# Patient Record
Sex: MALE | Race: WHITE | Employment: OTHER | URBAN - NONMETROPOLITAN AREA
[De-identification: names, ages, dates, MRNs, and addresses within clinical notes are randomized per-mention and may not be internally consistent; named-entity substitution may affect disease eponyms.]

---

## 2017-03-03 ENCOUNTER — TELEPHONE (OUTPATIENT)
Dept: FAMILY MEDICINE CLINIC | Age: 62
End: 2017-03-03

## 2017-03-03 DIAGNOSIS — Z00.00 ROUTINE GENERAL MEDICAL EXAMINATION AT A HEALTH CARE FACILITY: Primary | ICD-10-CM

## 2017-04-04 ENCOUNTER — OFFICE VISIT (OUTPATIENT)
Dept: FAMILY MEDICINE CLINIC | Age: 62
End: 2017-04-04

## 2017-04-04 VITALS
RESPIRATION RATE: 14 BRPM | DIASTOLIC BLOOD PRESSURE: 70 MMHG | BODY MASS INDEX: 36.67 KG/M2 | WEIGHT: 228.2 LBS | HEIGHT: 66 IN | HEART RATE: 72 BPM | SYSTOLIC BLOOD PRESSURE: 128 MMHG

## 2017-04-04 DIAGNOSIS — M15.9 PRIMARY OSTEOARTHRITIS INVOLVING MULTIPLE JOINTS: ICD-10-CM

## 2017-04-04 DIAGNOSIS — G44.019 EPISODIC CLUSTER HEADACHE, NOT INTRACTABLE: ICD-10-CM

## 2017-04-04 DIAGNOSIS — K21.9 GASTROESOPHAGEAL REFLUX DISEASE WITHOUT ESOPHAGITIS: ICD-10-CM

## 2017-04-04 DIAGNOSIS — E78.00 PURE HYPERCHOLESTEROLEMIA: ICD-10-CM

## 2017-04-04 DIAGNOSIS — I10 ESSENTIAL HYPERTENSION: Primary | ICD-10-CM

## 2017-04-04 PROCEDURE — 99396 PREV VISIT EST AGE 40-64: CPT | Performed by: FAMILY MEDICINE

## 2017-04-04 RX ORDER — FEXOFENADINE HCL 180 MG/1
180 TABLET ORAL DAILY
Qty: 90 TABLET | Refills: 3 | Status: SHIPPED | OUTPATIENT
Start: 2017-04-04 | End: 2018-04-06 | Stop reason: SDUPTHER

## 2017-04-04 RX ORDER — LANSOPRAZOLE 30 MG/1
30 CAPSULE, DELAYED RELEASE ORAL DAILY
Qty: 90 CAPSULE | Refills: 3 | Status: SHIPPED | OUTPATIENT
Start: 2017-04-04 | End: 2018-03-16 | Stop reason: SDUPTHER

## 2017-04-04 RX ORDER — FLUTICASONE PROPIONATE 50 MCG
1 SPRAY, SUSPENSION (ML) NASAL DAILY
Qty: 3 BOTTLE | Refills: 3 | Status: SHIPPED | OUTPATIENT
Start: 2017-04-04 | End: 2018-04-06 | Stop reason: SDUPTHER

## 2017-04-04 RX ORDER — ATORVASTATIN CALCIUM 40 MG/1
40 TABLET, FILM COATED ORAL DAILY
Qty: 90 TABLET | Refills: 3 | Status: SHIPPED | OUTPATIENT
Start: 2017-04-04 | End: 2018-04-06 | Stop reason: SDUPTHER

## 2017-08-09 DIAGNOSIS — I10 ESSENTIAL HYPERTENSION: ICD-10-CM

## 2017-08-09 RX ORDER — LOSARTAN POTASSIUM 50 MG/1
TABLET ORAL
Qty: 90 TABLET | Refills: 3 | Status: SHIPPED | OUTPATIENT
Start: 2017-08-09 | End: 2018-08-07 | Stop reason: SDUPTHER

## 2017-10-06 ENCOUNTER — NURSE ONLY (OUTPATIENT)
Dept: FAMILY MEDICINE CLINIC | Age: 62
End: 2017-10-06
Payer: COMMERCIAL

## 2017-10-06 VITALS — DIASTOLIC BLOOD PRESSURE: 86 MMHG | SYSTOLIC BLOOD PRESSURE: 120 MMHG | HEART RATE: 68 BPM | RESPIRATION RATE: 16 BRPM

## 2017-10-06 DIAGNOSIS — Z23 NEED FOR PNEUMOCOCCAL VACCINATION: Primary | ICD-10-CM

## 2017-10-06 PROCEDURE — 90471 IMMUNIZATION ADMIN: CPT | Performed by: FAMILY MEDICINE

## 2017-10-06 PROCEDURE — 90670 PCV13 VACCINE IM: CPT | Performed by: FAMILY MEDICINE

## 2017-10-06 NOTE — PROGRESS NOTES
Chief Complaint   Patient presents with    Blood Pressure Check    Injections       Vitals:    10/06/17 0849   BP: 120/86   Pulse: 68   Resp:        No changes to med  Call patient

## 2017-10-06 NOTE — PROGRESS NOTES
After obtaining consent, and per orders of Dr. Mark Marmolejo, injection of [id number] 0.5mL IM given in Left deltoid by Tania Farr. Patient instructed to report any adverse reaction to me immediately. VIS given. Consent signed. ABN signed. Patient here for nurse visit BP check;   BP: 136/90 P: 72  After 5 minutes of sitting;  BP: 120/86   P: 68  Patient denies any signs or symptoms of high blood pressure. Patient is asymptomatic.

## 2017-10-20 ENCOUNTER — NURSE ONLY (OUTPATIENT)
Dept: FAMILY MEDICINE CLINIC | Age: 62
End: 2017-10-20
Payer: COMMERCIAL

## 2017-10-20 DIAGNOSIS — Z23 NEED FOR INFLUENZA VACCINATION: Primary | ICD-10-CM

## 2017-10-20 PROCEDURE — 90471 IMMUNIZATION ADMIN: CPT | Performed by: FAMILY MEDICINE

## 2017-10-20 PROCEDURE — 90686 IIV4 VACC NO PRSV 0.5 ML IM: CPT | Performed by: FAMILY MEDICINE

## 2017-10-20 NOTE — PROGRESS NOTES
After obtaining consent, and per orders of Dr. Tomás Sutton, injection of Fluzone 0.5mL IM given in Left deltoid by Joanna Harris. Patient instructed to report any adverse reaction to me immediately. VIS given. Consent signed. Patient tolerated well.

## 2017-12-18 DIAGNOSIS — I10 ESSENTIAL HYPERTENSION: ICD-10-CM

## 2017-12-19 RX ORDER — HYDROCHLOROTHIAZIDE 25 MG/1
25 TABLET ORAL DAILY
Qty: 90 TABLET | Refills: 3 | Status: SHIPPED | OUTPATIENT
Start: 2017-12-19 | End: 2019-01-04 | Stop reason: SDUPTHER

## 2018-03-16 DIAGNOSIS — K21.9 GASTROESOPHAGEAL REFLUX DISEASE WITHOUT ESOPHAGITIS: ICD-10-CM

## 2018-03-16 RX ORDER — LANSOPRAZOLE 30 MG/1
30 CAPSULE, DELAYED RELEASE ORAL DAILY
Qty: 90 CAPSULE | Refills: 3 | Status: SHIPPED | OUTPATIENT
Start: 2018-03-16 | End: 2019-11-04

## 2018-03-16 NOTE — TELEPHONE ENCOUNTER
Lisa Chung called requesting a refill on the following medications:  Requested Prescriptions     Pending Prescriptions Disp Refills    lansoprazole (PREVACID) 30 MG delayed release capsule 90 capsule 3     Sig: Take 1 capsule by mouth daily   pt is running out early due to Dr Nelia Huerta told him to take 1 tab bid instead of Qd when has a flare up of GERD    Pharmacy verified:  [email or web address]        Date of last visit: 04-07-17   Date of next visit (if applicable): 1/3/7836

## 2018-03-23 ENCOUNTER — TELEPHONE (OUTPATIENT)
Dept: FAMILY MEDICINE CLINIC | Age: 63
End: 2018-03-23

## 2018-03-23 DIAGNOSIS — Z00.00 ROUTINE GENERAL MEDICAL EXAMINATION AT A HEALTH CARE FACILITY: Primary | ICD-10-CM

## 2018-03-29 ENCOUNTER — HOSPITAL ENCOUNTER (OUTPATIENT)
Age: 63
Discharge: HOME | End: 2018-03-29
Payer: COMMERCIAL

## 2018-03-29 DIAGNOSIS — Z00.00 ROUTINE GENERAL MEDICAL EXAMINATION AT A HEALTH CARE FACILITY: ICD-10-CM

## 2018-03-29 LAB
ALBUMIN SERPL-MCNC: 4.2 G/DL (ref 3.5–5.1)
ALP BLD-CCNC: 68 U/L (ref 38–126)
ALT SERPL-CCNC: 25 U/L (ref 11–66)
ANION GAP SERPL CALCULATED.3IONS-SCNC: 13 MEQ/L (ref 8–16)
AST SERPL-CCNC: 22 U/L (ref 5–40)
BILIRUB SERPL-MCNC: 0.6 MG/DL (ref 0.3–1.2)
BUN BLDV-MCNC: 16 MG/DL (ref 7–22)
CALCIUM SERPL-MCNC: 9.4 MG/DL (ref 8.5–10.5)
CHLORIDE BLD-SCNC: 103 MEQ/L (ref 98–111)
CHOLESTEROL, TOTAL: 143 MG/DL (ref 100–199)
CO2: 28 MEQ/L (ref 23–33)
CREAT SERPL-MCNC: 0.8 MG/DL (ref 0.4–1.2)
GFR SERPL CREATININE-BSD FRML MDRD: > 90 ML/MIN/1.73M2
GLUCOSE BLD-MCNC: 100 MG/DL (ref 70–108)
HCT VFR BLD CALC: 40.4 % (ref 42–52)
HDLC SERPL-MCNC: 47 MG/DL
HEMOGLOBIN: 14.1 GM/DL (ref 14–18)
LDL CHOLESTEROL CALCULATED: 84 MG/DL
MCH RBC QN AUTO: 31.5 PG (ref 27–31)
MCHC RBC AUTO-ENTMCNC: 34.9 GM/DL (ref 33–37)
MCV RBC AUTO: 90.5 FL (ref 80–94)
PDW BLD-RTO: 12.3 % (ref 11.5–14.5)
PLATELET # BLD: 252 THOU/MM3 (ref 130–400)
PMV BLD AUTO: 7.3 FL (ref 7.4–10.4)
POTASSIUM SERPL-SCNC: 4.3 MEQ/L (ref 3.5–5.2)
PROSTATE SPECIFIC ANTIGEN: 0.48 NG/ML (ref 0–1)
RBC # BLD: 4.46 MILL/MM3 (ref 4.7–6.1)
SODIUM BLD-SCNC: 144 MEQ/L (ref 135–145)
TOTAL PROTEIN: 6.7 G/DL (ref 6.1–8)
TRIGL SERPL-MCNC: 62 MG/DL (ref 0–199)
TSH SERPL DL<=0.05 MIU/L-ACNC: 1.93 UIU/ML (ref 0.4–4.2)
WBC # BLD: 4.9 THOU/MM3 (ref 4.8–10.8)

## 2018-03-29 PROCEDURE — 85027 COMPLETE CBC AUTOMATED: CPT

## 2018-03-29 PROCEDURE — 80053 COMPREHEN METABOLIC PANEL: CPT

## 2018-03-29 PROCEDURE — 84153 ASSAY OF PSA TOTAL: CPT

## 2018-03-29 PROCEDURE — 36415 COLL VENOUS BLD VENIPUNCTURE: CPT

## 2018-03-29 PROCEDURE — 80061 LIPID PANEL: CPT

## 2018-03-29 PROCEDURE — 84443 ASSAY THYROID STIM HORMONE: CPT

## 2018-04-06 ENCOUNTER — OFFICE VISIT (OUTPATIENT)
Dept: FAMILY MEDICINE CLINIC | Age: 63
End: 2018-04-06
Payer: COMMERCIAL

## 2018-04-06 VITALS
OXYGEN SATURATION: 97 % | HEIGHT: 66 IN | TEMPERATURE: 98.4 F | SYSTOLIC BLOOD PRESSURE: 122 MMHG | RESPIRATION RATE: 12 BRPM | HEART RATE: 84 BPM | DIASTOLIC BLOOD PRESSURE: 84 MMHG | WEIGHT: 224.2 LBS | BODY MASS INDEX: 36.03 KG/M2

## 2018-04-06 DIAGNOSIS — E78.00 PURE HYPERCHOLESTEROLEMIA: ICD-10-CM

## 2018-04-06 DIAGNOSIS — G44.019 EPISODIC CLUSTER HEADACHE, NOT INTRACTABLE: ICD-10-CM

## 2018-04-06 DIAGNOSIS — M15.9 PRIMARY OSTEOARTHRITIS INVOLVING MULTIPLE JOINTS: ICD-10-CM

## 2018-04-06 DIAGNOSIS — I10 ESSENTIAL HYPERTENSION: ICD-10-CM

## 2018-04-06 DIAGNOSIS — K21.9 GASTROESOPHAGEAL REFLUX DISEASE WITHOUT ESOPHAGITIS: ICD-10-CM

## 2018-04-06 DIAGNOSIS — Z00.00 ROUTINE GENERAL MEDICAL EXAMINATION AT A HEALTH CARE FACILITY: Primary | ICD-10-CM

## 2018-04-06 DIAGNOSIS — J30.1 CHRONIC SEASONAL ALLERGIC RHINITIS DUE TO POLLEN: ICD-10-CM

## 2018-04-06 PROCEDURE — 99396 PREV VISIT EST AGE 40-64: CPT | Performed by: FAMILY MEDICINE

## 2018-04-06 RX ORDER — FLUTICASONE PROPIONATE 50 MCG
1 SPRAY, SUSPENSION (ML) NASAL DAILY
Qty: 3 BOTTLE | Refills: 3 | Status: SHIPPED | OUTPATIENT
Start: 2018-04-06 | End: 2019-04-12 | Stop reason: SDUPTHER

## 2018-04-06 RX ORDER — CELECOXIB 200 MG/1
200 CAPSULE ORAL DAILY
Qty: 90 CAPSULE | Refills: 3 | Status: SHIPPED | OUTPATIENT
Start: 2018-04-06 | End: 2020-05-26 | Stop reason: ALTCHOICE

## 2018-04-06 RX ORDER — FEXOFENADINE HCL 180 MG/1
180 TABLET ORAL DAILY
Qty: 90 TABLET | Refills: 3 | Status: SHIPPED | OUTPATIENT
Start: 2018-04-06 | End: 2019-04-12 | Stop reason: SDUPTHER

## 2018-04-06 RX ORDER — ATORVASTATIN CALCIUM 40 MG/1
40 TABLET, FILM COATED ORAL DAILY
Qty: 90 TABLET | Refills: 3 | Status: SHIPPED | OUTPATIENT
Start: 2018-04-06 | End: 2019-04-12 | Stop reason: SDUPTHER

## 2018-04-06 ASSESSMENT — PATIENT HEALTH QUESTIONNAIRE - PHQ9
1. LITTLE INTEREST OR PLEASURE IN DOING THINGS: 0
SUM OF ALL RESPONSES TO PHQ QUESTIONS 1-9: 0
SUM OF ALL RESPONSES TO PHQ9 QUESTIONS 1 & 2: 0
2. FEELING DOWN, DEPRESSED OR HOPELESS: 0

## 2018-08-07 DIAGNOSIS — I10 ESSENTIAL HYPERTENSION: ICD-10-CM

## 2018-08-08 RX ORDER — LOSARTAN POTASSIUM 50 MG/1
TABLET ORAL
Qty: 90 TABLET | Refills: 3 | Status: SHIPPED | OUTPATIENT
Start: 2018-08-08 | End: 2019-04-12 | Stop reason: SDUPTHER

## 2018-10-05 ENCOUNTER — NURSE ONLY (OUTPATIENT)
Dept: FAMILY MEDICINE CLINIC | Age: 63
End: 2018-10-05
Payer: COMMERCIAL

## 2018-10-05 VITALS — SYSTOLIC BLOOD PRESSURE: 118 MMHG | HEART RATE: 80 BPM | DIASTOLIC BLOOD PRESSURE: 84 MMHG

## 2018-10-05 DIAGNOSIS — Z23 NEED FOR INFLUENZA VACCINATION: Primary | ICD-10-CM

## 2018-10-05 PROCEDURE — 90471 IMMUNIZATION ADMIN: CPT | Performed by: FAMILY MEDICINE

## 2018-10-05 PROCEDURE — 90686 IIV4 VACC NO PRSV 0.5 ML IM: CPT | Performed by: FAMILY MEDICINE

## 2018-10-05 NOTE — PROGRESS NOTES
Chief Complaint   Patient presents with    Blood Pressure Check    Injections       Vitals:    10/05/18 0831   BP: 118/84   Pulse: 80     No changes to med  Call patient

## 2019-01-04 DIAGNOSIS — I10 ESSENTIAL HYPERTENSION: ICD-10-CM

## 2019-01-04 RX ORDER — HYDROCHLOROTHIAZIDE 25 MG/1
25 TABLET ORAL DAILY
Qty: 90 TABLET | Refills: 3 | Status: SHIPPED | OUTPATIENT
Start: 2019-01-04 | End: 2019-11-04 | Stop reason: SDUPTHER

## 2019-02-12 ENCOUNTER — OFFICE VISIT (OUTPATIENT)
Dept: FAMILY MEDICINE CLINIC | Age: 64
End: 2019-02-12
Payer: COMMERCIAL

## 2019-02-12 VITALS
BODY MASS INDEX: 36.48 KG/M2 | TEMPERATURE: 98.3 F | RESPIRATION RATE: 16 BRPM | HEART RATE: 77 BPM | SYSTOLIC BLOOD PRESSURE: 124 MMHG | DIASTOLIC BLOOD PRESSURE: 80 MMHG | WEIGHT: 227 LBS

## 2019-02-12 DIAGNOSIS — E78.00 PURE HYPERCHOLESTEROLEMIA: ICD-10-CM

## 2019-02-12 DIAGNOSIS — Z12.5 SCREENING FOR PROSTATE CANCER: ICD-10-CM

## 2019-02-12 DIAGNOSIS — J20.9 ACUTE BRONCHITIS, UNSPECIFIED ORGANISM: Primary | ICD-10-CM

## 2019-02-12 DIAGNOSIS — I10 ESSENTIAL HYPERTENSION: ICD-10-CM

## 2019-02-12 PROCEDURE — 99213 OFFICE O/P EST LOW 20 MIN: CPT | Performed by: NURSE PRACTITIONER

## 2019-02-12 RX ORDER — DEXTROMETHORPHAN HYDROBROMIDE AND PROMETHAZINE HYDROCHLORIDE 15; 6.25 MG/5ML; MG/5ML
5 SYRUP ORAL 4 TIMES DAILY PRN
Qty: 240 ML | Refills: 0 | Status: SHIPPED | OUTPATIENT
Start: 2019-02-12 | End: 2019-02-19

## 2019-02-12 RX ORDER — RANITIDINE 300 MG/1
150 CAPSULE ORAL 2 TIMES DAILY
COMMUNITY
End: 2020-05-26 | Stop reason: ALTCHOICE

## 2019-02-12 RX ORDER — AZITHROMYCIN 250 MG/1
TABLET, FILM COATED ORAL
Qty: 1 PACKET | Refills: 0 | Status: SHIPPED | OUTPATIENT
Start: 2019-02-12 | End: 2019-04-12 | Stop reason: ALTCHOICE

## 2019-02-12 ASSESSMENT — ENCOUNTER SYMPTOMS
GASTROINTESTINAL NEGATIVE: 1
EYES NEGATIVE: 1
COUGH: 1

## 2019-02-16 ENCOUNTER — HOSPITAL ENCOUNTER (OUTPATIENT)
Age: 64
Discharge: HOME | End: 2019-02-16
Payer: COMMERCIAL

## 2019-02-16 DIAGNOSIS — Z12.5 SCREENING FOR PROSTATE CANCER: ICD-10-CM

## 2019-02-16 DIAGNOSIS — E78.00 PURE HYPERCHOLESTEROLEMIA: ICD-10-CM

## 2019-02-16 DIAGNOSIS — I10 ESSENTIAL HYPERTENSION: ICD-10-CM

## 2019-02-16 LAB
ALBUMIN SERPL-MCNC: 4.7 G/DL (ref 3.5–5.1)
ALP BLD-CCNC: 72 U/L (ref 38–126)
ALT SERPL-CCNC: 28 U/L (ref 11–66)
ANION GAP SERPL CALCULATED.3IONS-SCNC: 12 MEQ/L (ref 8–16)
AST SERPL-CCNC: 23 U/L (ref 5–40)
BASOPHILS # BLD: 0.8 %
BASOPHILS ABSOLUTE: 0 THOU/MM3 (ref 0–0.1)
BILIRUB SERPL-MCNC: 0.5 MG/DL (ref 0.3–1.2)
BUN BLDV-MCNC: 18 MG/DL (ref 7–22)
CALCIUM SERPL-MCNC: 9.7 MG/DL (ref 8.5–10.5)
CHLORIDE BLD-SCNC: 102 MEQ/L (ref 98–111)
CHOLESTEROL, TOTAL: 159 MG/DL (ref 100–199)
CO2: 29 MEQ/L (ref 23–33)
CREAT SERPL-MCNC: 0.9 MG/DL (ref 0.4–1.2)
EOSINOPHIL # BLD: 6.7 %
EOSINOPHILS ABSOLUTE: 0.3 THOU/MM3 (ref 0–0.4)
ERYTHROCYTE [DISTWIDTH] IN BLOOD BY AUTOMATED COUNT: 11.8 % (ref 11.5–14.5)
ERYTHROCYTE [DISTWIDTH] IN BLOOD BY AUTOMATED COUNT: 39.9 FL (ref 35–45)
GFR SERPL CREATININE-BSD FRML MDRD: 85 ML/MIN/1.73M2
GLUCOSE BLD-MCNC: 104 MG/DL (ref 70–108)
HCT VFR BLD CALC: 43.9 % (ref 42–52)
HDLC SERPL-MCNC: 46 MG/DL
HEMOGLOBIN: 14.7 GM/DL (ref 14–18)
IMMATURE GRANS (ABS): 0.01 THOU/MM3 (ref 0–0.07)
IMMATURE GRANULOCYTES: 0.2 %
LDL CHOLESTEROL CALCULATED: 100 MG/DL
LYMPHOCYTES # BLD: 27.2 %
LYMPHOCYTES ABSOLUTE: 1.3 THOU/MM3 (ref 1–4.8)
MCH RBC QN AUTO: 31.3 PG (ref 26–33)
MCHC RBC AUTO-ENTMCNC: 33.5 GM/DL (ref 32.2–35.5)
MCV RBC AUTO: 93.6 FL (ref 80–94)
MONOCYTES # BLD: 11.2 %
MONOCYTES ABSOLUTE: 0.5 THOU/MM3 (ref 0.4–1.3)
NUCLEATED RED BLOOD CELLS: 0 /100 WBC
PLATELET # BLD: 266 THOU/MM3 (ref 130–400)
PMV BLD AUTO: 8.8 FL (ref 9.4–12.4)
POTASSIUM SERPL-SCNC: 4.4 MEQ/L (ref 3.5–5.2)
PROSTATE SPECIFIC ANTIGEN: 0.48 NG/ML (ref 0–1)
RBC # BLD: 4.69 MILL/MM3 (ref 4.7–6.1)
SEG NEUTROPHILS: 53.9 %
SEGMENTED NEUTROPHILS ABSOLUTE COUNT: 2.6 THOU/MM3 (ref 1.8–7.7)
SODIUM BLD-SCNC: 143 MEQ/L (ref 135–145)
TOTAL PROTEIN: 7.3 G/DL (ref 6.1–8)
TRIGL SERPL-MCNC: 65 MG/DL (ref 0–199)
WBC # BLD: 4.8 THOU/MM3 (ref 4.8–10.8)

## 2019-02-16 PROCEDURE — 80061 LIPID PANEL: CPT

## 2019-02-16 PROCEDURE — G0103 PSA SCREENING: HCPCS

## 2019-02-16 PROCEDURE — 84238 ASSAY NONENDOCRINE RECEPTOR: CPT

## 2019-02-16 PROCEDURE — 80053 COMPREHEN METABOLIC PANEL: CPT

## 2019-02-16 PROCEDURE — 36415 COLL VENOUS BLD VENIPUNCTURE: CPT

## 2019-02-16 PROCEDURE — 85025 COMPLETE CBC W/AUTO DIFF WBC: CPT

## 2019-02-19 LAB — SOLUBLE TRANSFERRIN RECEPT: 2.6 MG/L (ref 2.2–5)

## 2019-04-12 ENCOUNTER — OFFICE VISIT (OUTPATIENT)
Dept: FAMILY MEDICINE CLINIC | Age: 64
End: 2019-04-12
Payer: COMMERCIAL

## 2019-04-12 VITALS
WEIGHT: 223 LBS | RESPIRATION RATE: 10 BRPM | HEIGHT: 66 IN | BODY MASS INDEX: 35.84 KG/M2 | SYSTOLIC BLOOD PRESSURE: 128 MMHG | TEMPERATURE: 98.4 F | DIASTOLIC BLOOD PRESSURE: 84 MMHG | HEART RATE: 88 BPM

## 2019-04-12 DIAGNOSIS — G44.019 EPISODIC CLUSTER HEADACHE, NOT INTRACTABLE: ICD-10-CM

## 2019-04-12 DIAGNOSIS — Z00.00 ROUTINE GENERAL MEDICAL EXAMINATION AT A HEALTH CARE FACILITY: Primary | ICD-10-CM

## 2019-04-12 DIAGNOSIS — J30.1 CHRONIC SEASONAL ALLERGIC RHINITIS DUE TO POLLEN: ICD-10-CM

## 2019-04-12 DIAGNOSIS — R07.89 CHEST WALL PAIN: ICD-10-CM

## 2019-04-12 DIAGNOSIS — E78.00 PURE HYPERCHOLESTEROLEMIA: ICD-10-CM

## 2019-04-12 DIAGNOSIS — I10 ESSENTIAL HYPERTENSION: ICD-10-CM

## 2019-04-12 PROCEDURE — 99396 PREV VISIT EST AGE 40-64: CPT | Performed by: FAMILY MEDICINE

## 2019-04-12 RX ORDER — ATORVASTATIN CALCIUM 40 MG/1
40 TABLET, FILM COATED ORAL DAILY
Qty: 90 TABLET | Refills: 3 | Status: SHIPPED | OUTPATIENT
Start: 2019-04-12 | End: 2019-11-04 | Stop reason: SDUPTHER

## 2019-04-12 RX ORDER — FLUTICASONE PROPIONATE 50 MCG
1 SPRAY, SUSPENSION (ML) NASAL DAILY
Qty: 3 BOTTLE | Refills: 3 | Status: SHIPPED | OUTPATIENT
Start: 2019-04-12 | End: 2019-11-04 | Stop reason: SDUPTHER

## 2019-04-12 RX ORDER — FEXOFENADINE HCL 180 MG/1
180 TABLET ORAL DAILY
Qty: 90 TABLET | Refills: 3 | Status: SHIPPED | OUTPATIENT
Start: 2019-04-12 | End: 2019-11-04 | Stop reason: SDUPTHER

## 2019-04-12 RX ORDER — LOSARTAN POTASSIUM 50 MG/1
TABLET ORAL
Qty: 90 TABLET | Refills: 3 | Status: SHIPPED | OUTPATIENT
Start: 2019-04-12 | End: 2019-11-04 | Stop reason: SDUPTHER

## 2019-04-12 ASSESSMENT — PATIENT HEALTH QUESTIONNAIRE - PHQ9
SUM OF ALL RESPONSES TO PHQ QUESTIONS 1-9: 0
2. FEELING DOWN, DEPRESSED OR HOPELESS: 0
1. LITTLE INTEREST OR PLEASURE IN DOING THINGS: 0
SUM OF ALL RESPONSES TO PHQ9 QUESTIONS 1 & 2: 0
SUM OF ALL RESPONSES TO PHQ QUESTIONS 1-9: 0

## 2019-04-12 NOTE — PROGRESS NOTES
[address] [facility]  [address]  Dept: [phone]  Dept Fax: [phone]  Loc: [phone]    Tessy Nicole is a 59 y.o. male who presents today for:  Chief Complaint   Patient presents with    Annual Exam           HPI:     HPI    Well Adult Physical: Patient here for a comprehensive physical exam.The patient reports all chronic issues are well controlled on current medications. Do you take any herbs or supplements that were not prescribed by a doctor? no Are you taking calcium supplements? no Are you taking aspirin daily? no   History:  Any STD's in the past? none    Chest wall pain increased the past few weeks. He is climbing more at work the past few weeks. Tylenol helps 2 tabs 1-2 times per day. Left sinus pressure is controlled with allegra and flonase daily. Reviewed chart forpast medical history , surgical history , allergies, social history , family history and medications. Health Maintenance   Topic Date Due    Shingles Vaccine (1 of 2) 09/19/2006    Potassium monitoring  02/16/2020    Creatinine monitoring  02/16/2020    Diabetes screen  03/26/2020    Lipid screen  02/16/2024    DTaP/Tdap/Td vaccine (2 - Td) 11/22/2026    Colon cancer screen colonoscopy  10/30/2028    Flu vaccine  Completed    Pneumococcal 0-64 years Vaccine  Aged Out    Hepatitis C screen  Discontinued    HIV screen  Discontinued       Subjective:      Constitutional:Negative for fever, chills, diaphoresis, activity change, appetite change and fatigue. HENT: Negative for hearing loss, ear pain, congestion, sore throat, rhinorrhea, postnasal drip and ear discharge. Eyes: Negative for photophobia and visual disturbance. Respiratory: Negative for cough, chest tightness, shortness of breath and wheezing. Cardiovascular: Negative for chest pain and leg swelling.    Gastrointestinal: Negative for nausea, vomiting, abdominal pain, diarrhea and constipation. Genitourinary: Negative for dysuria, urgency and frequency. Neurological: Negative for weakness, light-headedness and headaches. Psychiatric/Behavioral: Negative for sleep disturbance. Objective:     Vitals:    04/12/19 0804   BP: 128/84   Site: Left Upper Arm   Position: Sitting   Cuff Size: Large Adult   Pulse: 88   Resp: 10   Temp: 98.4 °F (36.9 °C)   TempSrc: Temporal   Weight: 223 lb (101.2 kg)   Height: 5' 6\" (1.676 m)     Wt Readings from Last 3 Encounters:   04/12/19 223 lb (101.2 kg)   02/12/19 227 lb (103 kg)   04/06/18 224 lb 3.2 oz (101.7 kg)       Physical Exam  Constitutional: Vital signs are normal. He appears well-developed and well-nourished. He is active. HENT:   Head: Normocephalic and atraumatic. Right Ear: Tympanic membrane, external ear and ear canal normal. No drainage or tenderness. Left Ear: Tympanic membrane, external ear and ear canal normal. No drainage or tenderness. Nose: Nose normal. No mucosal edema or rhinorrhea. Mouth/Throat: Uvula is midline, oropharynx is clear and moist and mucous membranes are normal. Mucous membranes are not pale. Normal dentition. No posterior oropharyngeal edema or posterior oropharyngeal erythema. Eyes: Lids are normal. Right eye exhibits no chemosis and no discharge. Left eye exhibits no chemosis and no drainage. Right conjunctiva has no hemorrhage. Left conjunctiva has no hemorrhage. Right eye exhibits normal extraocular motion. Left eye exhibits normal extraocular motion. Right pupil is round and reactive. Left pupil is round and reactive. Pupils are equal.   Cardiovascular: Normal rate, regular rhythm, S1 normal, S2 normal and normal heart sounds. Exam reveals no gallop. No murmur heard. Pulmonary/Chest: Effort normal and breath sounds normal. No respiratory distress. He has no wheezes. He has no rhonchi. He has no rales. Abdominal: Soft.  Normal appearance and bowel sounds are normal. He exhibits no distension and no mass. There is no hepatosplenomegaly. No tenderness. He has no rigidity, no rebound and no guarding. No hernia. Musculoskeletal:        Right lower leg: He exhibits no edema. Left lower leg: He exhibits no edema. Neurological: He is alert. Oriented and pleasent        Assessment/Plan   Annelise Stone was seen today for annual exam.    Diagnoses and all orders for this visit:    Routine general medical examination at a health care facility    Essential hypertension  -     losartan (COZAAR) 50 MG tablet; Take 1 tablet by mouth daily    Pure hypercholesterolemia  -     atorvastatin (LIPITOR) 40 MG tablet; Take 1 tablet by mouth daily    Episodic cluster headache, not intractable  -     fluticasone (FLONASE) 50 MCG/ACT nasal spray; 1 spray by Nasal route daily    Chronic seasonal allergic rhinitis due to pollen  -     fexofenadine (ALLEGRA) 180 MG tablet; Take 1 tablet by mouth daily    Chest wall pain    No change to medications   Continue healthy diet and exercise  Aspirin daily    Take celebrex daily for the next 2 weeks  Call or return to clinic prn if these symptoms worsen or fail to improve as anticipated. Discussed use, benefit, and side effectsof prescribed medications. All patient questions answered. Pt voiced understanding. Reviewed health maintenance. Instructed to continue current medications, diet and exercise. Patient agreed with treatment plan. Followup as directed.      Electronically signed by Brittany Camacho MD

## 2019-11-04 ENCOUNTER — OFFICE VISIT (OUTPATIENT)
Dept: FAMILY MEDICINE CLINIC | Age: 64
End: 2019-11-04
Payer: COMMERCIAL

## 2019-11-04 VITALS
HEART RATE: 84 BPM | TEMPERATURE: 98 F | OXYGEN SATURATION: 97 % | WEIGHT: 228 LBS | RESPIRATION RATE: 10 BRPM | BODY MASS INDEX: 36.8 KG/M2 | SYSTOLIC BLOOD PRESSURE: 110 MMHG | DIASTOLIC BLOOD PRESSURE: 80 MMHG

## 2019-11-04 DIAGNOSIS — J40 BRONCHITIS: Primary | ICD-10-CM

## 2019-11-04 DIAGNOSIS — E78.00 PURE HYPERCHOLESTEROLEMIA: ICD-10-CM

## 2019-11-04 DIAGNOSIS — J30.1 CHRONIC SEASONAL ALLERGIC RHINITIS DUE TO POLLEN: ICD-10-CM

## 2019-11-04 DIAGNOSIS — I10 ESSENTIAL HYPERTENSION: ICD-10-CM

## 2019-11-04 DIAGNOSIS — G44.019 EPISODIC CLUSTER HEADACHE, NOT INTRACTABLE: ICD-10-CM

## 2019-11-04 PROCEDURE — 99214 OFFICE O/P EST MOD 30 MIN: CPT | Performed by: FAMILY MEDICINE

## 2019-11-04 RX ORDER — FEXOFENADINE HCL 180 MG/1
180 TABLET ORAL DAILY
Qty: 90 TABLET | Refills: 3 | Status: SHIPPED | OUTPATIENT
Start: 2019-11-04 | End: 2020-11-17

## 2019-11-04 RX ORDER — ATORVASTATIN CALCIUM 40 MG/1
40 TABLET, FILM COATED ORAL DAILY
Qty: 90 TABLET | Refills: 3 | Status: SHIPPED | OUTPATIENT
Start: 2019-11-04 | End: 2020-11-17 | Stop reason: SDUPTHER

## 2019-11-04 RX ORDER — HYDROCHLOROTHIAZIDE 25 MG/1
25 TABLET ORAL DAILY
Qty: 90 TABLET | Refills: 3 | Status: SHIPPED | OUTPATIENT
Start: 2019-11-04 | End: 2020-11-17 | Stop reason: SDUPTHER

## 2019-11-04 RX ORDER — LEVOFLOXACIN 500 MG/1
500 TABLET, FILM COATED ORAL DAILY
Qty: 10 TABLET | Refills: 0 | Status: SHIPPED | OUTPATIENT
Start: 2019-11-04 | End: 2019-11-14

## 2019-11-04 RX ORDER — METHYLPREDNISOLONE 4 MG/1
TABLET ORAL
Qty: 1 KIT | Refills: 0 | Status: SHIPPED | OUTPATIENT
Start: 2019-11-04 | End: 2019-11-10

## 2019-11-04 RX ORDER — GUAIFENESIN 600 MG/1
1200 TABLET, EXTENDED RELEASE ORAL 2 TIMES DAILY
COMMUNITY
End: 2020-02-04

## 2019-11-04 RX ORDER — LOSARTAN POTASSIUM 50 MG/1
TABLET ORAL
Qty: 90 TABLET | Refills: 3 | Status: SHIPPED | OUTPATIENT
Start: 2019-11-04 | End: 2020-11-17 | Stop reason: SDUPTHER

## 2019-11-04 RX ORDER — FLUTICASONE PROPIONATE 50 MCG
1 SPRAY, SUSPENSION (ML) NASAL DAILY
Qty: 3 BOTTLE | Refills: 3 | Status: SHIPPED | OUTPATIENT
Start: 2019-11-04 | End: 2020-11-17 | Stop reason: SDUPTHER

## 2019-12-04 ENCOUNTER — TELEPHONE (OUTPATIENT)
Dept: FAMILY MEDICINE CLINIC | Age: 64
End: 2019-12-04

## 2019-12-04 DIAGNOSIS — J40 BRONCHITIS: Primary | ICD-10-CM

## 2019-12-06 ENCOUNTER — HOSPITAL ENCOUNTER (OUTPATIENT)
Age: 64
Discharge: HOME | End: 2019-12-06
Payer: COMMERCIAL

## 2019-12-06 ENCOUNTER — HOSPITAL ENCOUNTER (OUTPATIENT)
Dept: GENERAL RADIOLOGY | Age: 64
Discharge: HOME | End: 2019-12-06
Payer: COMMERCIAL

## 2019-12-06 DIAGNOSIS — J40 BRONCHITIS: ICD-10-CM

## 2019-12-06 PROCEDURE — 71046 X-RAY EXAM CHEST 2 VIEWS: CPT

## 2019-12-06 RX ORDER — AZITHROMYCIN 250 MG/1
TABLET, FILM COATED ORAL
Qty: 1 PACKET | Refills: 0 | Status: SHIPPED | OUTPATIENT
Start: 2019-12-06 | End: 2020-02-04

## 2019-12-09 ENCOUNTER — OFFICE VISIT (OUTPATIENT)
Dept: FAMILY MEDICINE CLINIC | Age: 64
End: 2019-12-09
Payer: COMMERCIAL

## 2019-12-09 VITALS
BODY MASS INDEX: 36.96 KG/M2 | SYSTOLIC BLOOD PRESSURE: 122 MMHG | TEMPERATURE: 99.3 F | RESPIRATION RATE: 16 BRPM | DIASTOLIC BLOOD PRESSURE: 84 MMHG | HEIGHT: 66 IN | OXYGEN SATURATION: 97 % | WEIGHT: 230 LBS | HEART RATE: 88 BPM

## 2019-12-09 DIAGNOSIS — R06.09 DOE (DYSPNEA ON EXERTION): ICD-10-CM

## 2019-12-09 DIAGNOSIS — R07.9 CHEST PAIN, UNSPECIFIED TYPE: ICD-10-CM

## 2019-12-09 DIAGNOSIS — J02.9 SORE THROAT: Primary | ICD-10-CM

## 2019-12-09 DIAGNOSIS — J40 BRONCHITIS: ICD-10-CM

## 2019-12-09 DIAGNOSIS — I10 ESSENTIAL HYPERTENSION: ICD-10-CM

## 2019-12-09 LAB — STREPTOCOCCUS A RNA: NEGATIVE

## 2019-12-09 PROCEDURE — 99214 OFFICE O/P EST MOD 30 MIN: CPT | Performed by: NURSE PRACTITIONER

## 2019-12-09 PROCEDURE — 4004F PT TOBACCO SCREEN RCVD TLK: CPT | Performed by: NURSE PRACTITIONER

## 2019-12-09 PROCEDURE — 87651 STREP A DNA AMP PROBE: CPT | Performed by: NURSE PRACTITIONER

## 2019-12-09 PROCEDURE — 3017F COLORECTAL CA SCREEN DOC REV: CPT | Performed by: NURSE PRACTITIONER

## 2019-12-09 PROCEDURE — G8484 FLU IMMUNIZE NO ADMIN: HCPCS | Performed by: NURSE PRACTITIONER

## 2019-12-09 PROCEDURE — G8427 DOCREV CUR MEDS BY ELIG CLIN: HCPCS | Performed by: NURSE PRACTITIONER

## 2019-12-09 PROCEDURE — G8417 CALC BMI ABV UP PARAM F/U: HCPCS | Performed by: NURSE PRACTITIONER

## 2019-12-09 RX ORDER — IBUPROFEN 200 MG
200 TABLET ORAL EVERY 6 HOURS PRN
COMMUNITY
End: 2020-03-13

## 2019-12-09 RX ORDER — PROMETHAZINE HYDROCHLORIDE AND CODEINE PHOSPHATE 6.25; 1 MG/5ML; MG/5ML
5 SYRUP ORAL 4 TIMES DAILY PRN
Qty: 120 ML | Refills: 0 | Status: SHIPPED | OUTPATIENT
Start: 2019-12-09 | End: 2019-12-16

## 2019-12-09 ASSESSMENT — ENCOUNTER SYMPTOMS
NAUSEA: 1
VOMITING: 1
SHORTNESS OF BREATH: 1
COUGH: 1
EYES NEGATIVE: 1

## 2020-01-06 ENCOUNTER — HOSPITAL ENCOUNTER (OUTPATIENT)
Dept: NON INVASIVE DIAGNOSTICS | Age: 65
Discharge: HOME | End: 2020-01-06
Payer: COMMERCIAL

## 2020-01-06 VITALS — WEIGHT: 220 LBS | BODY MASS INDEX: 35.36 KG/M2 | HEIGHT: 66 IN

## 2020-01-06 PROCEDURE — 93017 CV STRESS TEST TRACING ONLY: CPT | Performed by: INTERNAL MEDICINE

## 2020-01-07 ENCOUNTER — TELEPHONE (OUTPATIENT)
Dept: FAMILY MEDICINE CLINIC | Age: 65
End: 2020-01-07

## 2020-01-07 NOTE — TELEPHONE ENCOUNTER
Please call pt. Due to his severe sob with the exercise stress test the test was not reliable.   I recommend to have a lexiscan stress test

## 2020-01-07 NOTE — TELEPHONE ENCOUNTER
Patient aware, voiced he would like to have this done at [facility]. Notes placed in que for scheduling and precert.  No concerns voiced

## 2020-01-14 ENCOUNTER — HOSPITAL ENCOUNTER (OUTPATIENT)
Dept: NON INVASIVE DIAGNOSTICS | Age: 65
Discharge: HOME | End: 2020-01-14
Payer: COMMERCIAL

## 2020-01-14 PROCEDURE — 6360000002 HC RX W HCPCS

## 2020-01-14 PROCEDURE — 2709999900 HC NON-CHARGEABLE SUPPLY

## 2020-01-14 PROCEDURE — 3430000000 HC RX DIAGNOSTIC RADIOPHARMACEUTICAL: Performed by: NURSE PRACTITIONER

## 2020-01-14 PROCEDURE — A9500 TC99M SESTAMIBI: HCPCS | Performed by: NURSE PRACTITIONER

## 2020-01-14 PROCEDURE — 93017 CV STRESS TEST TRACING ONLY: CPT | Performed by: INTERNAL MEDICINE

## 2020-01-14 PROCEDURE — 78452 HT MUSCLE IMAGE SPECT MULT: CPT

## 2020-01-14 RX ADMIN — Medication 10.1 MILLICURIE: at 13:56

## 2020-01-14 RX ADMIN — Medication 35 MILLICURIE: at 14:42

## 2020-02-04 ENCOUNTER — OFFICE VISIT (OUTPATIENT)
Dept: FAMILY MEDICINE CLINIC | Age: 65
End: 2020-02-04
Payer: COMMERCIAL

## 2020-02-04 VITALS
RESPIRATION RATE: 16 BRPM | HEART RATE: 80 BPM | WEIGHT: 227 LBS | BODY MASS INDEX: 36.64 KG/M2 | DIASTOLIC BLOOD PRESSURE: 72 MMHG | SYSTOLIC BLOOD PRESSURE: 116 MMHG | TEMPERATURE: 98.2 F

## 2020-02-04 PROCEDURE — 99214 OFFICE O/P EST MOD 30 MIN: CPT | Performed by: FAMILY MEDICINE

## 2020-02-04 PROCEDURE — G8427 DOCREV CUR MEDS BY ELIG CLIN: HCPCS | Performed by: FAMILY MEDICINE

## 2020-02-04 PROCEDURE — 3017F COLORECTAL CA SCREEN DOC REV: CPT | Performed by: FAMILY MEDICINE

## 2020-02-04 PROCEDURE — 4004F PT TOBACCO SCREEN RCVD TLK: CPT | Performed by: FAMILY MEDICINE

## 2020-02-04 PROCEDURE — G8417 CALC BMI ABV UP PARAM F/U: HCPCS | Performed by: FAMILY MEDICINE

## 2020-02-04 PROCEDURE — G8484 FLU IMMUNIZE NO ADMIN: HCPCS | Performed by: FAMILY MEDICINE

## 2020-02-04 RX ORDER — METHYLPREDNISOLONE 4 MG/1
TABLET ORAL
Qty: 1 KIT | Refills: 0 | Status: SHIPPED | OUTPATIENT
Start: 2020-02-04 | End: 2020-02-10

## 2020-02-04 RX ORDER — OMEPRAZOLE 20 MG/1
20 CAPSULE, DELAYED RELEASE ORAL DAILY
Qty: 30 CAPSULE | Refills: 3 | Status: SHIPPED | OUTPATIENT
Start: 2020-02-04 | End: 2020-08-27 | Stop reason: SDUPTHER

## 2020-02-04 RX ORDER — OMEPRAZOLE 20 MG/1
20 CAPSULE, DELAYED RELEASE ORAL 2 TIMES DAILY
Qty: 30 CAPSULE | Refills: 3 | Status: SHIPPED | OUTPATIENT
Start: 2020-02-04 | End: 2020-02-04 | Stop reason: SDUPTHER

## 2020-02-04 RX ORDER — METHYLPREDNISOLONE 4 MG/1
TABLET ORAL
Qty: 1 KIT | Refills: 0 | Status: SHIPPED | OUTPATIENT
Start: 2020-02-04 | End: 2020-02-04 | Stop reason: SDUPTHER

## 2020-02-04 ASSESSMENT — PATIENT HEALTH QUESTIONNAIRE - PHQ9: DEPRESSION UNABLE TO ASSESS: PT REFUSES

## 2020-02-12 ENCOUNTER — TELEPHONE (OUTPATIENT)
Dept: FAMILY MEDICINE CLINIC | Age: 65
End: 2020-02-12

## 2020-02-13 ENCOUNTER — HOSPITAL ENCOUNTER (OUTPATIENT)
Dept: PULMONOLOGY | Age: 65
Discharge: HOME | End: 2020-02-13
Payer: COMMERCIAL

## 2020-02-13 PROCEDURE — 94060 EVALUATION OF WHEEZING: CPT

## 2020-03-13 ENCOUNTER — OFFICE VISIT (OUTPATIENT)
Dept: FAMILY MEDICINE CLINIC | Age: 65
End: 2020-03-13
Payer: COMMERCIAL

## 2020-03-13 VITALS
RESPIRATION RATE: 16 BRPM | SYSTOLIC BLOOD PRESSURE: 124 MMHG | TEMPERATURE: 96.8 F | HEART RATE: 64 BPM | WEIGHT: 223.2 LBS | HEIGHT: 66 IN | BODY MASS INDEX: 35.87 KG/M2 | DIASTOLIC BLOOD PRESSURE: 86 MMHG

## 2020-03-13 PROCEDURE — 4004F PT TOBACCO SCREEN RCVD TLK: CPT | Performed by: FAMILY MEDICINE

## 2020-03-13 PROCEDURE — G8427 DOCREV CUR MEDS BY ELIG CLIN: HCPCS | Performed by: FAMILY MEDICINE

## 2020-03-13 PROCEDURE — 96372 THER/PROPH/DIAG INJ SC/IM: CPT | Performed by: FAMILY MEDICINE

## 2020-03-13 PROCEDURE — G8484 FLU IMMUNIZE NO ADMIN: HCPCS | Performed by: FAMILY MEDICINE

## 2020-03-13 PROCEDURE — 3017F COLORECTAL CA SCREEN DOC REV: CPT | Performed by: FAMILY MEDICINE

## 2020-03-13 PROCEDURE — 99213 OFFICE O/P EST LOW 20 MIN: CPT | Performed by: FAMILY MEDICINE

## 2020-03-13 PROCEDURE — G8417 CALC BMI ABV UP PARAM F/U: HCPCS | Performed by: FAMILY MEDICINE

## 2020-03-13 RX ORDER — ECHINACEA PURPUREA EXTRACT 125 MG
1 TABLET ORAL PRN
Qty: 1 BOTTLE | Refills: 3 | COMMUNITY
Start: 2020-03-13 | End: 2020-05-26

## 2020-03-13 RX ORDER — CEFDINIR 300 MG/1
300 CAPSULE ORAL 2 TIMES DAILY
Qty: 10 CAPSULE | Refills: 0 | Status: SHIPPED | OUTPATIENT
Start: 2020-03-13 | End: 2020-03-18

## 2020-03-13 RX ORDER — TRIAMCINOLONE ACETONIDE 40 MG/ML
40 INJECTION, SUSPENSION INTRA-ARTICULAR; INTRAMUSCULAR ONCE
Status: COMPLETED | OUTPATIENT
Start: 2020-03-13 | End: 2020-03-13

## 2020-03-13 RX ADMIN — TRIAMCINOLONE ACETONIDE 40 MG: 40 INJECTION, SUSPENSION INTRA-ARTICULAR; INTRAMUSCULAR at 09:20

## 2020-03-13 ASSESSMENT — PATIENT HEALTH QUESTIONNAIRE - PHQ9
SUM OF ALL RESPONSES TO PHQ QUESTIONS 1-9: 0
1. LITTLE INTEREST OR PLEASURE IN DOING THINGS: 0
2. FEELING DOWN, DEPRESSED OR HOPELESS: 0
SUM OF ALL RESPONSES TO PHQ QUESTIONS 1-9: 0
SUM OF ALL RESPONSES TO PHQ9 QUESTIONS 1 & 2: 0

## 2020-03-13 NOTE — PROGRESS NOTES
Subjective:      Patient ID: Sara Pedraza is a 62 y.o. male. HPI  Chief Complaint   Patient presents with    Congestion     x1 week     Cough         Here for cough for almost 2 weeks. It got better then worse again. Associated with headache, sinus pressure, phlegm with coughing, PND, nausea, loose stool yesterday once only, but normal appetite and no fever. Tried:  mucinex minimal help, takes flonase daily. Review of Systems  Constitutional: Negative for fever, chills, diaphoresis, activity change, appetite change and fatigue. HENT: Negative for hearing loss, ear pain, congestion, sore throat, rhinorrhea, postnasal drip and ear discharge. Eyes: Negative for photophobia and visual disturbance. Respiratory: Negative for cough, chest tightness, shortness of breath and wheezing. Cardiovascular: Negative for chest pain and leg swelling. Gastrointestinal: Negative for nausea, vomiting, abdominal pain, diarrhea and constipation. Genitourinary: Negative for dysuria, urgency and frequency. Neurological: Negative for weakness, light-headedness and headaches. Psychiatric/Behavioral: Negative for sleep disturbance. Objective:   Physical Exam  Vitals:    03/13/20 0850   BP: 124/86   Pulse: 64   Resp: 16   Temp: 96.8 °F (36 °C)     Wt Readings from Last 3 Encounters:   03/13/20 223 lb 3.2 oz (101.2 kg)   02/04/20 227 lb (103 kg)   01/06/20 220 lb (99.8 kg)     Physical Exam   Constitutional: Vital signs are normal. He appears well-developed and well-nourished. He is active. HENT:   Head: Normocephalic and atraumatic. Right Ear: Tympanic membrane, external ear and ear canal normal. No drainage or tenderness. Left Ear: Tympanic membrane, external ear and ear canal normal. No drainage or tenderness. Nose: Nose normal. moderate mucosal edema and rhinorrhea worse on the left.    Mouth/Throat: Uvula is midline, oropharynx is clear and moist and mucous membranes are normal. Mucous

## 2020-03-18 ENCOUNTER — TELEPHONE (OUTPATIENT)
Dept: FAMILY MEDICINE CLINIC | Age: 65
End: 2020-03-18

## 2020-03-18 RX ORDER — LEVOFLOXACIN 500 MG/1
500 TABLET, FILM COATED ORAL DAILY
Qty: 10 TABLET | Refills: 0 | Status: SHIPPED | OUTPATIENT
Start: 2020-03-18 | End: 2020-03-28

## 2020-05-20 ENCOUNTER — HOSPITAL ENCOUNTER (OUTPATIENT)
Age: 65
Discharge: HOME | End: 2020-05-20
Payer: COMMERCIAL

## 2020-05-20 DIAGNOSIS — Z00.00 ROUTINE GENERAL MEDICAL EXAMINATION AT A HEALTH CARE FACILITY: ICD-10-CM

## 2020-05-20 LAB
ALBUMIN SERPL-MCNC: 4.3 G/DL (ref 3.5–5.1)
ALP BLD-CCNC: 63 U/L (ref 38–126)
ALT SERPL-CCNC: 22 U/L (ref 11–66)
ANION GAP SERPL CALCULATED.3IONS-SCNC: 7 MEQ/L (ref 8–16)
AST SERPL-CCNC: 23 U/L (ref 5–40)
BILIRUB SERPL-MCNC: 1 MG/DL (ref 0.3–1.2)
BUN BLDV-MCNC: 16 MG/DL (ref 7–22)
CALCIUM SERPL-MCNC: 9.5 MG/DL (ref 8.5–10.5)
CHLORIDE BLD-SCNC: 101 MEQ/L (ref 98–111)
CHOLESTEROL, TOTAL: 152 MG/DL (ref 100–199)
CO2: 31 MEQ/L (ref 23–33)
CREAT SERPL-MCNC: 0.9 MG/DL (ref 0.4–1.2)
ERYTHROCYTE [DISTWIDTH] IN BLOOD BY AUTOMATED COUNT: 12.5 % (ref 11.5–14.5)
ERYTHROCYTE [DISTWIDTH] IN BLOOD BY AUTOMATED COUNT: 43 FL (ref 35–45)
GFR SERPL CREATININE-BSD FRML MDRD: 85 ML/MIN/1.73M2
GLUCOSE BLD-MCNC: 89 MG/DL (ref 70–108)
HCT VFR BLD CALC: 43.2 % (ref 42–52)
HDLC SERPL-MCNC: 49 MG/DL
HEMOGLOBIN: 14.4 GM/DL (ref 14–18)
LDL CHOLESTEROL CALCULATED: 88 MG/DL
MCH RBC QN AUTO: 31.5 PG (ref 26–33)
MCHC RBC AUTO-ENTMCNC: 33.3 GM/DL (ref 32.2–35.5)
MCV RBC AUTO: 94.5 FL (ref 80–94)
PLATELET # BLD: 236 THOU/MM3 (ref 130–400)
PMV BLD AUTO: 9.3 FL (ref 9.4–12.4)
POTASSIUM SERPL-SCNC: 3.8 MEQ/L (ref 3.5–5.2)
PROSTATE SPECIFIC ANTIGEN: 0.55 NG/ML (ref 0–1)
RBC # BLD: 4.57 MILL/MM3 (ref 4.7–6.1)
SODIUM BLD-SCNC: 139 MEQ/L (ref 135–145)
TOTAL PROTEIN: 6.8 G/DL (ref 6.1–8)
TRIGL SERPL-MCNC: 75 MG/DL (ref 0–199)
TSH SERPL DL<=0.05 MIU/L-ACNC: 2.46 UIU/ML (ref 0.4–4.2)
WBC # BLD: 5.4 THOU/MM3 (ref 4.8–10.8)

## 2020-05-20 PROCEDURE — 84443 ASSAY THYROID STIM HORMONE: CPT

## 2020-05-20 PROCEDURE — 84153 ASSAY OF PSA TOTAL: CPT

## 2020-05-20 PROCEDURE — 85027 COMPLETE CBC AUTOMATED: CPT

## 2020-05-20 PROCEDURE — 80061 LIPID PANEL: CPT

## 2020-05-20 PROCEDURE — 80053 COMPREHEN METABOLIC PANEL: CPT

## 2020-05-20 PROCEDURE — 36415 COLL VENOUS BLD VENIPUNCTURE: CPT

## 2020-05-26 ENCOUNTER — OFFICE VISIT (OUTPATIENT)
Dept: FAMILY MEDICINE CLINIC | Age: 65
End: 2020-05-26
Payer: COMMERCIAL

## 2020-05-26 VITALS
WEIGHT: 220.4 LBS | RESPIRATION RATE: 16 BRPM | HEIGHT: 66 IN | HEART RATE: 81 BPM | OXYGEN SATURATION: 97 % | SYSTOLIC BLOOD PRESSURE: 112 MMHG | TEMPERATURE: 97.9 F | DIASTOLIC BLOOD PRESSURE: 84 MMHG | BODY MASS INDEX: 35.42 KG/M2

## 2020-05-26 PROCEDURE — 99396 PREV VISIT EST AGE 40-64: CPT | Performed by: FAMILY MEDICINE

## 2020-05-26 RX ORDER — RANITIDINE 150 MG/1
150 TABLET ORAL 2 TIMES DAILY
COMMUNITY
End: 2020-11-17 | Stop reason: ALTCHOICE

## 2020-05-26 NOTE — PATIENT INSTRUCTIONS
yours.  · They are sick less. For babies and small children, living smoke-free means they're less likely to have ear infections, pneumonia, and bronchitis. · If you're a woman who is or will be pregnant someday, quitting smoking means a healthier . · Children who are close to you are less likely to become adult smokers. Where can you learn more? Go to https://[employer]etise.compepicew[employer]. org and sign in to your Sign[employer] account. Enter [phone] in the MindQuilt box to learn more about \"Learning About Benefits From Quitting Smoking. \"     If you do not have an account, please click on the \"Sign Up Now\" link. Current as of: 2020               Content Version: 12.5  © 2233-5108 Healthwise, Incorporated. Care instructions adapted under license by [facility] ([facility]). If you have questions about a medical condition or this instruction, always ask your healthcare professional. [place] [address] any warranty or liability for your use of this information.

## 2020-05-26 NOTE — PROGRESS NOTES
[address] [place] [address] Varghese Morse  Dept: [phone]  Dept Fax: [phone]  Loc: Reese Reyes [address] is a 62 y.o. male who presents today for:  Chief Complaint   Patient presents with    Annual Exam           HPI:     HPI    Well Adult Physical: Patient here for a comprehensive physical exam.The patient reports all chronic issues are well controlled on current medications. Do you take any herbs or supplements that were not prescribed by a doctor? no Are you taking calcium supplements? no Are you taking aspirin daily? no   History:  Any STD's in the past? none    Occasional tinnitus becoming more frequent and louder. Reviewed chart forpast medical history , surgical history , allergies, social history , family history and medications. Health Maintenance   Topic Date Due    Lipid screen  05/20/2021    Potassium monitoring  05/20/2021    Creatinine monitoring  05/20/2021    DTaP/Tdap/Td vaccine (2 - Td) 11/22/2026    Colon cancer screen colonoscopy  10/30/2028    Flu vaccine  Completed    Shingles Vaccine  Completed    Hepatitis A vaccine  Aged Out    Hepatitis B vaccine  Aged Out    Hib vaccine  Aged Out    Meningococcal (ACWY) vaccine  Aged Out    Pneumococcal 0-64 years Vaccine  Aged Out    Hepatitis C screen  Discontinued    HIV screen  Discontinued       Subjective:      Constitutional:Negative for fever, chills, diaphoresis, activity change, appetite change and fatigue. HENT: Negative for hearing loss, ear pain, congestion, sore throat, rhinorrhea, postnasal drip and ear discharge. Eyes: Negative for photophobia and visual disturbance. Respiratory: Negative for cough, chest tightness, shortness of breath and wheezing. Cardiovascular: Negative for chest pain and leg swelling. Gastrointestinal: Negative for nausea, vomiting, abdominal pain, diarrhea and constipation.

## 2020-08-24 ENCOUNTER — HOSPITAL ENCOUNTER (OUTPATIENT)
Age: 65
Discharge: HOME | End: 2020-08-24
Payer: COMMERCIAL

## 2020-08-24 ENCOUNTER — NURSE TRIAGE (OUTPATIENT)
Dept: OTHER | Facility: CLINIC | Age: 65
End: 2020-08-24

## 2020-08-24 ENCOUNTER — TELEPHONE (OUTPATIENT)
Dept: FAMILY MEDICINE CLINIC | Age: 65
End: 2020-08-24

## 2020-08-24 DIAGNOSIS — Z20.822 ENCOUNTER FOR LABORATORY TESTING FOR COVID-19 VIRUS: ICD-10-CM

## 2020-08-24 PROCEDURE — U0003 INFECTIOUS AGENT DETECTION BY NUCLEIC ACID (DNA OR RNA); SEVERE ACUTE RESPIRATORY SYNDROME CORONAVIRUS 2 (SARS-COV-2) (CORONAVIRUS DISEASE [COVID-19]), AMPLIFIED PROBE TECHNIQUE, MAKING USE OF HIGH THROUGHPUT TECHNOLOGIES AS DESCRIBED BY CMS-2020-01-R: HCPCS

## 2020-08-24 NOTE — TELEPHONE ENCOUNTER
Patient aware and voiced understanding, no concerns voiced at this time. Testing has been ordered, pt will get done today.

## 2020-08-24 NOTE — TELEPHONE ENCOUNTER
Spoke with patient. He has had a dry cough x 2 weeks. Mainly notices while sitting. Occassionally coughing up some phlegm. Patient denies having fever, no SOB and no V/D. Had a little headache yesterday and some sinus fullness-more so on the right. Patient is taking Allegra, saline nasal spray 2-3 x daily, Flonase 1 spray each nostril daily and Mucinex BID. Patient states he is feeling worse in the past couple days.     Patient is currently at [facility] getting [address]

## 2020-08-24 NOTE — TELEPHONE ENCOUNTER
Reason for Disposition   Patient wants to be seen    Answer Assessment - Initial Assessment Questions  1. ONSET: \"When did the cough begin? \"       Cough and sore throat started a couple of weeks ago, now worsening  2. SEVERITY: \"How bad is the cough today? \"      Cough is sporadic. Once in a while will cough up phlegm  3. RESPIRATORY DISTRESS: \"Describe your breathing. \"     No breathing concens  4. FEVER: \"Do you have a fever? \" If so, ask: \"What is your temperature, how was it measured, and when did it start? \"   no fever  5. HEMOPTYSIS: \"Are you coughing up any blood? \" If so ask: \"How much? \" (flecks, streaks, tablespoons, etc.)    6. TREATMENT: \"What have you done so far to treat the cough? \" (e.g., meds, fluids, humidifier)    7. CARDIAC HISTORY: \"Do you have any history of heart disease? \" (e.g., heart attack, congestive heart failure)    no  8. LUNG HISTORY: \"Do you have any history of lung disease? \"  (e.g., pulmonary embolus, asthma, emphysema)  no  9. PE RISK FACTORS: \"Do you have a history of blood clots? \" (or: recent major surgery, recent prolonged travel, bedridden)    10. OTHER SYMPTOMS: \"Do you have any other symptoms? (e.g., runny nose, wheezing, chest pain)    Some sore throat and nasal drainage  11. PREGNANCY: \"Is there any chance you are pregnant? \" \"When was your last menstrual period? \"   na  12. TRAVEL: \"Have you traveled out of the country in the last month? \" (e.g., travel history, exposures)    Protocols used: COUGH-ADULT-[place]    Received call from [facility]. Pt calling with cough for a couple of weeks. Also, with a sore throat and nasal discharge. Cough is sporadic. No breathing concerns, no fever. Recommend pt is seen, call sooner if worsens. Call soft transferred to [address] to schedule appointment. Please do not reply to the triage nurse through this encounter. Any subsequent communication should be directly with the patient.

## 2020-08-24 NOTE — TELEPHONE ENCOUNTER
Patient called in stating he has had a scratchy throat and slight cough x 1 week. No other symptoms. He says he gets allergies every year and thinks that this may be them coming back for the year. He is taking musinex x 1 week and always takes Allegra. [facility] [place]. Please call if he needs to be seen or if a script can be sent in. Please advise.     Call pt [phone]

## 2020-08-25 RX ORDER — CEFDINIR 300 MG/1
300 CAPSULE ORAL 2 TIMES DAILY
Qty: 20 CAPSULE | Refills: 0 | Status: SHIPPED | OUTPATIENT
Start: 2020-08-25 | End: 2020-09-04

## 2020-08-25 RX ORDER — METHYLPREDNISOLONE 4 MG/1
TABLET ORAL
Qty: 1 KIT | Refills: 0 | Status: SHIPPED | OUTPATIENT
Start: 2020-08-25 | End: 2020-08-31

## 2020-08-26 LAB — SARS-COV-2: NOT DETECTED

## 2020-08-27 RX ORDER — OMEPRAZOLE 20 MG/1
20 CAPSULE, DELAYED RELEASE ORAL DAILY
Qty: 30 CAPSULE | Refills: 5 | Status: SHIPPED | OUTPATIENT
Start: 2020-08-27 | End: 2022-08-31

## 2020-08-27 NOTE — TELEPHONE ENCOUNTER
Pt requested a refill on his Omeprazole sent to [facility] in [address] [facility]. He was wondering if he could get a year supply.

## 2020-08-27 NOTE — TELEPHONE ENCOUNTER
----- Message from Aleida Bonner, [address] [place] sent at 8/27/2020 10:27 AM EDT -----  Patient aware and voiced understanding. Pt was wondering if you would call him something into [facility] in [place]. He still has a cough, sore throat, stuffy nose and some drainage.

## 2020-10-26 RX ORDER — ATORVASTATIN CALCIUM 40 MG/1
40 TABLET, FILM COATED ORAL DAILY
Qty: 90 TABLET | Refills: 3 | OUTPATIENT
Start: 2020-10-26

## 2020-10-26 RX ORDER — FLUTICASONE PROPIONATE 50 MCG
1 SPRAY, SUSPENSION (ML) NASAL DAILY
Qty: 3 BOTTLE | Refills: 3 | OUTPATIENT
Start: 2020-10-26

## 2020-10-26 RX ORDER — HYDROCHLOROTHIAZIDE 25 MG/1
25 TABLET ORAL DAILY
Qty: 90 TABLET | Refills: 3 | OUTPATIENT
Start: 2020-10-26

## 2020-10-26 RX ORDER — LOSARTAN POTASSIUM 50 MG/1
TABLET ORAL
Qty: 90 TABLET | Refills: 3 | OUTPATIENT
Start: 2020-10-26

## 2020-10-26 RX ORDER — FEXOFENADINE HCL 180 MG/1
180 TABLET ORAL DAILY
Qty: 90 TABLET | Refills: 3 | OUTPATIENT
Start: 2020-10-26

## 2020-10-26 NOTE — TELEPHONE ENCOUNTER
Coby Weston called requesting a refill on the following medications:  Requested Prescriptions     Pending Prescriptions Disp Refills    losartan (COZAAR) 50 MG tablet 90 tablet 3     Sig: Take 1 tablet by mouth daily    atorvastatin (LIPITOR) 40 MG tablet 90 tablet 3     Sig: Take 1 tablet by mouth daily    fexofenadine (ALLEGRA) 180 MG tablet 90 tablet 3     Sig: Take 1 tablet by mouth daily    hydroCHLOROthiazide (HYDRODIURIL) 25 MG tablet 90 tablet 3     Sig: Take 1 tablet by mouth daily    fluticasone (FLONASE) 50 MCG/ACT nasal spray 3 Bottle 3     Si spray by Nasal route daily     Pharmacy verified: [facility]      Date of last visit: 2020  Date of next visit (if applicable): Visit date not found    Pt requests 90 day supply on all medications

## 2020-10-26 NOTE — TELEPHONE ENCOUNTER
Pt stated he was just seen in May for a wellness exam and was unsure why he needed to come in again.

## 2020-10-27 NOTE — TELEPHONE ENCOUNTER
Rx given to Mey Leyva at [facility]    Patient aware and voiced understanding, no concerns voiced at this time.

## 2020-10-27 NOTE — TELEPHONE ENCOUNTER
Appt scheduled for 11/17    Patient needs refill on Lasartan and Flonase to get him to his appt-[facility] pharmacy    Also patient is wanting to know if you want blood work done prior to his appt

## 2020-11-17 ENCOUNTER — OFFICE VISIT (OUTPATIENT)
Dept: FAMILY MEDICINE CLINIC | Age: 65
End: 2020-11-17
Payer: COMMERCIAL

## 2020-11-17 ENCOUNTER — TELEPHONE (OUTPATIENT)
Dept: FAMILY MEDICINE CLINIC | Age: 65
End: 2020-11-17

## 2020-11-17 VITALS
SYSTOLIC BLOOD PRESSURE: 116 MMHG | HEART RATE: 82 BPM | DIASTOLIC BLOOD PRESSURE: 70 MMHG | TEMPERATURE: 97.4 F | OXYGEN SATURATION: 96 % | HEIGHT: 66 IN | RESPIRATION RATE: 16 BRPM | BODY MASS INDEX: 34.52 KG/M2 | WEIGHT: 214.8 LBS

## 2020-11-17 PROCEDURE — 93000 ELECTROCARDIOGRAM COMPLETE: CPT | Performed by: FAMILY MEDICINE

## 2020-11-17 PROCEDURE — 99215 OFFICE O/P EST HI 40 MIN: CPT | Performed by: FAMILY MEDICINE

## 2020-11-17 RX ORDER — HYDROCHLOROTHIAZIDE 25 MG/1
25 TABLET ORAL DAILY
Qty: 90 TABLET | Refills: 3 | Status: SHIPPED | OUTPATIENT
Start: 2020-11-17 | End: 2021-08-04 | Stop reason: ALTCHOICE

## 2020-11-17 RX ORDER — ATORVASTATIN CALCIUM 40 MG/1
40 TABLET, FILM COATED ORAL DAILY
Qty: 90 TABLET | Refills: 3 | Status: SHIPPED | OUTPATIENT
Start: 2020-11-17 | End: 2021-11-05 | Stop reason: SDUPTHER

## 2020-11-17 RX ORDER — FLUTICASONE PROPIONATE 50 MCG
1 SPRAY, SUSPENSION (ML) NASAL DAILY
Qty: 3 BOTTLE | Refills: 3 | Status: SHIPPED | OUTPATIENT
Start: 2020-11-17 | End: 2020-12-07 | Stop reason: ALTCHOICE

## 2020-11-17 RX ORDER — LOSARTAN POTASSIUM 50 MG/1
TABLET ORAL
Qty: 90 TABLET | Refills: 3 | Status: SHIPPED | OUTPATIENT
Start: 2020-11-17 | End: 2021-11-05 | Stop reason: SDUPTHER

## 2020-11-17 RX ORDER — FEXOFENADINE HCL 180 MG/1
180 TABLET ORAL DAILY
Qty: 90 TABLET | Refills: 3 | Status: SHIPPED | OUTPATIENT
Start: 2020-11-17 | End: 2020-12-07 | Stop reason: ALTCHOICE

## 2020-11-17 RX ORDER — IPRATROPIUM BROMIDE 42 UG/1
2 SPRAY, METERED NASAL 4 TIMES DAILY
Qty: 3 BOTTLE | Refills: 0 | Status: SHIPPED | OUTPATIENT
Start: 2020-11-17 | End: 2020-12-23

## 2020-11-17 RX ORDER — LEVOCETIRIZINE DIHYDROCHLORIDE 5 MG/1
5 TABLET, FILM COATED ORAL NIGHTLY
Qty: 90 TABLET | Refills: 1 | Status: SHIPPED | OUTPATIENT
Start: 2020-11-17 | End: 2021-05-06

## 2020-11-17 NOTE — TELEPHONE ENCOUNTER
Miguel Baltazar at [address] [place] [address] [phone] called wanting to verify that you want patient to be taking both the Xyzal and the Allegra

## 2020-11-17 NOTE — PROGRESS NOTES
[address] [place] [address] Varghese Morse  Dept: [phone]  Dept Fax: [phone]  Loc: [phone]    René Rice is a 60 y.o. male who presents today for:  Chief Complaint   Patient presents with    6 Month Follow-Up           HPI:     HPI    Well Adult Physical: Patient here for a comprehensive physical exam.The patient reports all chronic issues are well controlled on current medications. Do you take any herbs or supplements that were not prescribed by a doctor? no Are you taking calcium supplements? no Are you taking aspirin daily? no   History:  Any STD's in the past? none    Occasional tinnitus becoming more frequent and louder. He is willing to return for hearing test to reassess. Sometimes associated with sore throat and PND. He does continue to have some chest pressure with exertion but after he walks for 10-15 minutes, the pain goes away. Sinus pressure comes and goes all year. Now also having headaches in the evening. Left eye pressure the past month , tylenol and motrin helps. Occasional blood in the mucus from the nose. Reviewed chart forpast medical history , surgical history , allergies, social history , family history and medications.     Health Maintenance   Topic Date Due    Lipid screen  05/20/2021    Potassium monitoring  05/20/2021    Creatinine monitoring  05/20/2021    DTaP/Tdap/Td vaccine (2 - Td) 11/22/2026    Colon cancer screen colonoscopy  06/03/2030    Flu vaccine  Completed    Shingles Vaccine  Completed    Hepatitis A vaccine  Aged Out    Hepatitis B vaccine  Aged Out    Hib vaccine  Aged Out    Meningococcal (ACWY) vaccine  Aged Out    Pneumococcal 0-64 years Vaccine  Aged Out    Hepatitis C screen  Discontinued    HIV screen  Discontinued       Subjective:      Constitutional:Negative for fever, chills, diaphoresis, activity change, appetite change and fatigue. HENT: Negative for hearing loss, ear pain, congestion, sore throat, rhinorrhea, postnasal drip and ear discharge. Eyes: Negative for photophobia and visual disturbance. Respiratory: Negative for cough, chest tightness, shortness of breath and wheezing. Cardiovascular: Negative for chest pain and leg swelling. Gastrointestinal: Negative for nausea, vomiting, abdominal pain, diarrhea and constipation. Genitourinary: Negative for dysuria, urgency and frequency. Neurological: Negative for weakness, light-headedness and headaches. Psychiatric/Behavioral: Negative for sleep disturbance.      :     Vitals:    11/17/20 0830   BP: 116/70   Site: Left Upper Arm   Position: Sitting   Cuff Size: Large Adult   Pulse: 82   Resp: 16   Temp: 97.4 °F (36.3 °C)   TempSrc: Temporal   SpO2: 96%   Weight: 214 lb 12.8 oz (97.4 kg)   Height: 5' 5.98\" (1.676 m)     Wt Readings from Last 3 Encounters:   11/17/20 214 lb 12.8 oz (97.4 kg)   05/26/20 220 lb 6.4 oz (100 kg)   03/13/20 223 lb 3.2 oz (101.2 kg)       Physical Exam  Physical Exam   Constitutional: Vital signs are normal. He appears well-developed and well-nourished. He is active. HENT:   Head: Normocephalic and atraumatic. Right Ear: Tympanic membrane, external ear and ear canal normal. No drainage or tenderness. Left Ear: Tympanic membrane, external ear and ear canal normal. No drainage or tenderness. Nose: Nose normal. No mucosal edema or rhinorrhea. Mouth/Throat: Uvula is midline, oropharynx is clear and moist and mucous membranes are normal. Mucous membranes are not pale. Normal dentition. No posterior oropharyngeal edema or posterior oropharyngeal erythema. Eyes: Lids are normal. Right eye exhibits no chemosis and no discharge. Left eye exhibits no chemosis and no drainage. Right conjunctiva has no hemorrhage. Left conjunctiva has no hemorrhage. Right eye exhibits normal extraocular motion. Left eye exhibits normal extraocular motion. colitis without complication (HCC)    Chest pain, exertional  -     EKG 12 Lead  -     Holter Monitor 48 Hour; Future  -     CARDIAC STRESS TEST EXERCISE ONLY    Tinnitus of both ears  -     Audiometry with tympanometry; Future    Abnormal EKG  -     CARDIAC STRESS TEST EXERCISE ONLY    RBBB  -     CARDIAC STRESS TEST EXERCISE ONLY    alternate flonase and atrovent nasal.  Switch allegra for xyzal    Continue healthy diet and exercise  Aspirin daily    Discussed use, benefit, and side effectsof prescribed medications. All patient questions answered. Pt voiced understanding. Reviewed health maintenance. Instructed to continue current medications, diet and exercise. Patient agreed with treatment plan. Followup as directed.      Over 50 minutes spent with patient with >50% spent in counseling and coordination of care    Electronically signed by Nicolas Menon MD

## 2020-11-18 ENCOUNTER — TELEPHONE (OUTPATIENT)
Dept: FAMILY MEDICINE CLINIC | Age: 65
End: 2020-11-18

## 2020-11-18 NOTE — TELEPHONE ENCOUNTER
Patient called stating after walking on his treadmill today he does not think he can walk for the stress test at the hospital. Patient asking if this order could be changed. Please advise.

## 2020-11-18 NOTE — TELEPHONE ENCOUNTER
Order changed to Chad Williamson. Patient aware will need to be certed with insurance. Notes sent to PA department. No concerns voiced.

## 2020-11-24 ENCOUNTER — HOSPITAL ENCOUNTER (OUTPATIENT)
Dept: NON INVASIVE DIAGNOSTICS | Age: 65
Discharge: HOME | End: 2020-11-24
Payer: COMMERCIAL

## 2020-11-24 VITALS — HEIGHT: 66 IN | WEIGHT: 210 LBS | BODY MASS INDEX: 33.75 KG/M2

## 2020-11-24 PROCEDURE — 3430000000 HC RX DIAGNOSTIC RADIOPHARMACEUTICAL: Performed by: FAMILY MEDICINE

## 2020-11-24 PROCEDURE — 93225 XTRNL ECG REC<48 HRS REC: CPT

## 2020-11-24 PROCEDURE — A9500 TC99M SESTAMIBI: HCPCS | Performed by: FAMILY MEDICINE

## 2020-11-24 PROCEDURE — 6360000002 HC RX W HCPCS

## 2020-11-24 PROCEDURE — 93226 XTRNL ECG REC<48 HR SCAN A/R: CPT

## 2020-11-24 PROCEDURE — 78452 HT MUSCLE IMAGE SPECT MULT: CPT

## 2020-11-24 PROCEDURE — 93017 CV STRESS TEST TRACING ONLY: CPT | Performed by: INTERNAL MEDICINE

## 2020-11-24 RX ADMIN — Medication 9.8 MILLICURIE: at 13:15

## 2020-11-24 RX ADMIN — Medication 31.1 MILLICURIE: at 14:40

## 2020-11-24 NOTE — PROCEDURES
The skin was prepped and a holter monitor was applied. The patient was instructed on the documentation of symptoms and the purpose of the holter as well as the things to avoid while wearing the holter. The patient was instructed to remove and return the holter on 11/26/2020.   The serial number of the holter that was applied is [id number]

## 2020-11-29 ENCOUNTER — TELEPHONE (OUTPATIENT)
Dept: FAMILY MEDICINE CLINIC | Age: 65
End: 2020-11-29

## 2020-11-30 ENCOUNTER — TELEPHONE (OUTPATIENT)
Dept: FAMILY MEDICINE CLINIC | Age: 65
End: 2020-11-30

## 2020-11-30 LAB
ACQUISITION DURATION: NORMAL S
AVERAGE HEART RATE: 65 BPM
HOOKUP DATE: NORMAL
HOOKUP TIME: NORMAL
MAX HEART RATE TIME/DATE: NORMAL
MAX HEART RATE: 103 BPM
MIN HEART RATE TIME/DATE: NORMAL
MIN HEART RATE: 34 BPM
NUMBER OF QRS COMPLEXES: NORMAL
NUMBER OF SUPRAVENTRICULAR BEATS IN RUNS: 0
NUMBER OF SUPRAVENTRICULAR COUPLETS: 0
NUMBER OF SUPRAVENTRICULAR ECTOPICS: 0
NUMBER OF SUPRAVENTRICULAR ISOLATED BEATS: 0
NUMBER OF SUPRAVENTRICULAR RUNS: 0
NUMBER OF VENTRICULAR BEATS IN RUNS: 0
NUMBER OF VENTRICULAR BIGEMINAL CYCLES: 0
NUMBER OF VENTRICULAR COUPLETS: 0
NUMBER OF VENTRICULAR ECTOPICS: 99
NUMBER OF VENTRICULAR ISOLATED BEATS: 99
NUMBER OF VENTRICULAR RUNS: 0

## 2020-11-30 NOTE — TELEPHONE ENCOUNTER
Mena from Dr Martin Hector office called stating patients stress test 11/24 came back positive.   [phone] Opt [address]    Dr Martin Hector is wanting to know if you would like for him to see that patient

## 2020-11-30 NOTE — TELEPHONE ENCOUNTER
[id number] [address] for occasional beer    If he starts to do better then he can switch back to allegra and go back to xyzal at any time.     Call patient

## 2020-11-30 NOTE — TELEPHONE ENCOUNTER
Patient aware and voiced understanding, no concerns voiced at this time. Pt is asking how severe this is and what activities he can/can't do. Pt also stated that he was taken off Allegra and was put on Xyzal, he noticed that it said to not drink while taking this medication. Pt is wanting to know how severe is this as he likes to have a beer every once in awhile. He also is wanting to know if he starts to feel better can he stop this medication and return to the [facility] or stop the medication for awhile and see how he does?

## 2020-12-07 ENCOUNTER — OFFICE VISIT (OUTPATIENT)
Dept: CARDIOLOGY CLINIC | Age: 65
End: 2020-12-07
Payer: COMMERCIAL

## 2020-12-07 VITALS
SYSTOLIC BLOOD PRESSURE: 112 MMHG | WEIGHT: 208.2 LBS | HEART RATE: 88 BPM | BODY MASS INDEX: 33.46 KG/M2 | HEIGHT: 66 IN | DIASTOLIC BLOOD PRESSURE: 62 MMHG

## 2020-12-07 PROCEDURE — 99204 OFFICE O/P NEW MOD 45 MIN: CPT | Performed by: INTERNAL MEDICINE

## 2020-12-07 NOTE — PROGRESS NOTES
([facility] 75.). Social History  Edwige Simon  reports that he quit smoking about 32 years ago. He has a 10.00 pack-year smoking history. He uses smokeless tobacco. He reports current alcohol use. He reports that he does not use drugs. Family History  Edwige Simon family history includes Cancer in his mother and paternal grandfather; Diabetes in his maternal grandfather; Heart Disease in his maternal grandmother and paternal grandfather. Past Surgical History   Past Surgical History:   Procedure Laterality Date    CARDIAC CATHETERIZATION      COLONOSCOPY      4/2010=surveillance for ulcerative collitis    FINGER TRIGGER RELEASE      KNEE ARTHROSCOPY      ROTATOR CUFF REPAIR      TONSILLECTOMY         Subjective:     REVIEW OF SYSTEMS  Constitutional: denies sweats, chills and fever  HENT: denies  congestion, sinus pressure, sneezing and sore throat. Eyes: denies  pain, discharge, redness and itching. Respiratory: denies apnea, cough  Gastrointestinal: denies blood in stool, constipation, diarrhea   Endocrine: denies cold intolerance, heat intolerance, polydipsia. Genitourinary: denies dysuria, enuresis, flank pain and hematuria. Musculoskeletal: denies arthralgias, joint swelling and neck pain. Neurological: denies numbness and headaches. Psychiatric/Behavioral: denies agitation, confusion, decreased concentration and dysphoric mood    All others reviewed and are negative. Objective:     /62   Pulse 88   Ht 5' 6\" (1.676 m)   Wt 208 lb 3.2 oz (94.4 kg)   BMI 33.60 kg/m²     Wt Readings from Last 3 Encounters:   12/07/20 208 lb 3.2 oz (94.4 kg)   11/24/20 210 lb (95.3 kg)   11/17/20 214 lb 12.8 oz (97.4 kg)     BP Readings from Last 3 Encounters:   12/07/20 112/62   11/17/20 116/70   05/26/20 112/84       PHYSICAL EXAM  Constitutional: Oriented to person, place, and time. Appears well-developed and well-nourished. HENT:   Head: Normocephalic and atraumatic.    Eyes: EOM are normal. Pupils are Diagnosis Orders   1. Dyspnea, unspecified type       Dyspnea  Abnormal holter with intermittent 2:1 conduction  Abnormal stress test with finding of TID  Intermittent chest pain, mild  Former smoker  HTN  HLD  Ulcerative colitis     Intermittent mild chest pain   Has TID on stress test but no ischemia  Still has intemittent chest pains  Discussed about C  R/B/A were discussed  Patient would like to proceed  Will need sleep study  If both are negative will need EP study to evaluate conduction  Will need echo  Continue Aspirin, statin, HCTZ/losartan  The patient is asked to make an attempt to improve diet and exercise patterns to aid in medical management of this problem. Advised more plant based nutrition/meditarrean diet   Advised patient to call office or seek immediate medical attention if there is any new onset of  any chest pain, sob, palpitations, lightheadedness, dizziness, orthopnea, PND or pedal edema. All medication side effects were discussed in details. Thank youfor allowing me to participate in the care of this patient. Please do not hesitate to contact me for any further questions. Return in about 4 weeks (around 1/4/2021), or if symptoms worsen or fail to improve, for Regular follow up, Review testing.        Electronically signed by Barbara Mcpherson MD [facility]  12/7/2020 at 7:59 AM EST

## 2020-12-16 ENCOUNTER — HOSPITAL ENCOUNTER (OUTPATIENT)
Age: 65
Setting detail: SPECIMEN
Discharge: HOME | End: 2020-12-16
Payer: COMMERCIAL

## 2020-12-16 DIAGNOSIS — Z13.9 SCREENING FOR CONDITION: ICD-10-CM

## 2020-12-16 PROCEDURE — U0003 INFECTIOUS AGENT DETECTION BY NUCLEIC ACID (DNA OR RNA); SEVERE ACUTE RESPIRATORY SYNDROME CORONAVIRUS 2 (SARS-COV-2) (CORONAVIRUS DISEASE [COVID-19]), AMPLIFIED PROBE TECHNIQUE, MAKING USE OF HIGH THROUGHPUT TECHNOLOGIES AS DESCRIBED BY CMS-2020-01-R: HCPCS

## 2020-12-18 LAB — SARS-COV-2: NOT DETECTED

## 2020-12-21 ENCOUNTER — PREP FOR PROCEDURE (OUTPATIENT)
Dept: CARDIOLOGY | Age: 65
End: 2020-12-21

## 2020-12-21 RX ORDER — SODIUM CHLORIDE 9 MG/ML
50 INJECTION, SOLUTION INTRAVENOUS CONTINUOUS
Status: CANCELLED | OUTPATIENT
Start: 2020-12-21

## 2020-12-21 RX ORDER — NITROGLYCERIN 0.4 MG/1
0.4 TABLET SUBLINGUAL EVERY 5 MIN PRN
Status: CANCELLED | OUTPATIENT
Start: 2020-12-21

## 2020-12-21 RX ORDER — DIPHENHYDRAMINE HYDROCHLORIDE 50 MG/ML
50 INJECTION INTRAMUSCULAR; INTRAVENOUS ONCE
Status: CANCELLED | OUTPATIENT
Start: 2020-12-21 | End: 2020-12-21

## 2020-12-21 RX ORDER — ASPIRIN 325 MG
325 TABLET ORAL ONCE
Status: CANCELLED | OUTPATIENT
Start: 2020-12-21 | End: 2020-12-21

## 2020-12-21 RX ORDER — SODIUM CHLORIDE 0.9 % (FLUSH) 0.9 %
10 SYRINGE (ML) INJECTION EVERY 12 HOURS SCHEDULED
Status: CANCELLED | OUTPATIENT
Start: 2020-12-21

## 2020-12-21 RX ORDER — SODIUM CHLORIDE 0.9 % (FLUSH) 0.9 %
10 SYRINGE (ML) INJECTION PRN
Status: CANCELLED | OUTPATIENT
Start: 2020-12-21

## 2020-12-23 ENCOUNTER — TELEPHONE (OUTPATIENT)
Dept: CARDIOLOGY CLINIC | Age: 65
End: 2020-12-23

## 2020-12-23 RX ORDER — FLUTICASONE PROPIONATE 50 MCG
1 SPRAY, SUSPENSION (ML) NASAL DAILY
COMMUNITY
End: 2021-02-10

## 2020-12-23 NOTE — TELEPHONE ENCOUNTER
Pt asking about teeth cleaning on 12/30/2020. Pt already takes antibiotic before, just making sure its ok to do since he just had stents? Can he proceed and does he need to hold his thinners?

## 2020-12-28 ENCOUNTER — INITIAL CONSULT (OUTPATIENT)
Dept: PULMONOLOGY | Age: 65
End: 2020-12-28
Payer: COMMERCIAL

## 2020-12-28 VITALS
HEART RATE: 83 BPM | OXYGEN SATURATION: 98 % | DIASTOLIC BLOOD PRESSURE: 74 MMHG | WEIGHT: 205 LBS | SYSTOLIC BLOOD PRESSURE: 110 MMHG | TEMPERATURE: 97.9 F | BODY MASS INDEX: 32.95 KG/M2 | HEIGHT: 66 IN

## 2020-12-28 PROCEDURE — 99203 OFFICE O/P NEW LOW 30 MIN: CPT | Performed by: INTERNAL MEDICINE

## 2020-12-28 NOTE — PATIENT INSTRUCTIONS
Sleep apnea means that you frequently stop breathing for 10 seconds or longer during sleep. It can be mild to severe, based on the number of times an hour that you stop breathing or have slowed breathing. Blocked or narrowed airways in your nose, mouth, or throat can cause sleep apnea. Your airway can become blocked when your throat muscles and tongue relax during sleep. You can treat sleep apnea at home by making lifestyle changes. You also can use a CPAP breathing machine that keeps tissues in the throat from blocking your airway. Or your doctor may suggest that you use a breathing device while you sleep. It helps keep your airway open. This could be a device that you put in your mouth. In some cases, surgery may be needed to remove enlarged tissues in the throat. Follow-up care is a key part of your treatment and safety. Be sure to make and go to all appointments, and call your doctor if you are having problems. It's also a good idea to know your test results and keep a list of the medicines you take. How can you care for yourself at home? · Lose weight, if needed. It may reduce the number of times you stop breathing or have slowed breathing. · Sleep on your side. It may stop mild apnea. If you tend to roll onto your back, sew a pocket in the back of your pajama top. Put a tennis ball into the pocket, and stitch the pocket shut. This will help keep you from sleeping on your back. · Avoid alcohol and medicines such as sleeping pills and sedatives before bed. · Do not smoke. Smoking can make sleep apnea worse. If you need help quitting, talk to your doctor about stop-smoking programs and medicines. These can increase your chances of quitting for good. · Prop up the head of your bed 4 to 6 inches by putting bricks under the legs of the bed. · Treat breathing problems, such as a stuffy nose, caused by a cold or allergies. · Try a continuous positive airway pressure (CPAP) breathing machine if your doctor recommends it. The machine keeps your airway open when you sleep. · If CPAP does not work for you, ask your doctor if you can try other breathing machines. A bilevel positive airway pressure machine uses one type of air pressure for breathing in and another type for breathing out. Another device raises or lowers air pressure as needed while you breathe. · Talk to your doctor if:  ? Your nose feels dry or bleeds when you use one of these machines. You may need to increase moisture in the air. A humidifier may help. ? Your nose is runny or stuffy from using a breathing machine. Decongestants or a corticosteroid nasal spray may help. ? You are sleepy during the day and it gets in the way of the normal things you do. Do not drive when you are drowsy. When should you call for help? Watch closely for changes in your health, and be sure to contact your doctor if:    · You still have sleep apnea even though you have made lifestyle changes.     · You are thinking of trying a device such as CPAP.     · You are having problems using a CPAP or similar machine. Where can you learn more? Go to https://CastingDB.Outright. org and sign in to your [employer] account. Enter I531 in the [employer] box to learn more about \"Sleep Apnea: Care Instructions. \"     If you do not have an account, please click on the \"Sign Up Now\" link. Current as of: February 24, 2020               Content Version: 12.6  © 2006-2020 [employer]. Care instructions adapted under license by [facility] If you have questions about a medical condition or this instruction, always ask your healthcare professional. [place] [address] any warranty or liability for your use of this information. Patient Education        Learning About Sleep Apnea  What is it? Sleep apnea means that breathing stops for short periods during sleep. When you stop breathing or have reduced airflow into your lungs during sleep, you don't sleep well and you can be very tired during the day. The oxygen levels in your blood may go down, and carbon dioxide levels go up. It may lead to other problems, such as high blood pressure and heart disease. Sleep apnea can range from mild to severe, based on how often breathing stops during sleep. Breathing may stop as few as 5 times an hour (mild apnea) to 30 or more times an hour (severe apnea). Obstructive sleep apnea is the most common type. This most often occurs because your airways are blocked or partly blocked. Central sleep apnea is less common. It happens when the brain has trouble controlling breathing. Some people have both types. That's called complex sleep apnea. What are the symptoms? There are symptoms of sleep apnea that you may notice and symptoms that others may notice when you're asleep. Symptoms you may notice include:  · Feeling extremely sleepy during the day. · Feeling unrefreshed or tired after a night's sleep. · Problems with memory and concentration, or mood changes. · Morning or night headaches. · Heartburn or a sour taste in your mouth at night. · Swelling of the legs. · Getting up often during the night to urinate. · A dry mouth or sore throat in the morning. Your bed partner may notice that you:  · Have episodes of not breathing. · Snore loudly. Almost all people who have sleep apnea snore. But not all people who snore have sleep apnea. · Toss and turn during sleep. · Have nighttime choking or gasping spells. How is it diagnosed? Your doctor will probably do a physical exam and ask about your past health. He or she may also ask you or your bed partner about your snoring and sleep behavior and how tired you feel during the day. Your doctor may suggest a sleep study. Sleep studies are a series of tests that look at what happens to the body during sleep. They check for how often you stop breathing or have too little air flowing into your lungs during sleep. They also find out how much oxygen you have in your blood during sleep. A sleep study may take place in your home. Or it might take place at a sleep center, where you will spend the night. How is it treated? Sleep apnea is often treated with devices that deliver air through a mask to help keep your airways open. These include:  · Continuous positive airway pressure (CPAP). This increases air pressure in your throat. It keeps your airway open when you breathe in. It's the most common device. · Bilevel positive airway pressure (BiPAP). This uses different air pressures when you breathe in and out. · Adaptive servo ventilation (ASV). It senses pauses in breathing and adjusts air pressure. It's mostly used for central sleep apnea. If your tonsils or other tissues are blocking your airway, your doctor may suggest surgery to open the airway. How can you care for yourself? You may be able to treat mild sleep apnea by making changes in how you live and the way you sleep. For example, it may help to:  · Lose weight if you are overweight. · Sleep on your side, not your back. · Avoid alcohol and medicines such as sedatives before bed. You may also try an oral breathing device. It helps keep your airways open while you sleep. Where can you learn more? Go to https://Facteryarnulfo.Gram Games. org and sign in to your [employer] account. Enter S121 in the [employer] box to learn more about \"Learning About Sleep Apnea. \"     If you do not have an account, please click on the \"Sign Up Now\" link. Current as of: February 24, 2020               Content Version: 12.6  © 3617-4948 [employer]. Care instructions adapted under license by [facility] ([facility]). If you have questions about a medical condition or this instruction, always ask your healthcare professional. [address] any warranty or liability for your use of this information. Patient Education        Sleep Studies: About This Test  What is it? Sleep studies are tests that watch what happens to your body during sleep. These studies usually are done in a sleep lab. Sleep labs are often located in hospitals. Sleep studies you do at home can be done with portable equipment. But they may not give the same results as a sleep lab. Why is this test done? Sleep studies are done for people who say that sleep isn't restful or that they are tired all day. These studies can help find sleep problems, such as:  · Sleep apnea. This means that an adult regularly stops breathing during sleep for 10 seconds or longer. · Excessive snoring. · Problems staying awake, such as narcolepsy. · Problems with nighttime behaviors. These include sleepwalking, night terrors, bed-wetting, and REM behavior disorders (RBD). · Conditions such as periodic limb movement disorder. This is repeated muscle twitching of the feet, arms, or legs during sleep. · Seizures that occur at night (nocturnal seizures). How do you prepare for the test?  · You may be asked to keep a sleep diary for 1 to 2 weeks before your sleep study. · Don't take any naps for 2 to 3 days before your test.  · You may be asked to avoid food or drinks with caffeine for a day or two before your test.  · Take a shower or bath before your test, but don't use sprays, oils, or gels on your hair. Don't wear makeup, fingernail polish, or fake nails. · Pack and take along a small overnight bag with personal items, such as a toothbrush, a comb, favorite pillows or blankets, and a book. You can wear your own nightclothes. · If you will have portable sleep monitoring, your doctor will explain how to use the equipment at home. How is the test done? · In the sleep lab, you will be in a private room, much like a hotel room. · Small pads or patches called electrodes will be placed on your head and body with a small amount of glue and tape. These will record things like brain activity, eye movement, oxygen levels, and snoring. · Soft elastic belts will be placed around your chest and belly to measure your breathing. · Your blood oxygen levels will be checked by a small clip (oximeter) placed either on the tip of your index finger or on your earlobe. · If you have sleep apnea, you may wear a mask that is connected to a continuous positive airway pressure (CPAP) machine. · Depending on the type of test, you will be allowed to sleep through the night or you'll be awakened periodically and asked to stay awake for a while. · If you use portable sleep monitoring, follow the instructions your doctor gave you. How long does the test take? · You will stay in the sleep lab overnight. For some tests, you will also stay part of the next day. What happens after the test?  · You will be able to go home right away. · You may not sleep well during the test and may be tired the next day. · You can go back to your usual activities right away. · After your sleep problem has been identified, you may need a second study if your doctor orders treatment such as CPAP. Follow-up care is a key part of your treatment and safety. Be sure to make and go to all appointments, and call your doctor if you are having problems. It's also a good idea to keep a list of the medicines you take. Ask your doctor when you can expect to have your test results. Where can you learn more?

## 2021-01-05 ENCOUNTER — TELEPHONE (OUTPATIENT)
Dept: CARDIOLOGY CLINIC | Age: 66
End: 2021-01-05

## 2021-01-05 NOTE — TELEPHONE ENCOUNTER
Patient stated that Dr. Fabiana Villa was wanting him to have a sleep study done. He is having it on 01/06/21 and is scheduled for an appt on 01/07/2021 with Dr. Fabiana Villa. The patient is wanting to know if he should wait til next week to be seen so that he will be able to see the results of the sleep study.   Please advise  [phone]

## 2021-01-06 ENCOUNTER — HOSPITAL ENCOUNTER (OUTPATIENT)
Dept: SLEEP CENTER | Age: 66
Discharge: HOME | End: 2021-01-08
Payer: COMMERCIAL

## 2021-01-06 DIAGNOSIS — I25.10 LAD STENOSIS: ICD-10-CM

## 2021-01-06 DIAGNOSIS — Z98.61 H/O CORONARY ANGIOPLASTY: ICD-10-CM

## 2021-01-06 DIAGNOSIS — R00.1 BRADYCARDIA: ICD-10-CM

## 2021-01-06 DIAGNOSIS — G47.10 HYPERSOMNIA: ICD-10-CM

## 2021-01-06 DIAGNOSIS — E66.9 OBESITY (BMI 30-39.9): ICD-10-CM

## 2021-01-06 DIAGNOSIS — R06.83 SNORING: ICD-10-CM

## 2021-01-06 PROCEDURE — 95810 POLYSOM 6/> YRS 4/> PARAM: CPT

## 2021-01-07 LAB — STATUS: NORMAL

## 2021-01-10 NOTE — PROGRESS NOTES
[address] [id number]                               SLEEP STUDY REPORT    PATIENT NAME: Bing Mayo                   :        1955  MED REC NO:   [id number]                           ROOM:  ACCOUNT NO:   [de-identified]                           ADMIT DATE: 2021  PROVIDER:     YI Roberts [place]:  2021    DIAGNOSTIC POLYSOMNOGRAM REPORT    REFERRING PROVIDER:  Giselle Busch MD    HISTORY OF PRESENT ILLNESS:  The patient is a 81-year-old gentleman with  complaints of nonrestorative sleep, loud snoring, excessive daytime  somnolence. Weight 205 pounds, height 66 inches, BMI 33.1. METHODS:  The patient underwent digital polysomnography in compliance  with the standards and specifications from the AASM Manual including the  simultaneous recording of 3 EEG channels (F4-M1, C4-M1, and O2-M1 with  back up electrodes F3-M2, C3-M2, and O1-M2), 2 EOG channels (E1-M2, and  E2-M1,), EMG (chin, left & right leg), EKG, Nonin pulse oximetry with   less than 2 second averaging time, body position, airflow recorded by  oral-nasal thermal sensor and nasal air pressure transducer, plus  respiratory effort recorded by calibrated respiratory inductance  plethysmography (RIP), flow volume loop, sound and video. Sleep staging  and scoring followed the standard put forth by the American Academy of  Sleep Medicine and utilized the 4A obstructive hypopnea event  desaturation of 4 percent or greater. DETAILS OF THE STUDY:  Lights out 10:02 p.m., lights on 05:38 a.m. Total recording time 455 minutes, sleep period time 454 minutes, total  sleep time 438 minutes, sleep efficiency 96.1%. Latency to sleep 1.3  minutes, wake after sleep onset 16.6 minutes. Latency to REM 81.5  minutes. SLEEP STAGIN minutes in N1 sleep, 319 minutes in N2 sleep, 56  minutes in N3 sleep, 45 minutes in REM sleep. RESPIRATORY SUMMARY:  4 central apneas, 22 obstructive apneas, 240  obstructive hypopneas for an AHI of 36.4. The REM AHI was 36 and the  supine AHI was 41.3. BODY POSITION SUMMARY:  364 minutes of supine sleep, 69 minutes of left  lateral sleep. SLEEP DISTURBANCE SUMMARY:  There were 189 arousals for an arousal index  of 25.9, 146 were related to respiratory obstructions for a respiratory  arousal index of 20. PERIODIC LIMB MOVEMENT SUMMARY:  There was none. PULSE OXIMETRY SUMMARY:  Mean oxygen saturation during wakefulness  94.7%, during sleep 92.8%. Lowest oxygen saturation 82%, there were 10  minutes of oxygen saturation below 88%. ECG SUMMARY:  The patient remained in normal sinus rhythm through the  night. ASSESSMENT:  Severe obstructive sleep apnea with an AHI of 36.4  associated to nocturnal oxygen desaturation and sleep disruption. RECOMMENDATIONS:  Due to the severity of the findings, PAP therapies are  recommended. The patient will be contacted and if he is agreeable, we  will bring him back to the sleep lab for a PAP titration study.         Trudi Clinton M.D.    D: 01/09/2021 20:33:35       T: 01/10/2021 1:55:34     [email or web address]  Job#: [id number]     Doc#: [id number]    CC:

## 2021-01-11 DIAGNOSIS — G47.33 OSA (OBSTRUCTIVE SLEEP APNEA): Primary | ICD-10-CM

## 2021-01-11 NOTE — PLAN OF CARE
Hospital Facility-Based Program  [facility]/Traditional Cardiac Rehab  PHYSICIAN ORDER  Class I Level B based on research  Medical Director:  Dr. Allan Rodriguez MD     Patient Name: Michael Turcios : 1955  Referring Physician: Dr. Sowmya Navarrete  Date: 2021  Allergies: Allergies as of 2021    (No Known Allergies)        Diagnosis:  PCI of LAD on 2020    [x] [facility] with telemetry monitoring, resting and exercise        BPs & HRs with each session. Hospital setting for patient safety. [x] 72 sessions: 36 exercise sessions, 36 education sessions   [] 36 sessions: 18 exercise sessions, 18 education sessions  [] Traditional Cardiac Rehab with telemetry monitoring, resting and exercise BPs &       HRs with each session. Hospital setting for patient safety. [] 36 sessions:  32 exercise sessions, 4 education sessions     Per Patient symptoms, proceed with:   [x]Nitroglycerine 0.4mg SL every 5 minutes prn, maximum of 3, for chest pain   [x]12-lead EKG for symptoms of chest pain or noted change in heart rhythm   [x]Administer O2 per nasal cannula for symptoms of chest pain or acute dyspnea    Physician Prescribed Exercise:  Plan of Care:  Patient to attend exercise sessions with aerobic endurance and strength training for a total of 31-60 min/day, 3 days/week with supplemented 30+ minutes of aerobic exercise at home on days not participating in Cardiac Rehab. Aerobic Endurance Training  Aerobic Endurance mode (TM, AD, NS) starting at 5-8 minutes progressing by 2-3 minutes each week to a total of 15-30 minutes 2-3x/week. Arms only 5 min  Stair step increasing to 2 min  Resistance/strength training:  Hand weights starting at 1-5 lbs increasing in weight by 1-2 lbs and/or per patient tolerance weekly. Start with 8 repetitions and increase the repetitions each exercise session per patient tolerance for a total of 15 repetitions. Measurable Endurance Goal:  Aerobic endurance goal to be measured in minutes. Start endurance training per patient tolerance at 1-8 minutes per exercise type, progressing to a total of 31-60 minutes using various modes of training (see Exercise Prescription). Progression:    [x] Weekly 5-10% intensity progression, as tolerated, during cardiac rehab sessions. [x] 13-17 Rate of Perceived Exertion on the Nacho Scale (6-20). Measurable Muscular Strength Goal:  Starting at 1-5 lbs x 8 reps, progressing to 5-25 lbs x 15 reps per patient tolerance.       Electronically signed by Kathy Simon RN on 1/11/21 at 12:36 PM EST

## 2021-01-13 NOTE — TELEPHONE ENCOUNTER
Kassi Daniel called requesting a refill on the following medications:  Requested Prescriptions     Pending Prescriptions Disp Refills    metoprolol tartrate (LOPRESSOR) 25 MG tablet 90 tablet 1     Sig: Take 0.5 tablets by mouth 2 times daily    ticagrelor (BRILINTA) 90 MG TABS tablet 60 tablet 3     Sig: Take 1 tablet by mouth 2 times daily     Pharmacy verified:  .[facility]    90 day supply    Date of last visit: 12/07/20  Date of next visit (if applicable): 8/74/7329

## 2021-01-14 ENCOUNTER — OFFICE VISIT (OUTPATIENT)
Dept: PULMONOLOGY | Age: 66
End: 2021-01-14
Payer: COMMERCIAL

## 2021-01-14 VITALS
BODY MASS INDEX: 32.69 KG/M2 | DIASTOLIC BLOOD PRESSURE: 72 MMHG | OXYGEN SATURATION: 98 % | HEART RATE: 56 BPM | WEIGHT: 203.4 LBS | TEMPERATURE: 97.9 F | HEIGHT: 66 IN | SYSTOLIC BLOOD PRESSURE: 112 MMHG

## 2021-01-14 DIAGNOSIS — I25.10 CORONARY ARTERY DISEASE INVOLVING NATIVE CORONARY ARTERY OF NATIVE HEART WITHOUT ANGINA PECTORIS: ICD-10-CM

## 2021-01-14 DIAGNOSIS — J30.89 ENVIRONMENTAL AND SEASONAL ALLERGIES: ICD-10-CM

## 2021-01-14 DIAGNOSIS — G47.33 OSA (OBSTRUCTIVE SLEEP APNEA): Primary | ICD-10-CM

## 2021-01-14 DIAGNOSIS — I10 BENIGN ESSENTIAL HTN: ICD-10-CM

## 2021-01-14 DIAGNOSIS — I49.9 CARDIAC ARRHYTHMIA, UNSPECIFIED CARDIAC ARRHYTHMIA TYPE: ICD-10-CM

## 2021-01-14 PROCEDURE — 99214 OFFICE O/P EST MOD 30 MIN: CPT | Performed by: NURSE PRACTITIONER

## 2021-01-14 NOTE — PATIENT INSTRUCTIONS
Patient Education        Sleep Apnea: Care Instructions  Your Care Instructions     Sleep apnea means that you frequently stop breathing for 10 seconds or longer during sleep. It can be mild to severe, based on the number of times an hour that you stop breathing or have slowed breathing. Blocked or narrowed airways in your nose, mouth, or throat can cause sleep apnea. Your airway can become blocked when your throat muscles and tongue relax during sleep. You can treat sleep apnea at home by making lifestyle changes. You also can use a CPAP breathing machine that keeps tissues in the throat from blocking your airway. Or your doctor may suggest that you use a breathing device while you sleep. It helps keep your airway open. This could be a device that you put in your mouth. In some cases, surgery may be needed to remove enlarged tissues in the throat. Follow-up care is a key part of your treatment and safety. Be sure to make and go to all appointments, and call your doctor if you are having problems. It's also a good idea to know your test results and keep a list of the medicines you take. How can you care for yourself at home? · Lose weight, if needed. It may reduce the number of times you stop breathing or have slowed breathing. · Sleep on your side. It may stop mild apnea. If you tend to roll onto your back, sew a pocket in the back of your pajama top. Put a tennis ball into the pocket, and stitch the pocket shut. This will help keep you from sleeping on your back. · Avoid alcohol and medicines such as sleeping pills and sedatives before bed. · Do not smoke. Smoking can make sleep apnea worse. If you need help quitting, talk to your doctor about stop-smoking programs and medicines. These can increase your chances of quitting for good. · Prop up the head of your bed 4 to 6 inches by putting bricks under the legs of the bed. · Treat breathing problems, such as a stuffy nose, caused by a cold or allergies.

## 2021-01-14 NOTE — PROGRESS NOTES
[facility]    Deni Mishra, 60 y.o.  [id number]      Pt of [facility]  Nurses Notes   Ofe Chu is here today for a follow up PSG done on 2021. Study Results  Initial Study Date -  2021  AHI -  36.4    Total Events - 289  (Apneas  26  Hypopneas 240  Central  4)  LM w/Arousals - 0  Sleep Efficiency - 96.1% (Total Sleep Time - 438.0 min)  Time with Sats below 88% - 10.0 min    Weight 205 lbs  Neck 18 inches  Mallampati II  ESS 9  SAQLI 82    Interval History       Deni Mishra is a 60 y.o. old male who comes in to review the results of his recent sleep study, to answer questions and to explore options for treatment. Referred by Cardiology for abnormal heart rhythm found on Holter monitor (AVB 2:1). Had abnormal stress test leading to stenting. Used to snore, improved after T&A. Hx allergies on Flonase, Xyzal, HTN, GERD.     PMHx  Past Medical History:   Diagnosis Date    Calcium oxalate renal stones     he has undergone ECSW lithotripsy    Coronary artery disease involving native coronary artery of native heart without angina pectoris     Essential hypertension 2016    GERD (gastroesophageal reflux disease)     Hyperlipidemia 2003    hypercholsterolemia    Osteoarthritis     Ulcerative colitis ([address]      Past Surgical History:   Procedure Laterality Date    CARDIAC CATHETERIZATION      COLONOSCOPY      2010=surveillance for ulcerative collitis    FINGER TRIGGER RELEASE      KNEE ARTHROSCOPY      ROTATOR CUFF REPAIR      TONSILLECTOMY       Social History     Tobacco Use    Smoking status: Former Smoker     Packs/day: 1.00     Years: 10.00     Pack years: 10.00     Quit date: 3/29/1988     Years since quittin.8    Smokeless tobacco: Current User   Substance Use Topics    Alcohol use: Yes     Comment: 1-2 daily    Drug use: No     Family History   Problem Relation Age of Onset    Cancer Mother         NHL  Heart Disease Maternal Grandmother         CAD    Diabetes Maternal Grandfather     Heart Disease Paternal Grandfather         MI    Cancer Paternal Grandfather         PROSTATE     Allergies  No Known Allergies  Meds  Current Outpatient Medications   Medication Sig Dispense Refill    metoprolol tartrate (LOPRESSOR) 25 MG tablet Take 0.5 tablets by mouth 2 times daily 90 tablet 0    ticagrelor (BRILINTA) 90 MG TABS tablet Take 1 tablet by mouth 2 times daily 60 tablet 0    fluticasone (FLONASE) 50 MCG/ACT nasal spray 1 spray by Each Nostril route daily      nitroGLYCERIN (NITROSTAT) 0.4 MG SL tablet up to max of 3 total doses. If no relief after 1 dose, call 911. 25 tablet 3    levocetirizine (XYZAL) 5 MG tablet Take 1 tablet by mouth nightly 90 tablet 1    hydroCHLOROthiazide (HYDRODIURIL) 25 MG tablet Take 1 tablet by mouth daily 90 tablet 3    losartan (COZAAR) 50 MG tablet Take 1 tablet by mouth daily 90 tablet 3    atorvastatin (LIPITOR) 40 MG tablet Take 1 tablet by mouth daily 90 tablet 3    omeprazole (PRILOSEC) 20 MG delayed release capsule Take 1 capsule by mouth Daily 30 capsule 5    Acetaminophen (TYLENOL) 325 MG CAPS Take by mouth      multivitamin (THERAGRAN) per tablet Take 1 tablet by mouth daily.  aspirin 81 MG EC tablet Take 81 mg by mouth daily. No current facility-administered medications for this visit. ROS  Review of Systems    General/Constitutional: No recent loss of weight or appetite changes. No fever or chills. HENT: Negative. Eyes: Negative. Upper respiratory tract: No nasal stuffiness or post nasal drip. Lower respiratory tract/ lungs: No cough or sputum production. No hemoptysis. Cardiovascular: No palpitations or chest pain. Gastrointestinal: No nausea or vomiting. Neurological: No focal neurologiacal weakness. Extremities: No edema. Musculoskeletal: No complaints. Genitourinary: No complaints. Hematological: Negative. Psychiatric/Behavioral: Negative. Skin: No itching. Exam  Vitals -  /72 (Site: Right Upper Arm, Position: Sitting, Cuff Size: Large Adult)   Pulse 56   Temp 97.9 °F (36.6 °C) (Temporal)   Ht 5' 6\" (1.676 m)   Wt 203 lb 6.4 oz (92.3 kg)   SpO2 98% Comment: Room Air  BMI 32.83 kg/m²    Body mass index is 32.83 kg/m². Oxygen level - Room Air  Physical Exam   General Appearance - Moderately built, moderately nourished, in no acute distress. HEENT - Head is normocephalic, atraumatic. PERRL. Oral mucosa pink and moist, no oral thrush. Mallampati Score - II (soft palate, uvula, fauces visible). Neck - Supple, symmetrical, trachea midline and soft. Lungs - Clear to auscultation, no wheezes, rales or rhonchi, aeration good. Cardiovascular - Heart sounds are normal. Regular rhythm normal rate without murmur, gallop or rub. Abdomen - Soft, nontender, non-distended. Neurologic - Alert and oriented x 3. Skin - No bruising or bleeding. Extremities - No cyanosis, clubbing or edema. Assessment   Diagnosis Orders   1. MELVIN (obstructive sleep apnea)  Sleep Study with PAP Titration   2. Environmental and seasonal allergies     3. Coronary artery disease involving native coronary artery of native heart without angina pectoris     4. Benign essential HTN     5. Cardiac arrhythmia, unspecified cardiac arrhythmia type      AVB 2:1 conduction      Recommendations  I reviewed the sleep study results in detail with Luci Vaca. We discussed various treatment options including positional therapy, OAT and PAP therapies along with the benefits and limitations of each. We also discussed the health risks with untreated MELVIN. Due to the severity of his apnea and health history, PAP therapies is recommended. He agrees to proceed with PAP titration with mask fitting. NSR during study, no arrhythmias. Soft snoring. Very early sleep onset.   Continue Flonase, Xyzal. 30 minutes was spent on this visit with > 50% being face to face obtaining HPI, examining patient, reviewing test results, educating and coordinating a treatment plan. Follow up 8 weeks after PAP set up with download.        Electronically signed by SAI Hayes CNP on 1/14/2021 at 10:58 AM

## 2021-01-21 ENCOUNTER — OFFICE VISIT (OUTPATIENT)
Dept: CARDIOLOGY CLINIC | Age: 66
End: 2021-01-21
Payer: COMMERCIAL

## 2021-01-21 VITALS
HEIGHT: 66 IN | BODY MASS INDEX: 32.05 KG/M2 | HEART RATE: 68 BPM | SYSTOLIC BLOOD PRESSURE: 112 MMHG | DIASTOLIC BLOOD PRESSURE: 62 MMHG | WEIGHT: 199.4 LBS

## 2021-01-21 DIAGNOSIS — I25.83 CORONARY ARTERY DISEASE DUE TO LIPID RICH PLAQUE: Primary | ICD-10-CM

## 2021-01-21 DIAGNOSIS — I25.10 CORONARY ARTERY DISEASE DUE TO LIPID RICH PLAQUE: Primary | ICD-10-CM

## 2021-01-21 PROCEDURE — 99213 OFFICE O/P EST LOW 20 MIN: CPT | Performed by: INTERNAL MEDICINE

## 2021-01-21 NOTE — PROGRESS NOTES
[facility],[place] [address] [address] [place] [address]  Dept: [phone]  Dept Fax: [phone]  Loc: [phone]    Visit Date: 1/21/2021    Mr. Michi Noriega is a 60 y.o. male  who presented for:  Chief Complaint   Patient presents with    1 Month Follow-Up     dyspnea        HPI:   61 yo M c hx HTN, HLD, GERD and Ulcerative colitis is referred here for abnormal holter and stress test. Patient states he had some chest pain for which he underwent stress test.  The stress test was overall normal with mild TID and also having 2:1 conduction with Lexiscan injection. He underwent holter which showed 2:1 intermittent conduction. Has family hx of CAD with sister in 46s had heart attack. He used to smoke in the past.   He states his snoring has improved. Current Outpatient Medications:     metoprolol tartrate (LOPRESSOR) 25 MG tablet, Take 0.5 tablets by mouth 2 times daily, Disp: 90 tablet, Rfl: 0    ticagrelor (BRILINTA) 90 MG TABS tablet, Take 1 tablet by mouth 2 times daily, Disp: 60 tablet, Rfl: 0    fluticasone (FLONASE) 50 MCG/ACT nasal spray, 1 spray by Each Nostril route daily, Disp: , Rfl:     nitroGLYCERIN (NITROSTAT) 0.4 MG SL tablet, up to max of 3 total doses.  If no relief after 1 dose, call 911., Disp: 25 tablet, Rfl: 3    levocetirizine (XYZAL) 5 MG tablet, Take 1 tablet by mouth nightly, Disp: 90 tablet, Rfl: 1    hydroCHLOROthiazide (HYDRODIURIL) 25 MG tablet, Take 1 tablet by mouth daily, Disp: 90 tablet, Rfl: 3    losartan (COZAAR) 50 MG tablet, Take 1 tablet by mouth daily, Disp: 90 tablet, Rfl: 3    atorvastatin (LIPITOR) 40 MG tablet, Take 1 tablet by mouth daily, Disp: 90 tablet, Rfl: 3    omeprazole (PRILOSEC) 20 MG delayed release capsule, Take 1 capsule by mouth Daily, Disp: 30 capsule, Rfl: 5    Acetaminophen (TYLENOL) 325 MG CAPS, Take by mouth, Disp: , Rfl:   multivitamin (THERAGRAN) per tablet, Take 1 tablet by mouth daily. , Disp: , Rfl:     aspirin 81 MG EC tablet, Take 81 mg by mouth daily. , Disp: , Rfl:     Past Medical History  Pablo Laurent  has a past medical history of Calcium oxalate renal stones, Coronary artery disease involving native coronary artery of native heart without angina pectoris, Essential hypertension, GERD (gastroesophageal reflux disease), Hyperlipidemia, Osteoarthritis, and Ulcerative colitis ([facility] [address] Social History  Pablo Laurent  reports that he quit smoking about 32 years ago. He has a 10.00 pack-year smoking history. He uses smokeless tobacco. He reports current alcohol use. He reports that he does not use drugs. Family History  Pablo Laurent family history includes Cancer in his mother and paternal grandfather; Diabetes in his maternal grandfather; Heart Disease in his maternal grandmother and paternal grandfather. Past Surgical History   Past Surgical History:   Procedure Laterality Date    CARDIAC CATHETERIZATION      COLONOSCOPY      4/2010=surveillance for ulcerative collitis    FINGER TRIGGER RELEASE      KNEE ARTHROSCOPY      ROTATOR CUFF REPAIR      TONSILLECTOMY         Subjective:     REVIEW OF SYSTEMS  Constitutional: denies sweats, chills and fever  HENT: denies  congestion, sinus pressure, sneezing and sore throat. Eyes: denies  pain, discharge, redness and itching. Respiratory: denies apnea, cough  Gastrointestinal: denies blood in stool, constipation, diarrhea   Endocrine: denies cold intolerance, heat intolerance, polydipsia. Genitourinary: denies dysuria, enuresis, flank pain and hematuria. Musculoskeletal: denies arthralgias, joint swelling and neck pain. Neurological: denies numbness and headaches. Psychiatric/Behavioral: denies agitation, confusion, decreased concentration and dysphoric mood    All others reviewed and are negative.    Objective: /62   Pulse 68 Comment: irregular  Ht 5' 6\" (1.676 m)   Wt 199 lb 6.4 oz (90.4 kg)   BMI 32.18 kg/m²     Wt Readings from Last 3 Encounters:   01/21/21 199 lb 6.4 oz (90.4 kg)   01/14/21 203 lb 6.4 oz (92.3 kg)   12/28/20 205 lb (93 kg)     BP Readings from Last 3 Encounters:   01/21/21 112/62   01/14/21 112/72   12/28/20 110/74       PHYSICAL EXAM  Constitutional: Oriented to person, place, and time. Appears well-developed and well-nourished. HENT:   Head: Normocephalic and atraumatic. Eyes: EOM are normal. Pupils are equal, round, and reactive to light. Neck: Normal range of motion. Neck supple. No JVD present. Cardiovascular: Normal rate , normal heart sounds and intact distal pulses. Pulmonary/Chest: Effort normal and breath sounds normal. No respiratory distress. No wheezes. No rales. Abdominal: Soft. Bowel sounds are normal. No distension. There is no tenderness. Musculoskeletal: Normal range of motion. No edema. Neurological: Alert and oriented to person, place, and time. No cranial nerve deficit. Coordination normal.   Skin: Skin is warm and dry. Psychiatric: Normal mood and affect.        No results found for: CKTOTAL, CKMB, CKMBINDEX    Lab Results   Component Value Date    WBC 4.7 12/22/2020    RBC 4.32 12/22/2020    RBC 4.42 03/20/2012    HGB 13.9 12/22/2020    HCT 40.0 12/22/2020    MCV 92.6 12/22/2020    MCH 32.2 12/22/2020    MCHC 34.8 12/22/2020    RDW 12.3 03/29/2018     12/22/2020    MPV 8.5 12/22/2020       Lab Results   Component Value Date     12/22/2020    K 3.9 12/22/2020     12/22/2020    CO2 29 12/22/2020    BUN 18 12/22/2020    LABALBU 4.5 12/22/2020    LABALBU 4.4 03/20/2012    CREATININE 0.8 12/22/2020    CALCIUM 9.6 12/22/2020    LABGLOM >90 12/22/2020    GLUCOSE 104 12/22/2020    GLUCOSE 97 03/20/2012       Lab Results   Component Value Date    ALKPHOS 59 12/22/2020    ALT 24 12/22/2020    AST 25 12/22/2020    PROT 6.6 12/22/2020 BILITOT 0.7 12/22/2020    LABALBU 4.5 12/22/2020    LABALBU 4.4 03/20/2012       No results found for: MG    Lab Results   Component Value Date    INR 0.93 12/22/2020         No results found for: LABA1C    Lab Results   Component Value Date    TRIG 59 12/22/2020    HDL 48 12/22/2020    [facility] 81 12/22/2020       Lab Results   Component Value Date    TSH 2.460 05/20/2020         Testing Reviewed:      I haveindividually reviewed the below cardiac tests    EKG:    ECHO: No results found for this or any previous visit. STRESS:    CATH:    Assessment/Plan       Diagnosis Orders   1. Coronary artery disease due to lipid rich plaque       CAD s/p PCI  Former smoker  HTN  HLD  Ulcerative colitis     Doing well since PCI  Continue DAPT  Reviewed Echo  Continue Aspirin, statin, HCTZ/losartan  The patient is asked to make an attempt to improve diet and exercise patterns to aid in medical management of this problem. Advised more plant based nutrition/meditarrean diet   Advised patient to call office or seek immediate medical attention if there is any new onset of  any chest pain, sob, palpitations, lightheadedness, dizziness, orthopnea, PND or pedal edema. All medication side effects were discussed in details. Thank youfor allowing me to participate in the care of this patient. Please do not hesitate to contact me for any further questions. Return in about 6 months (around 7/21/2021), or if symptoms worsen or fail to improve, for Regular follow up, Review testing.        Electronically signed by Laurie Dave MD [facility]  1/21/2021 at 7:59 AM EST

## 2021-02-04 ENCOUNTER — HOSPITAL ENCOUNTER (OUTPATIENT)
Dept: CARDIAC REHAB | Age: 66
Setting detail: THERAPIES SERIES
Discharge: HOME | End: 2021-02-04
Payer: COMMERCIAL

## 2021-02-04 VITALS — HEIGHT: 66 IN | OXYGEN SATURATION: 99 % | WEIGHT: 195.8 LBS | BODY MASS INDEX: 31.47 KG/M2 | HEART RATE: 58 BPM

## 2021-02-04 PROCEDURE — G0422 INTENS CARDIAC REHAB W/EXERC: HCPCS

## 2021-02-04 PROCEDURE — G0423 INTENS CARDIAC REHAB NO EXER: HCPCS

## 2021-02-04 NOTE — PROGRESS NOTES
Video Education Report - ICR/CR  Name:  Iqra Russo     Date:  2/4/2021  MRN: [id number]     Session #:  1  Session Length: 40 min    Core Videos        []Heart Disease Risk Reduction       [x]Overview of Pritikin Eating Plan      []Move it          []Calorie Density         []Healthy Minds, Bodies, Hearts        []Label Reading - Part 1       []Metabolic Syndrome and Belly Fat        []How Our Thoughts Can Heal Our Hearts   []Dining Out - Part 1      []Biomechanical Limitations  []Facts on Fat        []Hypertension & Heart Disease    []Diseases of Our Time - Focusing on Diabetes   []Body Composition  []Nutrition Action Plan    []Exercise Action Plan    Exercise      Healthy Mind-Set  []Improving Performance    []Smoking Cessation    []Introduction to [address]  []Aging-Enhancing the Quality of Your Life  []Becoming a Pritikin   []Biology of Weight Control    []Cooking Breakfasts   []Decoding Lab Results    and Snacks  []Diseases of Our Time - Overview   []Cooking Dinner and   []Sleep Disorders     Sides  []Targeting Your Nutrition Priorities   []Healthy Salads &   Dressings  Nutrition      []Cooking Soups and   []Dining Out - Part 2     Desserts  []Fueling a Healthy Body   []Menu Workshop     Overview  []Planning Your Eating Strategy   []The Pritikin Solution  []Vitamins and Minerals    Comments:  Video completed,

## 2021-02-04 NOTE — PLAN OF CARE
[address] [place] [address] Facility-Based Program  Individualized Cardiac Treatment Plan    Patient Name:  Jenny Meek  :  1955  Age:  60 y.o. MRN:  [id number]  Diagnosis: PCI  Date of Event: 21   Physician:  José  Next Office Visit:    Date Entered Program: 21  Risk Stratifications: [x] Low [] Intermediate [] High  Allergies: No Known Allergies    COVID -19 Screen  Do you have any of the following symptoms:  [] Fever [] Cough [] SOB [] Muscle/Body Ache [] Loss of taste/smell [x] None    Have you traveled outside of the [place]? [] Yes      [x] No    Have you been around anyone who has tested Positive for COVID 19?  [] Yes      [x] No    The following Education has been completed with patient. [x] Wait in the lobby until we call you back to rehab. [x] You must wear a mask while in the medical center, and in cardiac rehab. Please bring your own. [x] Bring your own bottle of water with you. [x] You can bring a visitor with you, however, they will need to sit in the waiting room while you are in cardiac rehab.     Individual Cardiac Treatment Plan EXERCISE  INITIAL 30 DAY 60 DAY 90  DAY FINAL DAY   EXERCISE  ASSESSMENT/PLAN EXERCISE  REASSESSMENT EXERCISE   REASSESSMENT EXERCISE   REASSESSMENT EXERCISE   REASSESSMENT EXERCISE  DISCHARGE/FOLLOW-UP   Date: 21 Date: Date: Date: Date: Date:   Session #1 Session # ** Session # ** Session # ** Session # ** Session # **  Last session completed on **   Stages of Change Stages of Change Stages of Change Stages of Change Stages of Change Stages of Change   [] Pre Contemplation  [] Contemplation  [] Preparation  [x] Action  [] Maintenance  [] Relapse [] Pre Contemplation  [] Contemplation  [] Preparation  [] Action  [] Maintenance  [] Relapse [] Pre Contemplation  [] Contemplation  [] Preparation  [] Action  [] Maintenance  [] Relapse [] Pre Contemplation  [] Contemplation  [] Preparation [] Action  [] Maintenance  [] Relapse [] Pre Contemplation  [] Contemplation  [] Preparation  [] Action  [] Maintenance  [] Relapse [] Pre Contemplation  [] Contemplation  [] Preparation  [] Action  [] Maintenance  [] Relapse   EXERCISE ASSESSMENT EXERCISE ASSESSMENT EXERCISE ASSESSMENT EXERCISE ASSESSMENT EXERCISE ASSESSMENT EXERCISE ASSESSMENT   6 Min Walk Test  Distance walked:   0.26 miles  1372 ft.  3 METs  Max HR:88 BPM      RPE:  13    THR:  107-126  Rhythm:  NSR     6 Min Walk Test  Distance walked:   ** miles  ** ft  ** METs  Max HR:** BPM      RPE:  **  %Change ft= **    Rhythm:  **   DASI: 8.9 METs DASI: ** METs DASI: ** METs DASI: ** METs DASI: ** METs DASI: ** METs   Return to Work  [employer] on returning to work? [] Yes              [] No   [] Disabled     [x] Retired     Return to work:  Has Costella Dony returned to work? [] Yes    [] No    Return to work date set? [] Yes, **    [] No    Costella Dony is doing ** at work. Return to work:  Has Costella Dony returned to work? [] Yes    [] No    Return to work date set? [] Yes, **    [] No    Costella Dony is doing ** at work. Return to work:  Has Costella Dony returned to work? [] Yes    [] No    Return to work date set? [] Yes, **    [] No    Costella Dony is doing ** at work. Return to work:  Has Costella Dony returned to work? [] Yes    [] No    Return to work date set? [] Yes, **    [] No    Costella Dony is doing ** at work. Return to work:  Has Costella Dony returned to work? [] Yes    [] No    Return to work? [] Yes, **    [] No    *Required MET Level achieved for job duties? [] Yes    [] No   Orthopedic Limitations/  [] Yes    [x] No       Orthopedic Limitations  *If patient has orthopedic issue:   Actions/  accomodations needed to make Costella Dony successful : Orthopedic Limitations   Orthopedic Limitations   Orthopedic Limitations Orthopedic Limitations     Fall Risk  Fall risk assessed? [x] Yes      [] No    Balance Issues?   [] Yes      [x] No     [] Walker [] Asa Pall [x] Safety issues reviewed      Fall Risk  *If patient is a fall risk, action needed to accommodate:  Fall Risk Fall Risk Fall Risk Fall Risk   Home Exercise  [x] Yes    [] No  Type: bike, TM, elliptical  Frequency: 4 x per week  Duration: 60 min Home Exercise  [] Yes    [] No  Type: **  Frequency:**  Duration: ** Home Exercise  [] Yes    [] No  Type: **  Frequency: **  Duration: ** Home Exercise  [] Yes    [] No  Type: **  Frequency: **  Duration: ** Home Exercise  [] Yes    [] No  Type: **  Frequency: **  Duration: ** Home Exercise  [] Yes    [] No  Type: **  Frequency: **  Duration: **   Angina with Activity? [] Yes    [x] No  Angina Management: na Angina with Activity? [] Yes    [] No  Angina Management: ** Angina with Activity? [] Yes    [] No  Angina Management: ** Angina with Activity? [] Yes    [] No  Angina Management: ** Angina with Activity? [] Yes    [] No  Angina Management: ** Angina with Activity?   [] Yes    [] No  Angina Management: **   EXERCISE PLAN EXERCISE PLAN EXERCISE PLAN EXERCISE PLAN EXERCISE PLAN EXERCISE PLAN   *Interventions* *Interventions* *Interventions* *Interventions* *Interventions* *Interventions*   Exercise Prescription  (per physician & CR staff) Exercise Prescription  (per physician & CR staff) Exercise Prescription  (per physician & CR staff) Exercise Prescription  (per physician & CR staff) Exercise Prescription  (per physician & CR staff) Exercise Prescription  (per physician & CR staff)   Cardiovascular Cardiovascular Cardiovascular Cardiovascular Cardiovascular Cardiovascular   Mode:    [x] Treadmill (TM)  [x] Schwinn Airdyne (AD)  [x] Arms Ergometer (AE)  [] NuStep  [] Elliptical (E) MODE:    [] Treadmill (TM)  [] Schwinn Airdyne (AD)  [] Arms Ergometer (AE)  [] NuStep  [] Elliptical (E) MODE:    [] Treadmill (TM)  [] Schwinn Airdyne (AD)  [] Arms Ergometer (AE)  [] NuStep  [] Elliptical (E) MODE:    [] Treadmill (TM)  [] Schwinn Airdyne (AD)  [] Arms Ergometer (AE) [] NuStep  [] Elliptical (E) MODE:    [] Treadmill (TM)  [] Schwinn Airdyne (AD)  [] Arms Ergometer (AE)  [] NuStep  [] Elliptical (E) MODE:    [] Treadmill (TM)  [] Schwinn Airdyne (AD)  [] Arms Ergometer (AE)  [] NuStep  [] Elliptical (E)   Initial Workloads  TM: [email or web address] 3 METs  AD: 1.0 level = 3 METs    AE: 0.5 level = 2 METs Current Workloads  TM:  @ %=  METs  AD:  level =  METs  NS:   Wetzel=  METs  AE:  level =  METs Current Workloads  TM:  @ %=  METs  AD:  level =  METs  NS:   Wetzel=  METs  AE:  level =  METs Current Workloads  TM:  @ %=  METs  AD:  level =  METs  NS:   Wetzel=  METs  AE:  level =  METs Current Workloads  TM:  @ %=  METs  AD:  level =  METs  NS:   Wetzel=  METs  AE:  level =  METs Current Workloads  TM:  @ %=  METs  AD:  level =  METs  NS:   Wetzel=  METs  AE:  level =  METs     Frequency:    ICR: 3x/week  Home: 2-3x/wk Frequency:   ICR: 3x/week  Home: 3x/wk Frequency:  ICR: 3x/week  Home: 3-4x/wk Frequency:  ICR: 3x/week  Home: 3-4x/wk Frequency:  ICR: 3x/week  Home: 3-4x/wk Frequency:  Mart Small will continue exercise at  5-7 days/week   Duration:   Total aerobic exercise = 30-45 min    5-8 min/mode Duration:  Total aerobic exercises = ** min     **min/mode Duration:  Total aerobic exercises = ** min     **min/mode Duration:  Total aerobic exercises = ** min     **min/mode Duration:  Total aerobic exercises = ** min     **min/mode Duration:  Total erobic exercise =  60-90 min   Intensity:   MET Level = 3  RPE = 12-15 Intensity:  Max MET Level = **  RPE = 12-15 Intensity:  Max MET Level = **  RPE = 12-15 Intensity:  Max MET Level = **  RPE = 12-15 Intensity:  Max MET Level = **  RPE = 12-15 Intensity:  Max MET Level = ** RPE = 12-15   Progression: increase aerobic activity up to 15 min over next 4 weeks by increasing time 2-3 min/week.  Progression:   Progression:   Progression: Progression: Progression:  Increase time/intensity when RPE <13, and HR is in THRR Soha Whelan verbalizes planning to ** ** days/week for ** min on days not in rehab. Home Exercise  *Freddy verbalizes his/her plan to ** ** days/week for ** min @ **   *Education* *Education* *Education* *Education* *Education* *Education*   RPE Scale  [x] Yes      [] No  Exercise Safety  [x] Yes      [] No  Equipment Orientation  [x] Yes      [] No  S/S to Report  [x] Yes      [] No  Warm Up/Cool Down  [x] Yes      [] No  Home Exercise  [x] Yes      [] No     All Exercise Education Completed  [] Yes      [] No   Exercise Education Recommended    Workshops  [x] Improving Performance  [x] Balance Training & Fall Prevention  [x] Intro to Yoga - Video  [x] Resistance Training & Core Strength Exercise Education Attended/Date Exercise Education Attended/Date Exercise Education Attended/Date Exercise Education Attended/Date All Sessions Completed? [] Yes  [] No   *Goals* *Goals* *Goals* *Goals* *Goals* *Goals*   Initial Exercise Goals Exercise Goals  Exercise Goals   Exercise Goals  Exercise Goals  Exercise Goals   Freddy plans to:  [x] Attend exercise sessions 3x/wk  [x] initiate home exercise 2-3x/wk for 10-20 min  [x] Increase 6 min walk distance by 10%  [x] Attend Exercise workshops Elaina Betts plans to:  [x] Attend exercise sessions 3x/wk  [x] continue home exercise 2-3x/wk for 20-30 min  [x] Attend Exercise workshops Freddy's plans to:  [x] Attend exercise sessions 3x/wk  [x] continue home exercise 3-4x/wk for 30-45 min  [x] Determine plan of exercise following rehab  [x] Attend Exercise workshops Freddy's plans to:  [x] Attend exercise sessions 3x/wk  [x] continue home exercise 3-4x/wk for 30-45 min  [x] Determine plan of exercise following rehab  [x] Attend Exercise workshops Freddy's plans to:  [x] Attend exercise sessions 3x/wk  [x] continue home exercise 3-4x/wk for 30-45 min  [x] Determine plan of exercise following rehab  [x] Attend Exercise workshops Elaina Betts achieved exercise goals?     []  Yes    [] No If no, why?  **  [] Increased 6 min walk distance by 10%  [] Currently exercising 30-60 min/day, 5-7days/wk   [] Plans to continue exercise on own  [] Plans to join a local fitness center to continue exercise  [] Does not plan to continue to exercise after rehab   Return to ADL or Hobbies:  Carol David would like to improve strength and endurance so he is able to return to hunt, cross bow, shot gun, household chores Return to ADL or Hobbies:  Carol David would like to improve strength and endurance so he/she is able to return to ** Return to ADL or Hobbies:  Carol David would like to improve strength and endurance so he/she is able to return to ** Return to ADL or Hobbies:  Carol David would like to improve strength and endurance so he/she is able to return to ** Return to ADL or Hobbies:  Carol David would like to improve strength and endurance so he/she is able to return to ** Return to ADL or Hobbies:  Carol David would like to improve strength and endurance so he/she is able to return to **    *MET level required for above goal:  6-7 METs MET level Achieved:  **METs MET level Achieved:  **METs MET level Achieved:  **METs MET level Achieved:  **METs MET level Achieved:  **METs     Individual Cardiac Treatment Plan  Nutrition  NUTRITION  ASSESSMENT/PLAN NUTRITION  REASSESSMENT NUTRITION   REASSESSMENT NUTRITION   REASSESSMENT NUTRITION  DISCHARGE/FOLLOW-UP NUTRITION  DISCHARGE/FOLLOW-UP   Stages of Change Stages of Change Stages of Change Stages of Change Stages of Change Stages of Change   [x] Pre Contemplation  [] Contemplation  [] Preparation  [] Action  [] Maintenance  [] Relapse [] Pre Contemplation  [] Contemplation  [] Preparation  [] Action  [] Maintenance  [] Relapse [] Pre Contemplation  [] Contemplation  [] Preparation  [] Action  [] Maintenance  [] Relapse [] Pre Contemplation  [] Contemplation  [] Preparation  [] Action  [] Maintenance  [] Relapse [] Pre Contemplation  [] Contemplation  [] Preparation  [] Action [] Maintenance  [] Relapse [] Pre Contemplation  [] Contemplation  [] Preparation  [] Action  [] Maintenance  [] Relapse   NUTRITION ASSESSMENT NUTRITION ASSESSMENT NUTRITION ASSESSMENT NUTRITION ASSESSMENT NUTRITION ASSESSMENT NUTRITION ASSESSMENT   Weight Management  Weight: 195.8        Height: 66\"   BMI: 31.7  Weight Management  Weight: **                  Weight Management  Weight: **                  Weight Management  Weight: ** Weight Management  Weight: ** Weight Management  Weight: **                    BMI: **   Eating Plan  Current eating habits: nothing in particular, portion control Eating Plan  Changes: Eating Plan  Changes: Eating Plan  Changes: Eating Plan  Changes: Eating Plan Improvements:   Alcohol Use  [] none          [] daily  [x] weekly      [] special   Type: beer, wiskey  Amount: 4+        Diet Assessment Tool:  RATE YOUR PLATE  *Given to patient to complete and return. Diet Assessment Tool:    Score: **/69        Diet Assessment Tool: RATE YOUR PLATE  Score: **/66   NUTRITION PLAN NUTRITION PLAN NUTRITION PLAN NUTRITION PLAN NUTRITION PLAN NUTRITION PLAN   *Interventions* *Interventions* *Interventions* *Interventions* *Interventions* *Interventions*   Initial Survey given Goal Setting Discussion:   [] Yes      [] No        Follow Up Survey Reviewed & Goals Updated:     Professional Referral  Please check if needed. [] Dietitian Consult   [] Wt. Management Referral  [] Other:  Professional Referral  Please check if needed. [] Dietitian Consult   [] Wt. Management Referral  [] Other: Professional Referral  Please check if needed. [] Dietitian Consult   [] Wt. Management Referral  [] Other: Professional Referral  Please check if needed. [] Dietitian Consult   [] Wt. Management Referral  [] Other: Professional Referral  Please check if needed. [] Dietitian Consult   [] Wt. Management Referral  [] Other: Professional Referral  Please check if needed.   [] Dietitian Consult [] Wt. Management Referral  [] Other:   *Education* *Education* *Education* *Education* *Education* *Education*   Nutritional Education Recommended    [x] 1:1 Registered Dietitian    Workshops  [x] Label Reading   [x] Menu  [x] Targeting Nutrition Priorities  [x] Fueling a Healthy Body   Nutritional Education Attended/Date Nutritional Education Attended/Date Nutritional Education Attended/Date Nutritional Education Attended/Date All Sessions Completed? [] Yes  [] No   Cooking School  Recommended     [x] Adding Flavor  [x] Fast & Healthy     Breakfasts  [x] Salads & Dressings  [x] Savory Soups  [x] Simple Sides & Sauces  [x] Appetizers &     Snacks  [x] Delicious Desserts  [x] Plant-Based Proteins  [x] Fast Evening Meals  [x] Weekend Breakfasts  [x] Efficiency Cooking  [x] One-Pot [employer]  Sessions Completed   [facility]  Sessions Completed [facility]  Sessions Completed     [facility]  Sessions Completed [facility]    # of sessions completed:  **   *Goals* *Goals* *Goals* *Goals* *Goals* *Goals*   Freddy's nutritional goals are as follows:  Complete and return diet survey Freddy's nutritional goals are as follows:  [] Attend Nutrition Workshops  [] Attend 1:1   [] Attend Cooking Classes  [] ** Freddy's nutritional goals are as follows:  [] Attend Nutrition Workshops  [] Attend 1:1   [] Attend Cooking Classes  [] Complete and return diet survey  [] ** Freddy's nutritional goals are as follows:  [] Attend Nutrition Workshops  [] Attend 1:1   [] Attend Cooking Classes  [] ** Freddy's nutritional goals are as follows:  [] Attend Nutrition Workshops  [] Attend 1:1   [] Attend Cooking Classes  [] ** Freddy achieved nutritional goals   [] Yes    [] No  If no, why?   Use knowledge gained to continue Pritikin eating plan at home       Individual Cardiac Treatment Plan  Psychosocial  PSYCHOSOCIAL  ASSESSMENT/PLAN PSYCHOSOCIAL  REASSESSMENT PSYCHOSOCIAL   REASSESSMENT PSYCHOSOCIAL REASSESSMENT PSYCHOSOCIAL  DISCHARGE/FOLLOW-UP PSYCHOSOCIAL  DISCHARGE/FOLLOW-UP   Stages of Change Stages of Change Stages of Change Stages of Change Stages of Change Stages of Change   [] Pre Contemplation  [x] Contemplation  [] Preparation  [] Action  [] Maintenance  [] Relapse [] Pre Contemplation  [] Contemplation  [] Preparation  [] Action  [] Maintenance  [] Relapse [] Pre Contemplation  [] Contemplation  [] Preparation  [] Action  [] Maintenance  [] Relapse [] Pre Contemplation  [] Contemplation  [] Preparation  [] Action  [] Maintenance  [] Relapse [] Pre Contemplation  [] Contemplation  [] Preparation  [] Action  [] Maintenance  [] Relapse [] Pre Contemplation  [] Contemplation  [] Preparation  [] Action  [] Maintenance  [] Relapse   PSYCHOSOCIAL ASSESSMENT PSYCHOSOCIAL ASSESSMENT PSYCHOSOCIAL ASSESSMENT PSYCHOSOCIAL ASSESSMENT PSYCHOSOCIAL ASSESSMENT PSYCHOSOCIAL ASSESSMENT   Behavioral Outcomes Behavioral Outcomes Behavioral Outcomes Behavioral Outcomes Behavioral Outcomes Behavioral Outcomes   Tool Used:  Cynthia & Salvador, Quality of Life Index, Cardiac Version IV  *Given to patient to complete. Tool Used:    Cynthia & Salvador, Quality of Life Index, Cardiac Version IV     QOL Index Score: **  HF:**  S&E:**  P&S: **  Family: **   Tool Used:     Cynthia & Franco, Quality of Life Index, Cardiac Version IV    QOL Index Score: **  HF:**  S&E:**  P&S: **  Family: ** Tool Used:     Cynthia & Franco, Quality of Life Index, Cardiac Version IV    QOL Index Score: **  HF:**  S&E:**  P&S: **  Family: **   PHQ-9 score 2  Depression Severity  [x]Minimal  []Mild   []Moderate  []Moderately Severe  []Severe    PHQ-9 score **  Depression Severity  []Minimal  []Mild   []Moderate  []Moderately Severe []Severe PHQ-9 score **  Depression Severity  []Minimal  []Mild   []Moderate  []Moderately Severe []Severe   Does patient have Family Support?   [x] Yes      [] No  No signs of marital/family distress Within the Past Month:  *Have you wished you were dead or wished you could go to sleep and not wake up? [] Yes      [x] No  *Have you had any thoughts of killing yourself? [] Yes      [x] No          Using a scale of 0-10, 0=none, 10=very:   Rate your depression: 0  Rate your anxiety:  2  Using a scale of 0-10, 0=none, 10=very:   Rate your depression: **  Rate your anxiety:  ** Using a scale of 0-10, 0=none, 10=very:   Rate your depression: **  Rate your anxiety:  ** Using a scale of 0-10, 0=none, 10=very:   Rate your depression: **  Rate your anxiety:  ** Using a scale of 0-10, 0=none, 10=very:   Rate your depression: **  Rate your anxiety:  ** Using a scale of 0-10, 0=none, 10=very:   Rate your depression: **  Rate your anxiety:  **   Signs and Symptoms of Depression Present? [] Yes      [x] No   Signs and Symptoms of Depression Present? [] Yes      [] No  If yes, please explain:  ** Signs and Symptoms of Depression Present? [] Yes      [] No  If yes, please explain:  ** Signs and Symptoms of Depression Present? [] Yes      [] No  If yes, please explain:  ** Signs and Symptoms of Depression Present? [] Yes      [] No  If yes, please explain:  ** Signs and Symptoms of Depression Present? [] Yes      [] No  If yes, please explain:  **   Signs and Symptoms of Anxiety Present? [] Yes      [x] No   Signs and Symptoms of Anxiety Present? [] Yes      [] No  If yes, please explain:  ** Signs and Symptoms of Anxiety Present? [] Yes      [] No  If yes, please explain:  ** Signs and Symptoms of Anxiety Present? [] Yes      [] No  If yes, please explain:  ** Signs and Symptoms of Anxiety Present? [] Yes      [] No  If yes, please explain:  ** Signs and Symptoms of Anxiety Present? [] Yes      [] No  If yes, please explain:  **   [] Patient has poor eye contact   [] Flat affect present. [] Signs of anxiety, anger or hostility    [] Signs social isolation present. []  Signs of alcohol or substance abuse        PSYCHOSOCIAL PLAN PSYCHOSOCIAL PLAN PSYCHOSOCIAL PLAN PSYCHOSOCIAL PLAN PSYCHOSOCIAL PLAN PSYCHOSOCIAL PLAN   *Interventions* *Interventions* *Interventions* *Interventions* *Interventions* *Interventions*   *Please check if needed  [] Psych Consult  [] Physician Referral  [x] Stress Management Skills *Please check if needed  [] Psych Consult  [] Physician Referral  [] Stress Management Skills *Please check if needed  [] Psych Consult  [] Physician Referral  [] Stress Management Skills *Please check if needed  [] Psych Consult  [] Physician Referral  [] Stress Management Skills *Please check if needed  [] Psych Consult  [] Physician Referral  [] Stress Management Skills *Please check if needed  [] Psych Consult  [] Physician Referral  [] Stress Management Skills   Is patient currently taking anti-depressant or anti-anxiety medications? [] Yes      [x] No   Change in anti-depressant or anti-anxiety medications? [] Yes      [] No  If yes, please list medications:  ** Change in anti-depressant or anti-anxiety medications? [] Yes      [] No  If yes, please list medications:  ** Change in anti-depressant or anti-anxiety medications? [] Yes      [] No  If yes, please list medications:  ** Change in anti-depressant or anti-anxiety medications? [] Yes      [] No  If yes, please list medications:  ** Change in anti-depressant or anti-anxiety medications?   [] Yes      [] No  If yes, please list medications:  **   *Education* *Education* *Education* *Education* *Education* *Education*   Healthy Mind-Set Workshops Recommended  [x] Stress & Health  [x] Taking Charge of Stress  [x] New Thoughts, New Behaviors  [x] Managing Moods & Relationships Healthy Mind-Set Workshops Attended/Date Healthy Mind-Set Workshops Attended/Date Healthy Mind-Set Workshops Attended/Date Healthy Mind-Set Workshops  Completed  [] Yes      [] No Healthy Mind-Set Workshops  Completed  [] Yes      [] No *Goals* *Goals* *Goals* *Goals* *Goals* *Goals*   Freddy's psychosocial goals are as follows:  Complete HADS & Cynthia & Salvador, Quality of Life Index, Cardiac Version IV Hi psychosocial goals are as follows:  [] Attend Healthy Mind-Set Workshops  [] Reduce depression symptom severity by 1 level  [] ** Freddy's psychosocial goals are as follows:  [] Attend Healthy Mind-Set Workshops  [] Reduce depression symptom severity by 1 level  [] ** Freddy's psychosocial goals are as follows:  [] Attend Healthy Mind-Set Workshops  [] Reduce depression symptom severity by 1 level  [] Michelle Fluke achieved psychosocial goals? [] Yes    [] No  If no, why?  **  [] Use methods learned to continue to reduce depression symptom severity by 1 level  [] Michelle Fluke achieved psychosocial goals?   [] Yes    [] No  If no, why?  **  [] Use methods learned to continue to reduce depression symptom severity by 1 level  [] **     Individual Cardiac Treatment Plan  Other:  Risk Factor/Education  RISK FACTOR  ASSESSMENT/PLAN RISK FACTOR  REASSESSMENT  RISK FACTOR  REASSESSMENT RISK FACTOR  REASSESSMENT RISK FACTOR   DISCHARGE/FOLLOW-UP RISK FACTOR   DISCHARGE/FOLLOW-UP   Stages of Change Stages of Change Stages of Change Stages of Change Stages of Change Stages of Change   [] Pre Contemplation  [] Contemplation  [x] Preparation  [] Action  [] Maintenance  [] Relapse [] Pre Contemplation  [] Contemplation  [] Preparation  [] Action  [] Maintenance  [] Relapse [] Pre Contemplation  [] Contemplation  [] Preparation  [] Action  [] Maintenance  [] Relapse [] Pre Contemplation  [] Contemplation  [] Preparation  [] Action  [] Maintenance  [] Relapse [] Pre Contemplation  [] Contemplation  [] Preparation  [] Action  [] Maintenance  [] Relapse [] Pre Contemplation  [] Contemplation  [] Preparation  [] Action  [] Maintenance  [] Relapse RISK FACTOR/EDUCATION ASSESSMENT RISK FACTOR/EDUCATION ASSESSMENT RISK FACTOR/EDUCATION ASSESSMENT RISK FACTOR/EDUCATION ASSESSMENT RISK FACTOR /EDUCATION ASSESSMENT RISK FACTOR /EDUCATION ASSESSMENT   Hypertension  [x] Yes      [] No    Resting BP: 114/68  Peak Ex BP:142/78  Medication: losartan, metoprolol   Hypertension  Resting BP: **  Peak Ex BP:**  Medication Changes:  [] Yes      [] No Hypertension  Resting BP: **  Peak Ex BP:**  Medication Changes:  [] Yes      [] No Hypertension  Resting BP: **  Peak Ex BP:**  Medication Changes:  [] Yes      [] No Hypertension  Resting BP: **  Peak Ex BP:**  Medication Changes:  [] Yes      [] No Hypertension  Resting BP: **  Peak Ex BP:**  Medication Changes:  [] Yes      [] No   Lipids  HLD/DLD  [x] Yes      [] No  TOTAL CHOL: 141  HDL:  48  LDL:  81  TRI  Medication: atorvastatin Lipids  Medication Changes:  [] Yes      [] No     Lipids  Medication Changes:  [] Yes      [] No     Lipids  Medication Changes:  [] Yes      [] No     Lipids    TOTAL CHOL: **  HDL:  **  LDL:  **  TRIG:  **  Medication Changes:  [] Yes      [] No Lipids    TOTAL CHOL: **  HDL:  **  LDL:  **  TRIG:  **  Medication Changes:  [] Yes      [] No   Diabetes  [] Yes      [x] No  FBS: 104            Diabetes  Most Recent BS:  BS have been in range  [] Yes      [] No  Medication Changes  [] Yes      [] No   Diabetes  Most Recent BS:  BS have been in range  [] Yes      [] No  Medication Changes  [] Yes      [] No     Diabetes  Most Recent BS:  BS have been in range  [] Yes      [] No  Medication Changes  [] Yes      [] No     Diabetes  Most Recent BS:  BS have been in range  [] Yes      [] No  Medication Changes  [] Yes      [] No Diabetes  Most Recent BS:  BS have been in range  [] Yes      [] No  Medication Changes  [] Yes      [] No       Tobacco Use  [] Current  [x] Former  [] Never    Years smoked: 48:    Date Quit:     # cigarettes smoked/day: 1-2.5 pks/day    Smokeless Tobacco use: [x] Yes      [] No  Quit 2018 Tobacco Use  Change in smoking status   [] Yes      [] No    Quit date: **   Tobacco Use  Change in smoking status   [] Yes      [] No    Quit date: **   Tobacco Use  Change in smoking status   [] Yes      [] No    Quit date: ** Tobacco Use  Change in smoking status   [] Yes      [] No    Quit date: ** Tobacco Use  Change in smoking status   [] Yes      [] No    Quit date: **             Learning Barriers  Please select one:  [] Speech  [] Literacy  [x] Hearing  [] Cognitive  [] Vision  [x] Ready to Learn Learning Barriers Addressed:   [] Yes      [] No   Learning Barriers Addressed:   [] Yes      [] No   Learning Barriers Addressed:  [] Yes      [] No Learning Barriers Addressed:  [] Yes      [] No Learning Barriers Addressed:  [] Yes      [] No     RISK FACTOR/EDUCATION PLAN RISK FACTOR/EDUCATION PLAN RISK FACTOR/EDUCATION PLAN RISK FACTOR/EDUCATION PLAN RISK FACTOR/EDUCATION PLAN RISK FACTOR/EDUCATION PLAN   *Interventions* *Interventions* *Interventions* *Interventions* *Interventions* *Interventions*   Recommended Educational Videos    [x] Overview of The Pritikin Eating Plan  [x] Heart Disease Risk Reduction  [x] Move It! [x] Calorie Density  [x] Healthy Minds, Bodies, Hearts  [x] Label Reading  [x] Metabolic Syndrome & Belly   Or  [] How Our Thoughts Can Heal our Heart  [x] Dining Out-Part 1  [x] Biomechanical Limitations  [x] Facts on Fat  [x] Hypertension & Heart Disease  [x] Diseases of Our Times-Focusing on Diabetes  [x] Body Composition  [x] Nurtition Action Plan  [x] Exercise Action Plan   Completed Videos/Date      2/4/21  Overview of The Pritikin Eating Plan Completed Videos/Date Completed Videos/Date Completed Videos/Date Recommended Educational Videos Completed    [] Yes      [] No    **If not completed, Why? **           Smoking Cessation/Relaspe Prevention Intervention needed? [] Yes      [x] No   Smoking Cessation/Relapse Prevention Scheduled?    [] Yes      [] No Date:  ** Smoking Cessation/Relapse Prevention completed? [] Yes      [] No  Date: ** Smoking Cessation/Relapse Prevention completed? [] Yes      [] No  Date: ** Smoking Cessation/Relapse Prevention completed? [] Yes      [] No  Date: ** Smoking Cessation Counseling attended  [] Yes      [] No  **If not completed, Why? **   Professional Referrals:  *Please check if needed  [] Diabetes Clinic  [] Lipid Clinic   [] Other:      Professional Referrals:  *Please check if needed  [] Diabetes Clinic  [] Lipid Clinic   [] Other:   Preventative Medication Preventative Medication Preventative Medication Preventative Medication Preventative Medication Preventative Medication   Aspirin  [x] Yes    [] No  Blood Thinner: Clopidogrel/Effient/Brillinta  [x] Yes    [] No  Beta Blocker  [x] Yes    [] No  Ace Inhibitor  [] Yes    [x] No  Statin/Lipid Lowering  [x] Yes    [] No Medication Changes? [] Yes    [] No Medication Changes? [] Yes    [] No Medication Changes? [] Yes    [] No Medication Changes? [] Yes    [] No Medication Changes? [] Yes    [] No   *Education* *Education* *Education* *Education* *Education* *Education*   Does [place] require any additional education? [] Yes    [x] No   Does [place] require any additional education? [] Yes    [] No Does [place] require any additional education? [] Yes    [] No Does [place] require any additional education? [] Yes    [] No Does [place] require any additional education? [] Yes    [] No Does [place] require any additional education?   [] Yes    [] No   Additional Educational Videos    Exercise  [] Improve Performance    Medical  [] Aging Enhancing Your QoL  [] Biology of Weight Control  [] Decoding Lab Results  [] Diseases of Our Time - Overview  [] Sleep Disorders    Nutrition  [] Dining Out - Part 2  [] Fueling a Healthy Body  [] Menu Workshop  [] Planning Your Eating Strategy  [] Targeting Your Nutrition Priorities  [] Vitamins & Minerals    Healthy Mind-Set [] Smoking Cessation    Culinary  []Becoming a Pritikin    [] Cooking - Breakfast & Snacks  [] Cooking -Healhty Salads & Dressing  [] Cooking -Dinner & Sides  [] Cooking -Soups & Desserts    Overview  [] The Pritikin Solution Additional Educational Videos Completed Additional Educational Videos Completed Additional Educational Videos Completed Additional Educational Videos Completed Additional Educational Videos Completed    [] Yes    [] No   *Goals* *Goals* *Goals* *Goals* *Goals* *Goals*   Freddy's risk factor/education goals are as follows:    [x] Optimal BP <140/90  [] Blood Sugar <120  [x] Attend recommended video education sessions  [x] Takes medications as prescribed 100% of the time   [] ** Freddy's risk factor/education goals are as follows:    [x] Optimal BP <140/90  [] Blood Sugar <120  [x] Attend recommended video education sessions  [x] Takes medications as prescribed 100% of the time   [] ** Freddy's risk factor/education goals are as follows:    [x] Optimal BP <140/90  [] Blood Sugar <120  [x] Attend recommended video education sessions  [x] Takes medications as prescribed 100% of the time   [] ** Freddy's risk factor/education goals are as follows:    [x] Optimal BP <140/90  [] Blood Sugar <120  [x] Attend recommended video education sessions  [x] Takes medications as prescribed 100% of the time   [] ** Freddy's risk factor/education goals are as follows:    [x] Optimal BP <140/90  [] Blood Sugar <120  [x] Attend recommended video education sessions  [x] Takes medications as prescribed 100% of the time   [] Arnie Kelsey achieved risk factor goals?   [] Yes    [] No  If no, why?  **     Monitored telemetry has revealed NSR   Monitored telemetry has revealed **  [] documented arrhythmia at increasing workloads  [] associated symptoms ** Monitored telemetry has revealed  [] documented arrhythmia at increasing workloads  [] associated symptoms ** Monitored telemetry has revealed ** [] documented arrhythmia at increasing workloads  [] associated symptoms ** Monitored telemetry has revealed **  [] documented arrhythmia at increasing workloads  [] associated symptoms ** Monitored telemetry has revealed **  [] documented arrhythmia at increasing workloads  [] associated symptoms **   Physician Response    [x] Cardiac rehab is reasonably and medically necessary for continuous cardiac monitoring surveillance  of patient's cardiac activity  [x] Initiate continuous telemetry monitoring and notify me with any concerns  [] Other   Physician Response    [x] Cardiac rehab is reasonably and medically necessary for continuous cardiac monitoring surveillance  of patient's cardiac activity  [x] Continue continuous telemetry monitoring and notify me with any concerns  [] Other     Physician Response    [x] Cardiac rehab is reasonably and medically necessary for continuous cardiac monitoring surveillance  of patient's cardiac activity  [x] Continue continuous telemetry monitoring and notify me with any concerns   [] Other     Physician Response    [x] Cardiac rehab is reasonably and medically necessary for continuous cardiac monitoring surveillance  of patient's cardiac activity  [x] Continue continuous telemetry monitoring and notify me with any concerns   [] Other     Physician Response    [x] Cardiac rehab is reasonably and medically necessary for continuous cardiac monitoring surveillance  of patient's cardiac activity  [x] Continue continuous telemetry monitoring and notify me with any concerns   [] Other

## 2021-02-08 ENCOUNTER — HOSPITAL ENCOUNTER (OUTPATIENT)
Dept: CARDIAC REHAB | Age: 66
Setting detail: THERAPIES SERIES
Discharge: HOME | End: 2021-02-08
Payer: COMMERCIAL

## 2021-02-08 ENCOUNTER — TELEPHONE (OUTPATIENT)
Dept: CARDIOLOGY CLINIC | Age: 66
End: 2021-02-08

## 2021-02-08 DIAGNOSIS — I49.9 IRREGULARLY IRREGULAR HEART RHYTHM: Primary | ICD-10-CM

## 2021-02-08 PROCEDURE — G0422 INTENS CARDIAC REHAB W/EXERC: HCPCS

## 2021-02-08 PROCEDURE — G0423 INTENS CARDIAC REHAB NO EXER: HCPCS

## 2021-02-09 NOTE — TELEPHONE ENCOUNTER
Please place a 14 day event monitor and a follow, he has hx of 2:1 conduction.    This rhythm looks Sinus with PACs vs ?AFL

## 2021-02-10 ENCOUNTER — HOSPITAL ENCOUNTER (OUTPATIENT)
Dept: CARDIAC REHAB | Age: 66
Setting detail: THERAPIES SERIES
Discharge: HOME | End: 2021-02-10
Payer: COMMERCIAL

## 2021-02-10 PROCEDURE — G0422 INTENS CARDIAC REHAB W/EXERC: HCPCS

## 2021-02-10 PROCEDURE — G0423 INTENS CARDIAC REHAB NO EXER: HCPCS

## 2021-02-10 RX ORDER — FLUTICASONE PROPIONATE 50 MCG
SPRAY, SUSPENSION (ML) NASAL
Qty: 1 BOTTLE | Refills: 3 | Status: SHIPPED | OUTPATIENT
Start: 2021-02-10 | End: 2021-08-04

## 2021-02-11 ENCOUNTER — HOSPITAL ENCOUNTER (OUTPATIENT)
Dept: NON INVASIVE DIAGNOSTICS | Age: 66
Discharge: HOME | End: 2021-02-11
Payer: COMMERCIAL

## 2021-02-11 DIAGNOSIS — I49.9 IRREGULARLY IRREGULAR HEART RHYTHM: ICD-10-CM

## 2021-02-11 PROCEDURE — 93246 EXT ECG>7D<15D RECORDING: CPT

## 2021-02-12 ENCOUNTER — HOSPITAL ENCOUNTER (OUTPATIENT)
Dept: CARDIAC REHAB | Age: 66
Setting detail: THERAPIES SERIES
Discharge: HOME | End: 2021-02-12
Payer: COMMERCIAL

## 2021-02-12 ENCOUNTER — HOSPITAL ENCOUNTER (OUTPATIENT)
Dept: SLEEP CENTER | Age: 66
Discharge: HOME | End: 2021-02-14
Payer: COMMERCIAL

## 2021-02-12 DIAGNOSIS — G47.33 OSA (OBSTRUCTIVE SLEEP APNEA): ICD-10-CM

## 2021-02-12 PROCEDURE — G0422 INTENS CARDIAC REHAB W/EXERC: HCPCS

## 2021-02-12 PROCEDURE — G0423 INTENS CARDIAC REHAB NO EXER: HCPCS

## 2021-02-12 PROCEDURE — 95811 POLYSOM 6/>YRS CPAP 4/> PARM: CPT

## 2021-02-12 NOTE — PROGRESS NOTES
Video Education Report - ICR/CR  Name:  Hussein Lyons     Date:  2/12/2021  MRN: [id number]     Session #:  4  Session Length: 40 min    Core Videos        []Heart Disease Risk Reduction       []Overview of Pritikin Eating Plan      []Move it          []Calorie Density         []Healthy Minds, Bodies, Hearts        []Label Reading - Part 1       []Metabolic Syndrome and Belly Fat        []How Our Thoughts Can Heal Our Hearts   []Dining Out - Part 1      []Biomechanical Limitations      Exercise      Healthy Mind-Set  []Improving Performance    []Smoking Cessation    []Introduction to [address]  []Aging-Enhancing the Quality of Your Life  []Becoming a Pritikin   []Biology of Weight Control    []Cooking Breakfasts   []Decoding Lab Results    and Snacks  []Diseases of Our Time - Overview   []Cooking Dinner and   []Sleep Disorders     Sides  []Targeting Your Nutrition Priorities   []Healthy Salads &   Dressings  Nutrition      []Cooking Soups and   []Dining Out - Part 2     Desserts  []Fueling a Healthy Body   []Menu Workshop     Overview  []Planning Your Eating Strategy   []The Pritikin Solution  [x]Vitamins and Minerals    Comments:  Video completed, group discussion

## 2021-02-12 NOTE — PLAN OF CARE
[facility]ospital Facility-Based Program  Phase 2 Cardiac Rehab Weekly Progress Report      Patient prescribed exercise:  2:15 class. 3 times per week in rehab, 1-4 times per week at home for the amount of sessions/weeks specified by insurance. Current Levels: Treadmill: 2. 6mph/0% for 10 minutes, Schwinn Airdyne: Level 1.0 for 10 minutes,  UBE: Level 0.5 for 5 minutes. Progression Discussion: Increase Aerobic exercise 12 minutes to work on endurance. Attempt to increase intensity by 5-20% for each modality this week. Try to increase intensities until Darrold Comment rates the exercises a 13-17 on Nacho RPE.

## 2021-02-13 NOTE — PROGRESS NOTES
Title:  CPAP/BiLevel Titration's    Approved by:  Kostas Negron MD      Approval Date:  December, 2019 Next Review:  December, 2021     Responsible Party:  [facility] Applies to:   Criselda Azevedo Number:  None    Document Type:  Such as Guideline, Policy, Policy & Procedure, or Procedure, Instructions  Manual:  Policy and Procedures    Section: IV Policy Start Date: October, 6944           POLICY: Positive airway pressure device is used to treat patients with sleep related breathing disorders characterized by full or partial occlusion of the upper airway during sleep. A patient must have undergone polysomnography and diagnosed with obstructive sleep apnea. All individuals who record sleep studies must follow best practices for CPAP/bilevel/ASV titrations in order to attain the ideal pressure setting for their patients. Too low of pressure may cause patients to either be sub-optimally treated or to wake up in a panic. Too much pressure may cause the patient to experience bloating or mask leakage. Determining the appropriate pressure setting for each patient will lead to improved adherence and outcome. Titrations are not an exact science, and it is understood that technologists may need to make minor changes for individual patients. The procedures outlined below are meant to be a guideline and follow the spirit of the AASM clinical guidelines. PROCEDURE:    CPAP:    1. Review the patients pertinent medical al history, previous sleep study or studies to ass the severity of sleep disordered breathing. Review of pertinent information will help to attain a better titration. 2. Applications of electrodes, montages, filters, sensitivities and scoring will be performed according to the current version of the AASM Scoring Manual.   3. Prior to initiating study collect all appropriate PAP supplies  a.  Tubing b. Humidifier (filled with distilled water)  c. Masks   4. The technologist should assess and measure patient for most appropriate mask prior to start of study. 5. CPAP should be initiated at 5 cm H20.  a. More pressure at start of study may be necessary if patient is morbidly obese or unable to fall asleep on a pressure of 5 cm H20   6. If apneas or frequent hypopneas are present, pressure settings should be increased by 2 cm H20. If occasional hypopneas, snoring, or mask flow limitation are present, pressure settings should be increased by 1 cm H20 and maintained for at least fie minutes to determine if events improve or resolve. Pressure settings may need to be increased more quickly during REM sleep given the limited amount of REM during sleep and the need to treat events during this stage. 7. If a mask leak occurs, the tech should first fix the leakage before raising the pressure. Otherwise, the final pressure setting chosen for the patient may be too high. Once the mask leak has been fixed, decrease the pressure to the last setting where mouth breathing and/or mask leakage was not present, and then re-titrate as indicated. Make sure to document directly on the study the steps taken to resolve the leak and the type of masks used. Pressure setting usually do not need to be set as high with a nasal-mask than with a full-face mask. 8. The recording technologist should document directly on the study at least every 30 minutes. 9. If the patient takes a break from wearing the mask, do not decrease the CPAP pressure on attempted return to sleep unless the patient remains awake for 15 minutes or the patient specifically requests that the pressure be lowered. 10. Do not raise pressure for central apneas. If the patient develops central apneas, pressure setting may need to be lowered.    11. If the patient is unable to tolerate CPAP secondary due to: a. Persistent mouth breathing despite use of a full-face mask/chin strap  b. Inability to exhale against higher expiratory pressures (typically beginning anywhere from 15 to 20 cm of H20.  c. Has frequent central apneas, the use of bilevel positive airway pressure may be indicated. Document directly on study why the patient is being switched from CPAP to bilevel. 12. Ensure that supine sleep has been seen on the chosen setting. Going above the chosen setting 1 or 2 cm H20 to show range may be helpful to ensure that the correct pressure has been established. BiLEVEL:    1. Technologist may change from CPAP to bilevel during a study if proven the patient is unable to tolerate CPAP. 2. Review the patients pertinent medical al history, previous sleep study or studies to ass the severity of sleep disordered breathing. Review of pertinent information will help to attain a better titration. 3. Applications of electrodes, montages, filters, sensitivities and scoring will be performed according to the current version of the AASM Scoring Manual.   4. Prior to initiating study collect all appropriate PAP supplies  a. Tubing   b. Humidifier (filled with distilled water)  c. Masks   5. The technologist should assess and measure patient for most appropriate mask prior to start of study. 6. If the patient has not previously been on CPAP, beginning pressures should be 8/4 cm H20 or higher if patient is morbidly obese or unable to fall asleep at lower pressures. If the patient has been previously successfully treated on CPAP start expiratory pressure at therapeutic setting and set inspiratory pressure 4 cm H20 higher. If advancing from CPAP to bilevel during a study expiratory pressure should be set at most successful CPAP setting and inspiratory pressure set 4 cm h20 higher. 7. The standard differential pressure utilized during bilevel titrations typically ranges from 3 to 5 cm H20, with 4 cm H20 being the most common. Higher differential pressures may be needed in patients who are morbidly obese or who have neuromuscular diseases. 8. If apneas or frequent hypopneas are present, inspiratory and expiratory pressure settings should be increased by 2 cm H20. If occasional hypopneas, snoring, or mask flow limitation are present inspiratory and expiratory pressures settings should be increased by 1 cm h20 and maintained for at least 5 minutes to determine if events improve or resolve. Pressure settings may need to be increased more quickly during REM sleep given the limited amount of REM during sleep and the need to treat events during this stage. 9. If a mask leak occurs, the tech should first fix the leakage before raising the pressure. Otherwise, the final pressure setting chosen for the patient may be too high. Once the mask leak has been fixed, decrease the pressure to the last setting where mouth breathing and/or mask leakage was not present, and then re-titrate as indicated. Make sure to document directly on the study the steps taken to resolve the leak and the type of masks used. Pressure setting usually do not need to be set as high with a nasal-mask than with a full-face mask. 10. The recording technologist should document directly on the study at least every 30 minutes. 11. If the patient takes a break from wearing the mask, do not decrease the CPAP pressure on attempted return to sleep unless the patient remains awake for 15 minutes or the patient specifically requests that the pressure be lowered. 12. Do not raise pressure for central apneas. If the patient develops central apneas, pressure setting may need to be lowered. If the patient has central apneas on bilevel, the use of spontaneous (ST) mode may be indicated. a.  ST mode may only be used in 2 cases

## 2021-02-15 ENCOUNTER — HOSPITAL ENCOUNTER (OUTPATIENT)
Dept: CARDIAC REHAB | Age: 66
Setting detail: THERAPIES SERIES
Discharge: HOME | End: 2021-02-15
Payer: COMMERCIAL

## 2021-02-15 LAB — STATUS: NORMAL

## 2021-02-15 PROCEDURE — G0423 INTENS CARDIAC REHAB NO EXER: HCPCS

## 2021-02-15 PROCEDURE — G0422 INTENS CARDIAC REHAB W/EXERC: HCPCS

## 2021-02-17 ENCOUNTER — HOSPITAL ENCOUNTER (OUTPATIENT)
Dept: CARDIAC REHAB | Age: 66
Setting detail: THERAPIES SERIES
Discharge: HOME | End: 2021-02-17
Payer: COMMERCIAL

## 2021-02-17 PROCEDURE — G0422 INTENS CARDIAC REHAB W/EXERC: HCPCS

## 2021-02-17 PROCEDURE — G0423 INTENS CARDIAC REHAB NO EXER: HCPCS

## 2021-02-17 NOTE — PROGRESS NOTES
Tiffany MORROW.:  1955    Acct Number: [de-identified]   MRN:  [id number]                             [facility] HEALTHY MIND-SET WORKSHOP             Date: 2021        Session #6    Todays class covered:    (x )  Stress and Health Workshop:  [facility] patient will learn about the body's adaptive response that is triggered by a variety of stressors. Patient will gain insight into the toll that chronic stress takes on their health, both emotionally and physically. ( ) Taking Charge of Stress Workshop:  [facility] patient will learn and practice a variety of stress management techniques. Patient will be able to effectively apply coping mechanisms in perceived stressful situations. ( ) New Thoughts New Behaviors Workshop: [facility] patient will learn and practice techniques for developing effective health and lifestyle goals. Patient will be able to effectively apply the goal setting process learned to develop at least one new personal goal.     ( ) Managing Moods & Relationships Workshop:  [facility] patient will learn how emotional and chronic stress factors can impact their hearts. They will learn healthy ways to handle stress and utilize positive coping mechanisms. In addition, [facility] patient will learn ways to improve communication skills. Marcy Montenegro actively participated and verbalized understanding. Total time in the Healthy Mind-Set class was 40 minutes.     Electronically signed by Alpa Nelson RN on 2021 at 2:19 PM

## 2021-02-19 ENCOUNTER — APPOINTMENT (OUTPATIENT)
Dept: CARDIAC REHAB | Age: 66
End: 2021-02-19
Payer: COMMERCIAL

## 2021-02-19 ENCOUNTER — HOSPITAL ENCOUNTER (OUTPATIENT)
Dept: CARDIAC REHAB | Age: 66
Setting detail: THERAPIES SERIES
Discharge: HOME | End: 2021-02-19
Payer: COMMERCIAL

## 2021-02-19 NOTE — PLAN OF CARE
Hospital Facility-Based Program  Phase 2 Cardiac Rehab Weekly Progress Report      Patient prescribed exercise:  2:15 class. 3 times per week in rehab, 1-4 times per week at home for the amount of sessions/weeks specified by insurance. Current Levels: Treadmill: 2. 6mph/0% for 12 minutes, Schwinn Airdyne: Level 1.1 for 12 minutes,  UBE: Level 0.6 for 5 minutes. Progression Discussion: Increase Aerobic exercise 15 minutes to work on endurance. Attempt to increase intensity by 5-20% for each modality this week. Try to increase intensities until Holly Lam rates the exercises a 13-17 on Nacho RPE.

## 2021-02-22 ENCOUNTER — HOSPITAL ENCOUNTER (OUTPATIENT)
Dept: CARDIAC REHAB | Age: 66
Setting detail: THERAPIES SERIES
Discharge: HOME | End: 2021-02-22
Payer: COMMERCIAL

## 2021-02-22 PROCEDURE — G0422 INTENS CARDIAC REHAB W/EXERC: HCPCS

## 2021-02-22 PROCEDURE — G0423 INTENS CARDIAC REHAB NO EXER: HCPCS

## 2021-02-22 NOTE — PROGRESS NOTES
Video Education Report - ICR/CR  Name:  Michael Issa     Date:  2/22/2021  MRN: [id number]     Session #:  7  Session Length: 40 min    Core Videos        []Heart Disease Risk Reduction       []Overview of Pritikin Eating Plan      []Move it          []Calorie Density         []Healthy Minds, Bodies, Hearts        []Label Reading - Part 1       []Metabolic Syndrome and Belly Fat        []How Our Thoughts Can Heal Our Hearts   []Dining Out - Part 1          [x]Exercise Action Plan    Exercise      Healthy Mind-Set  []Improving Performance    []Smoking Cessation    []Introduction to [address]  []Aging-Enhancing the Quality of Your Life  []Becoming a Pritikin   []Biology of Weight Control    []Cooking Breakfasts   []Decoding Lab Results    and Snacks  []Diseases of Our Time - Overview   []Cooking Dinner and   []Sleep Disorders     Sides  []Targeting Your Nutrition Priorities   []Healthy Salads &   Dressings  Nutrition      []Cooking Soups and   []Dining Out - Part 2     Desserts  []Fueling a Healthy Body   []Menu Workshop     Overview  []Planning Your Eating Strategy   []The Pritikin Solution  []Vitamins and Minerals    Comments:  Video completed, group discussion

## 2021-02-23 ENCOUNTER — TELEPHONE (OUTPATIENT)
Dept: SLEEP CENTER | Age: 66
End: 2021-02-23

## 2021-02-23 DIAGNOSIS — G47.33 OSA (OBSTRUCTIVE SLEEP APNEA): Primary | ICD-10-CM

## 2021-02-23 NOTE — PROGRESS NOTES
[address] [place] [address]                               SLEEP STUDY REPORT    PATIENT NAME: Paty Rajput                   :        1955  MED REC NO:   [id number]                           ROOM:  ACCOUNT NO:   [de-identified]                           ADMIT DATE: 2021  PROVIDER:     Red Isaac M.D.    Derick Huerta STUDY:  2021    CPAP TITRATION POLYSOMNOGRAM    REFERRING PROVIDER:  Roseanne Gaffney CNP    HISTORY OF PRESENT ILLNESS:  The patient is a 50-year-old gentleman  brought back to the sleep lab for PAP titration polysomnogram for  diagnosis of obstructive sleep apnea. Weight 195 pounds, height 66  inches, BMI 31.5. METHODS:  The patient underwent digital polysomnography in compliance  with the standards and specifications from the AASM Manual including the  simultaneous recording of 3 EEG channels (F4-M1, C4-M1, and O2-M1 with  back up electrodes F3-M2, C3-M2, and O1-M2), 2 EOG channels (E1-M2, and  E2-M1,), EMG (chin, left & right leg), EKG, Nonin pulse oximetry with  less than 2 second averaging time, body position, airflow recorded by  oral-nasal thermal sensor and nasal air pressure transducer, plus  respiratory effort recorded by calibrated respiratory inductance  plethysmography (RIP), flow volume loop, sound and video. Sleep staging  and scoring followed the standard put forth by the American Academy of  Sleep Medicine and utilized the 4A obstructive hypopnea event  desaturation of 4 percent or greater. DETAILS OF THE STUDY:  Lights out 09:52 p.m., lights on 04:58 a.m. Total recording time 426 minutes, sleep period time 422 minutes, sleep  efficiency 86.7%. Latency to sleep 3.7 minutes. Wake after sleep onset  52.9 minutes. Latency to  minutes. SLEEP STAGIN.5 minutes in N1 sleep, 234 minutes in N2 sleep, 57  minutes in N3 sleep, 30 minutes in REM sleep. BODY POSITION SUMMARY:  113 minutes supine, 256 minutes on the left  side. PERIODIC LIMB MOVEMENT SUMMARY:  There was none. ECG SUMMARY:  The patient remained in normal sinus rhythm through the  night. PAP TITRATION POLYSOMNOGRAM:  Several pressures were tried through the  night including BiPAP. Higher pressures trigger treatment emergent  central events. The best pressure through the night was at CPAP of 7. At this pressure, the patient was monitored for 2 hours and 12 minutes. There were 16 minutes of REM sleep and 1 hour and 50 minutes of non-REM  sleep. Two central apneas and 4 obstructive hypopneas were recorded for  an AHI of 2.9. Lowest oxygen saturation 93%. Unfortunately, supine  sleep was not recorded at this pressure. ASSESSMENT:  1.  Obstructive sleep apnea. 2.  Adequate titration during non-supine sleep was achieved with a CPAP  of 7 cm of water. Higher pressures triggered central events. RECOMMENDATIONS:  Chosen interface was an F30 full face mask,  medium-size. Followup in the sleep clinic eight weeks after initiating  PAP therapies to review of download.         Daria Hooker M.D.    D: 02/22/2021 22:28:36       T: 02/23/2021 4:09:25     [email or web address]  Job#: [id number]     Doc#: [id number]    CC:

## 2021-02-23 NOTE — TELEPHONE ENCOUNTER
Faxed CPAP setup order to [facility]  per patient's request.      Hennie Meigs  2/23/2021

## 2021-02-24 ENCOUNTER — HOSPITAL ENCOUNTER (OUTPATIENT)
Dept: CARDIAC REHAB | Age: 66
Setting detail: THERAPIES SERIES
Discharge: HOME | End: 2021-02-24
Payer: COMMERCIAL

## 2021-02-24 PROCEDURE — G0422 INTENS CARDIAC REHAB W/EXERC: HCPCS

## 2021-02-24 PROCEDURE — G0423 INTENS CARDIAC REHAB NO EXER: HCPCS

## 2021-02-26 ENCOUNTER — APPOINTMENT (OUTPATIENT)
Dept: CARDIAC REHAB | Age: 66
End: 2021-02-26
Payer: COMMERCIAL

## 2021-02-26 ENCOUNTER — HOSPITAL ENCOUNTER (OUTPATIENT)
Dept: CARDIAC REHAB | Age: 66
Setting detail: THERAPIES SERIES
Discharge: HOME | End: 2021-02-26
Payer: COMMERCIAL

## 2021-02-26 NOTE — PLAN OF CARE
Hospital Facility-Based Program  Phase 2 Cardiac Rehab Weekly Progress Report      Patient prescribed exercise:  2:15 class. 3 times per week in rehab, 1-4 times per week at home for the amount of sessions/weeks specified by insurance. Current Levels: Treadmill: 2. 6mph/0% for 12 minutes, Schwinn Airdyne: Level 1.2 for 12 minutes,  UBE: Level 0.6 for 5 minutes. Progression Discussion: Increase Aerobic exercise 15 minutes to work on endurance. Attempt to increase intensity by 5-20% for each modality this week. Try to increase intensities until Holly Lam rates the exercises a 13-17 on Nacho RPE.

## 2021-03-01 ENCOUNTER — HOSPITAL ENCOUNTER (OUTPATIENT)
Dept: CARDIAC REHAB | Age: 66
Setting detail: THERAPIES SERIES
Discharge: HOME | End: 2021-03-01
Payer: COMMERCIAL

## 2021-03-01 ENCOUNTER — HOSPITAL ENCOUNTER (OUTPATIENT)
Dept: CARDIAC REHAB | Age: 66
Setting detail: THERAPIES SERIES
End: 2021-03-01
Payer: COMMERCIAL

## 2021-03-01 PROCEDURE — G0422 INTENS CARDIAC REHAB W/EXERC: HCPCS

## 2021-03-01 NOTE — PLAN OF CARE
[address] [place] [address] Facility-Based Program  Individualized Cardiac Treatment Plan    Patient Name:  Michael Issa  :  1955  Age:  60 y.o. MRN:  [id number]  Diagnosis: PCI  Date of Event: 21   Physician:  José Nielsen Office Visit:    Date Entered Program: 21  Risk Stratifications: [x] Low [] Intermediate [] High  Allergies: No Known Allergies    COVID -19 Screen  Do you have any of the following symptoms:  [] Fever [] Cough [] SOB [] Muscle/Body Ache [] Loss of taste/smell [x] None    Have you traveled outside of the [place]? [] Yes      [x] No    Have you been around anyone who has tested Positive for COVID 19?  [] Yes      [x] No    The following Education has been completed with patient. [x] Wait in the lobby until we call you back to rehab. [x] You must wear a mask while in the medical center, and in cardiac rehab. Please bring your own. [x] Bring your own bottle of water with you. [x] You can bring a visitor with you, however, they will need to sit in the waiting room while you are in cardiac rehab.     Individual Cardiac Treatment Plan EXERCISE  INITIAL 30 DAY 60 DAY 90  DAY FINAL DAY   EXERCISE  ASSESSMENT/PLAN EXERCISE  REASSESSMENT EXERCISE   REASSESSMENT EXERCISE   REASSESSMENT EXERCISE   REASSESSMENT EXERCISE  DISCHARGE/FOLLOW-UP   Date: 21 Date:3/1/2021 Date: Date: Date: Date:   Session #1 Session # 16 Session # ** Session # ** Session # ** Session # **  Last session completed on **   Stages of Change Stages of Change Stages of Change Stages of Change Stages of Change Stages of Change   [] Pre Contemplation  [] Contemplation  [] Preparation  [x] Action  [] Maintenance  [] Relapse [] Pre Contemplation  [] Contemplation  [] Preparation  [x] Action  [] Maintenance  [] Relapse [] Pre Contemplation  [] Contemplation  [] Preparation  [] Action  [] Maintenance  [] Relapse [] Pre Contemplation  [] Contemplation [] Preparation  [] Action  [] Maintenance  [] Relapse [] Pre Contemplation  [] Contemplation  [] Preparation  [] Action  [] Maintenance  [] Relapse [] Pre Contemplation  [] Contemplation  [] Preparation  [] Action  [] Maintenance  [] Relapse   EXERCISE ASSESSMENT EXERCISE ASSESSMENT EXERCISE ASSESSMENT EXERCISE ASSESSMENT EXERCISE ASSESSMENT EXERCISE ASSESSMENT   6 Min Walk Test  Distance walked:   0.26 miles  1372 ft.  3 METs  Max HR:88 BPM      RPE:  13    THR:  107-126  Rhythm:  NSR     6 Min Walk Test  Distance walked:   ** miles  ** ft  ** METs  Max HR:** BPM      RPE:  **  %Change ft= **    Rhythm:  **   DASI: 8.9 METs DASI: 8.9 METs DASI: ** METs DASI: ** METs DASI: ** METs DASI: ** METs   Return to Work  [employer] on returning to work? [] Yes              [] No   [] Disabled     [x] Retired     Return to work:  Has Sade Barrett returned to work? [] Yes    [x] No     Return to work:  Has Sade Barrett returned to work? [] Yes    [] No    Return to work date set? [] Yes, **    [] No    Sade Barrett is doing ** at work. Return to work:  Has Sade Barrett returned to work? [] Yes    [] No    Return to work date set? [] Yes, **    [] No    Sade Barrett is doing ** at work. Return to work:  Has Sade Barrett returned to work? [] Yes    [] No    Return to work date set? [] Yes, **    [] No    Sade Barrett is doing ** at work. Return to work:  Has Sade Barrett returned to work? [] Yes    [] No    Return to work? [] Yes, **    [] No    *Required MET Level achieved for job duties? [] Yes    [] No   Orthopedic Limitations/  [] Yes    [x] No       Orthopedic Limitations  *If patient has orthopedic issue:   Actions/  accomodations needed to make Sade Barrett successful :na Orthopedic Limitations   Orthopedic Limitations   Orthopedic Limitations Orthopedic Limitations     Fall Risk  Fall risk assessed? [x] Yes      [] No    Balance Issues?   [] Yes      [x] No     [] Hassan Litten [] Shraddha Majano    [x] Safety issues reviewed      Fall Risk *If patient is a fall risk, action needed to accommodate: na  Fall Risk Fall Risk Fall Risk Fall Risk   Home Exercise  [x] Yes    [] No  Type: bike, TM, elliptical  Frequency: 4 x per week  Duration: 60 min Home Exercise  [x] Yes    [] No  Type: Bike, TM and ellipitcal  Frequency:4x week  Duration: 60 min Home Exercise  [] Yes    [] No  Type: **  Frequency: **  Duration: ** Home Exercise  [] Yes    [] No  Type: **  Frequency: **  Duration: ** Home Exercise  [] Yes    [] No  Type: **  Frequency: **  Duration: ** Home Exercise  [] Yes    [] No  Type: **  Frequency: **  Duration: **   Angina with Activity? [] Yes    [x] No  Angina Management: na Angina with Activity? [x] Yes    [] No  Angina Management: states had only once when had monitor on. Rested Angina with Activity? [] Yes    [] No  Angina Management: ** Angina with Activity? [] Yes    [] No  Angina Management: ** Angina with Activity? [] Yes    [] No  Angina Management: ** Angina with Activity?   [] Yes    [] No  Angina Management: **   EXERCISE PLAN EXERCISE PLAN EXERCISE PLAN EXERCISE PLAN EXERCISE PLAN EXERCISE PLAN   *Interventions* *Interventions* *Interventions* *Interventions* *Interventions* *Interventions*   Exercise Prescription  (per physician & CR staff) Exercise Prescription  (per physician & CR staff) Exercise Prescription  (per physician & CR staff) Exercise Prescription  (per physician & CR staff) Exercise Prescription  (per physician & CR staff) Exercise Prescription  (per physician & CR staff)   Cardiovascular Cardiovascular Cardiovascular Cardiovascular Cardiovascular Cardiovascular   Mode:    [x] Treadmill (TM)  [x] Schwinn Airdyne (AD)  [x] Arms Ergometer (AE)  [] NuStep  [] Elliptical (E) MODE:    [x] Treadmill (TM)  [x] Schwinn Airdyne (AD)  [x] Arms Ergometer (AE)  [] NuStep  [] Elliptical (E) MODE:    [] Treadmill (TM)  [] Schwinn Airdyne (AD)  [] Arms Ergometer (AE)  [] NuStep  [] Elliptical (E) MODE:    [] Treadmill (TM) [] Schwinn Airdyne (AD)  [] Arms Ergometer (AE)  [] NuStep  [] Elliptical (E) MODE:    [] Treadmill (TM)  [] Schwinn Airdyne (AD)  [] Arms Ergometer (AE)  [] NuStep  [] Elliptical (E) MODE:    [] Treadmill (TM)  [] Schwinn Airdyne (AD)  [] Arms Ergometer (AE)  [] NuStep  [] Elliptical (E)   Initial Workloads  TM: [email or web address] 3 METs  AD: 1.0 level = 3 METs    AE: 0.5 level = 2 METs Current Workloads  TM: 2.8 @ %=3.2  METs  AD:1.2  level = 2.9 METs  AE:0.6  level = 2 METs Current Workloads  TM:  @ %=  METs  AD:  level =  METs  NS:   Wetzel=  METs  AE:  level =  METs Current Workloads  TM:  @ %=  METs  AD:  level =  METs  NS:   Wetzel=  METs  AE:  level =  METs Current Workloads  TM:  @ %=  METs  AD:  level =  METs  NS:   Wetzel=  METs  AE:  level =  METs Current Workloads  TM:  @ %=  METs  AD:  level =  METs  NS:   Wetzel=  METs  AE:  level =  METs     Frequency:    ICR: 3x/week  Home: 2-3x/wk Frequency:   ICR: 3x/week  Home: 3x/wk Frequency:  ICR: 3x/week  Home: 3-4x/wk Frequency:  ICR: 3x/week  Home: 3-4x/wk Frequency:  ICR: 3x/week  Home: 3-4x/wk Frequency:  Delisa Alexandra will continue exercise at  5-7 days/week   Duration:   Total aerobic exercise = 30-45 min    5-8 min/mode Duration:  Total aerobic exercises = 29 min     12 min/mode Duration:  Total aerobic exercises = ** min     **min/mode Duration:  Total aerobic exercises = ** min     **min/mode Duration:  Total aerobic exercises = ** min     **min/mode Duration:  Total erobic exercise =  60-90 min   Intensity:   MET Level = 3  RPE = 12-15 Intensity:  Max MET Level = 3.2  RPE = 12-15 Intensity:  Max MET Level = **  RPE = 12-15 Intensity:  Max MET Level = **  RPE = 12-15 Intensity:  Max MET Level = **  RPE = 12-15 Intensity:  Max MET Level = ** RPE = 12-15   Progression: increase aerobic activity up to 15 min over next 4 weeks by increasing time 2-3 min/week.  Progression:Increase to 15 min then increase METS 5-10% over next 4 weeks   Progression: Progression: Progression: Progression:  Increase time/intensity when RPE <13, and HR is in [facility]   [x] Yes      [] No  Upper and Lower body strength training 2x/wk    Wt: 5#       Reps:  8-15    *Increase wt. after completing 15 reps with an RPE of <12/13. [x] Yes      [] No  Upper and Lower body strength training 2x/wk    Wt: 5#       Reps:  8-15    *Increase wt. after completing 15 reps with an RPE of <12/13. [] Yes      [] No  Upper and Lower body strength training 2x/wk    Wt: **#       Reps:  8-15    *Increase wt. after completing 15 reps with an RPE of <12/13. [] Yes      [] No  Upper and Lower body strength training 2x/wk    Wt: **#       Reps:  8-15    *Increase wt. after completing 15 reps with an RPE of <12/13. [] Yes      [] No  Upper and Lower body strength training 2x/wk    Wt: **#       Reps:  8-15    *Increase wt. after completing 15 reps with an RPE of <12/13. Continue Strength Training at home   [] Exercise Log & Strength training handout given     Wt: **#       Reps:  8-15    *Increase wt. after completing 15 reps with an RPE of <12/13. Flexibility Flexibility Flexibility Flexibility Flexibility Flexibility   [x] Yes      [] No  25 min session of Core Strength & Flexibility 1x/per week  Attends Core Strength & Flexibility   [x] Yes      [] No Attends Core Strength & Flexibility   [] Yes      [] No Attends Core Strength & Flexibility   [] Yes      [] No Attends Core Strength & Flexibility   [] Yes      [] No Continue Core Strength & Flexibility at home   Home Exercise  *eDlisa Alexandra verbalizes planning to bike, walk, elliptical 3-4 days/week for 60 min on days not in rehab. Home Exercise  *Freddy verbalizes planning to bike/walk days/week for 3-4 min on days not in rehab. Home Exercise  *Freddy verbalizes planning to ** ** days/week for ** min on days not in rehab.  Home Exercise Danette Kayser verbalizes planning to ** ** days/week for ** min on days not in rehab. Home Exercise  *Freddy verbalizes planning to ** ** days/week for ** min on days not in rehab. Home Exercise  *Travon Rex his/her plan to ** ** days/week for ** min @ **   *Education* *Education* *Education* *Education* *Education* *Education*   RPE Scale  [x] Yes      [] No  Exercise Safety  [x] Yes      [] No  Equipment Orientation  [x] Yes      [] No  S/S to Report  [x] Yes      [] No  Warm Up/Cool Down  [x] Yes      [] No  Home Exercise  [x] Yes      [] No     All Exercise Education Completed  [] Yes      [] No   Exercise Education Recommended    Workshops  [x] Improving Performance  [x] Balance Training & Fall Prevention  [x] Intro to Yoga - Video  [x] Resistance Training & Core Strength Exercise Education Attended/Date  2/15 core strength Exercise Education Attended/Date Exercise Education Attended/Date Exercise Education Attended/Date All Sessions Completed?     [] Yes  [] No   *Goals* *Goals* *Goals* *Goals* *Goals* *Goals*   Initial Exercise Goals Exercise Goals  Exercise Goals   Exercise Goals  Exercise Goals  Exercise Goals   Freddy plans to:  [x] Attend exercise sessions 3x/wk  [x] initiate home exercise 2-3x/wk for 10-20 min  [x] Increase 6 min walk distance by 10%  [x] Attend Exercise workshops [place]han plans to:  [x] Attend exercise sessions 3x/wk  [x] continue home exercise 2-3x/wk for 20-30 min  [x] Attend Exercise workshops Freddy's plans to:  [x] Attend exercise sessions 3x/wk  [x] continue home exercise 3-4x/wk for 30-45 min  [x] Determine plan of exercise following rehab  [x] Attend Exercise workshops Freddy's plans to:  [x] Attend exercise sessions 3x/wk  [x] continue home exercise 3-4x/wk for 30-45 min  [x] Determine plan of exercise following rehab  [x] Attend Exercise workshops Freddy's plans to:  [x] Attend exercise sessions 3x/wk  [x] continue home exercise 3-4x/wk for 30-45 min [x] Determine plan of exercise following rehab  [x] Attend Exercise workshops Mart Small achieved exercise goals?     []  Yes    [] No  If no, why?  **  [] Increased 6 min walk distance by 10%  [] Currently exercising 30-60 min/day, 5-7days/wk   [] Plans to continue exercise on own  [] Plans to join a local fitness center to continue exercise  [] Does not plan to continue to exercise after rehab   Return to ADL or Hobbies:  Mart Small would like to improve strength and endurance so he is able to return to hunt, cross bow, shot gun, household chores Return to ADL or Hobbies:  Mart Small would like to improve strength and endurance so he is able to return to shot gun Return to ADL or Hobbies:  Mart Small would like to improve strength and endurance so he/she is able to return to ** Return to ADL or Hobbies:  Mart Small would like to improve strength and endurance so he/she is able to return to ** Return to ADL or Hobbies:  Mart Small would like to improve strength and endurance so he/she is able to return to ** Return to ADL or Hobbies:  Mart Small would like to improve strength and endurance so he/she is able to return to **    *MET level required for above goal:  6-7 METs MET level Achieved:  3.2 METs MET level Achieved:  **METs MET level Achieved:  **METs MET level Achieved:  **METs MET level Achieved:  **METs     Individual Cardiac Treatment Plan  Nutrition  NUTRITION  ASSESSMENT/PLAN NUTRITION  REASSESSMENT NUTRITION   REASSESSMENT NUTRITION   REASSESSMENT NUTRITION  DISCHARGE/FOLLOW-UP NUTRITION  DISCHARGE/FOLLOW-UP   Stages of Change Stages of Change Stages of Change Stages of Change Stages of Change Stages of Change   [x] Pre Contemplation  [] Contemplation  [] Preparation  [] Action  [] Maintenance  [] Relapse [] Pre Contemplation  [x] Contemplation  [] Preparation  [] Action  [] Maintenance  [] Relapse [] Pre Contemplation  [] Contemplation  [] Preparation  [] Action  [] Maintenance  [] Relapse [] Pre Contemplation [] Contemplation  [] Preparation  [] Action  [] Maintenance  [] Relapse [] Pre Contemplation  [] Contemplation  [] Preparation  [] Action  [] Maintenance  [] Relapse [] Pre Contemplation  [] Contemplation  [] Preparation  [] Action  [] Maintenance  [] Relapse   NUTRITION ASSESSMENT NUTRITION ASSESSMENT NUTRITION ASSESSMENT NUTRITION ASSESSMENT NUTRITION ASSESSMENT NUTRITION ASSESSMENT   Weight Management  Weight: 195.8        Height: 66\"   BMI: 31.7  Weight Management  Weight: 195.8                  Weight Management  Weight: **                  Weight Management  Weight: ** Weight Management  Weight: ** Weight Management  Weight: **                    BMI: **   Eating Plan  Current eating habits: nothing in particular, portion control Eating Plan  Changes:less salt portion control Eating Plan  Changes: Eating Plan  Changes: Eating Plan  Changes: Eating Plan Improvements:   Alcohol Use  [] none          [] daily  [x] weekly      [] special   Type: beer, wiskey  Amount: 4+        Diet Assessment Tool:  RATE YOUR PLATE  *Given to patient to complete and return. Diet Assessment Tool:    Score:45 /69        Diet Assessment Tool: RATE YOUR PLATE  Score: **/41   NUTRITION PLAN NUTRITION PLAN NUTRITION PLAN NUTRITION PLAN NUTRITION PLAN NUTRITION PLAN   *Interventions* *Interventions* *Interventions* *Interventions* *Interventions* *Interventions*   Initial Survey given Goal Setting Discussion:   [x] Yes      [] No        Follow Up Survey Reviewed & Goals Updated:     Professional Referral  Please check if needed. [] Dietitian Consult   [] Wt. Management Referral  [] Other:  Professional Referral  Please check if needed. [] Dietitian Consult   [] Wt. Management Referral  [] Other: Professional Referral  Please check if needed. [] Dietitian Consult   [] Wt. Management Referral  [] Other: Professional Referral  Please check if needed. [] Dietitian Consult   [] Wt.  Management Referral  [] Other: Professional Referral Use knowledge gained to continue Pritikin eating plan at home       Individual Cardiac Treatment Plan  Psychosocial  PSYCHOSOCIAL  ASSESSMENT/PLAN PSYCHOSOCIAL  REASSESSMENT PSYCHOSOCIAL   REASSESSMENT PSYCHOSOCIAL   REASSESSMENT PSYCHOSOCIAL  DISCHARGE/FOLLOW-UP PSYCHOSOCIAL  DISCHARGE/FOLLOW-UP   Stages of Change Stages of Change Stages of Change Stages of Change Stages of Change Stages of Change   [] Pre Contemplation  [x] Contemplation  [] Preparation  [] Action  [] Maintenance  [] Relapse [] Pre Contemplation  [x] Contemplation  [] Preparation  [] Action  [] Maintenance  [] Relapse [] Pre Contemplation  [] Contemplation  [] Preparation  [] Action  [] Maintenance  [] Relapse [] Pre Contemplation  [] Contemplation  [] Preparation  [] Action  [] Maintenance  [] Relapse [] Pre Contemplation  [] Contemplation  [] Preparation  [] Action  [] Maintenance  [] Relapse [] Pre Contemplation  [] Contemplation  [] Preparation  [] Action  [] Maintenance  [] Relapse   PSYCHOSOCIAL ASSESSMENT PSYCHOSOCIAL ASSESSMENT PSYCHOSOCIAL ASSESSMENT PSYCHOSOCIAL ASSESSMENT PSYCHOSOCIAL ASSESSMENT PSYCHOSOCIAL ASSESSMENT   Behavioral Outcomes Behavioral Outcomes Behavioral Outcomes Behavioral Outcomes Behavioral Outcomes Behavioral Outcomes   Tool Used:  Ferrans & Franco, Quality of Life Index, Cardiac Version IV  *Given to patient to complete.  Tool Used:    Cynthia & Salvador, Quality of Life Index, Cardiac Version IV     QOL Index Score: **  HF:**  S&E:**  P&S: **  Family: **   Tool Used:     Cynthia & Franco, Quality of Life Index, Cardiac Version IV    QOL Index Score: **  HF:**  S&E:**  P&S: **  Family: ** Tool Used:     Cynthia & Franco, Quality of Life Index, Cardiac Version IV    QOL Index Score: **  HF:**  S&E:**  P&S: **  Family: **   PHQ-9 score 2  Depression Severity  [x]Minimal  []Mild   []Moderate  []Moderately Severe  []Severe    PHQ-9 score **  Depression Severity  []Minimal  []Mild   []Moderate []Moderately Severe []Severe PHQ-9 score **  Depression Severity  []Minimal  []Mild   []Moderate  []Moderately Severe []Severe   Does patient have Family Support? [x] Yes      [] No  No signs of marital/family distress        Within the Past Month:  *Have you wished you were dead or wished you could go to sleep and not wake up? [] Yes      [x] No  *Have you had any thoughts of killing yourself? [] Yes      [x] No          Using a scale of 0-10, 0=none, 10=very:   Rate your depression: 0  Rate your anxiety:  2  Using a scale of 0-10, 0=none, 10=very:   Rate your depression: 0  Rate your anxiety:  2 Using a scale of 0-10, 0=none, 10=very:   Rate your depression: **  Rate your anxiety:  ** Using a scale of 0-10, 0=none, 10=very:   Rate your depression: **  Rate your anxiety:  ** Using a scale of 0-10, 0=none, 10=very:   Rate your depression: **  Rate your anxiety:  ** Using a scale of 0-10, 0=none, 10=very:   Rate your depression: **  Rate your anxiety:  **   Signs and Symptoms of Depression Present? [] Yes      [x] No   Signs and Symptoms of Depression Present? [] Yes      [x] No  If yes, please explain:  ** Signs and Symptoms of Depression Present? [] Yes      [] No  If yes, please explain:  ** Signs and Symptoms of Depression Present? [] Yes      [] No  If yes, please explain:  ** Signs and Symptoms of Depression Present? [] Yes      [] No  If yes, please explain:  ** Signs and Symptoms of Depression Present? [] Yes      [] No  If yes, please explain:  **   Signs and Symptoms of Anxiety Present? [] Yes      [x] No   Signs and Symptoms of Anxiety Present? [] Yes      [x] No  If yes, please explain:  ** Signs and Symptoms of Anxiety Present? [] Yes      [] No  If yes, please explain:  ** Signs and Symptoms of Anxiety Present? [] Yes      [] No  If yes, please explain:  ** Signs and Symptoms of Anxiety Present?     [] Yes      [] No If yes, please explain:  ** Signs and Symptoms of Anxiety Present? [] Yes      [] No  If yes, please explain:  **   [] Patient has poor eye contact   [] Flat affect present. [] Signs of anxiety, anger or hostility    [] Signs social isolation present. []  Signs of alcohol or substance abuse        PSYCHOSOCIAL PLAN PSYCHOSOCIAL PLAN PSYCHOSOCIAL PLAN PSYCHOSOCIAL PLAN PSYCHOSOCIAL PLAN PSYCHOSOCIAL PLAN   *Interventions* *Interventions* *Interventions* *Interventions* *Interventions* *Interventions*   *Please check if needed  [] Psych Consult  [] Physician Referral  [x] Stress Management Skills *Please check if needed  [] Psych Consult  [] Physician Referral  [x] Stress Management Skills *Please check if needed  [] Psych Consult  [] Physician Referral  [] Stress Management Skills *Please check if needed  [] Psych Consult  [] Physician Referral  [] Stress Management Skills *Please check if needed  [] Psych Consult  [] Physician Referral  [] Stress Management Skills *Please check if needed  [] Psych Consult  [] Physician Referral  [] Stress Management Skills   Is patient currently taking anti-depressant or anti-anxiety medications? [] Yes      [x] No   Change in anti-depressant or anti-anxiety medications? [] Yes      [x] No  If yes, please list medications:   Change in anti-depressant or anti-anxiety medications? [] Yes      [] No  If yes, please list medications:  ** Change in anti-depressant or anti-anxiety medications? [] Yes      [] No  If yes, please list medications:  ** Change in anti-depressant or anti-anxiety medications? [] Yes      [] No  If yes, please list medications:  ** Change in anti-depressant or anti-anxiety medications?   [] Yes      [] No  If yes, please list medications:  **   *Education* *Education* *Education* *Education* *Education* *Education*   Healthy Mind-Set Workshops Recommended  [x] Stress & Health  [x] Taking Charge of Stress  [x] New Thoughts, New Behaviors [x] Managing Moods & Relationships Healthy Mind-Set Workshops Attended/Date  2/17Stress and health Healthy Mind-Set Workshops Attended/Date Healthy Mind-Set Workshops Attended/Date Healthy Mind-Set Workshops  Completed  [] Yes      [] No Healthy Mind-Set Workshops  Completed  [] Yes      [] No   *Goals* *Goals* *Goals* *Goals* *Goals* *Goals*   Freddy's psychosocial goals are as follows:  Complete HADS & Cynthia & Salvador, Quality of Life Index, Cardiac Version IV Freddy's psychosocial goals are as follows:  [x] Attend Healthy Mind-Set Workshops  [x] Reduce depression symptom severity by 1 level  [] ** Freddy's psychosocial goals are as follows:  [] Attend Healthy Mind-Set Workshops  [] Reduce depression symptom severity by 1 level  [] ** Freddy's psychosocial goals are as follows:  [] Attend Healthy Mind-Set Workshops  [] Reduce depression symptom severity by 1 level  [] Jeri Rendon achieved psychosocial goals? [] Yes    [] No  If no, why?  **  [] Use methods learned to continue to reduce depression symptom severity by 1 level  [] Jeri Rendon achieved psychosocial goals?   [] Yes    [] No  If no, why?  **  [] Use methods learned to continue to reduce depression symptom severity by 1 level  [] **     Individual Cardiac Treatment Plan  Other:  Risk Factor/Education  RISK FACTOR  ASSESSMENT/PLAN RISK FACTOR  REASSESSMENT  RISK FACTOR  REASSESSMENT RISK FACTOR  REASSESSMENT RISK FACTOR   DISCHARGE/FOLLOW-UP RISK FACTOR   DISCHARGE/FOLLOW-UP   Stages of Change Stages of Change Stages of Change Stages of Change Stages of Change Stages of Change   [] Pre Contemplation  [] Contemplation  [x] Preparation  [] Action  [] Maintenance  [] Relapse [] Pre Contemplation  [] Contemplation  [x] Preparation  [] Action  [] Maintenance  [] Relapse [] Pre Contemplation  [] Contemplation  [] Preparation  [] Action  [] Maintenance  [] Relapse [] Pre Contemplation  [] Contemplation  [] Preparation  [] Action  [] Maintenance [] Relapse [] Pre Contemplation  [] Contemplation  [] Preparation  [] Action  [] Maintenance  [] Relapse [] Pre Contemplation  [] Contemplation  [] Preparation  [] Action  [] Maintenance  [] Relapse   RISK FACTOR/EDUCATION ASSESSMENT RISK FACTOR/EDUCATION ASSESSMENT RISK FACTOR/EDUCATION ASSESSMENT RISK FACTOR/EDUCATION ASSESSMENT RISK FACTOR /EDUCATION ASSESSMENT RISK FACTOR /EDUCATION ASSESSMENT   Hypertension  [x] Yes      [] No    Resting BP: 114/68  Peak Ex BP:142/78  Medication: losartan, metoprolol   Hypertension  Resting BP: 114/62  Peak Ex BP:154/62  Medication Changes:  [] Yes      [x] No Hypertension  Resting BP: **  Peak Ex BP:**  Medication Changes:  [] Yes      [] No Hypertension  Resting BP: **  Peak Ex BP:**  Medication Changes:  [] Yes      [] No Hypertension  Resting BP: **  Peak Ex BP:**  Medication Changes:  [] Yes      [] No Hypertension  Resting BP: **  Peak Ex BP:**  Medication Changes:  [] Yes      [] No   Lipids  HLD/DLD  [x] Yes      [] No  TOTAL CHOL: 141  HDL:  48  LDL:  81  TRI  Medication: atorvastatin Lipids  Medication Changes:  [] Yes      [x] No     Lipids  Medication Changes:  [] Yes      [] No     Lipids  Medication Changes:  [] Yes      [] No     Lipids    TOTAL CHOL: **  HDL:  **  LDL:  **  TRIG:  **  Medication Changes:  [] Yes      [] No Lipids    TOTAL CHOL: **  HDL:  **  LDL:  **  TRIG:  **  Medication Changes:  [] Yes      [] No   Diabetes  [] Yes      [x] No  FBS: 104            Diabetes  na   Diabetes  Most Recent BS:  BS have been in range  [] Yes      [] No  Medication Changes  [] Yes      [] No     Diabetes  Most Recent BS:  BS have been in range  [] Yes      [] No  Medication Changes  [] Yes      [] No     Diabetes  Most Recent BS:  BS have been in range  [] Yes      [] No  Medication Changes  [] Yes      [] No Diabetes  Most Recent BS:  BS have been in range  [] Yes      [] No  Medication Changes  [] Yes      [] No       Tobacco Use  [] Current  [x] Former [] Never    Years smoked: 48:    Date Quit: 2000    # cigarettes smoked/day: 1-2.5 pks/day    Smokeless Tobacco use:   [x] Yes      [] No  Quit 2018 Tobacco Use  Change in smoking status   [] Yes      [x] No     Tobacco Use  Change in smoking status   [] Yes      [] No    Quit date: **   Tobacco Use  Change in smoking status   [] Yes      [] No    Quit date: ** Tobacco Use  Change in smoking status   [] Yes      [] No    Quit date: ** Tobacco Use  Change in smoking status   [] Yes      [] No    Quit date: **             Learning Barriers  Please select one:  [] Speech  [] Literacy  [x] Hearing  [] Cognitive  [] Vision  [x] Ready to Learn Learning Barriers Addressed:   [x] Yes      [] No   Learning Barriers Addressed:   [] Yes      [] No   Learning Barriers Addressed:  [] Yes      [] No Learning Barriers Addressed:  [] Yes      [] No Learning Barriers Addressed:  [] Yes      [] No     RISK FACTOR/EDUCATION PLAN RISK FACTOR/EDUCATION PLAN RISK FACTOR/EDUCATION PLAN RISK FACTOR/EDUCATION PLAN RISK FACTOR/EDUCATION PLAN RISK FACTOR/EDUCATION PLAN   *Interventions* *Interventions* *Interventions* *Interventions* *Interventions* *Interventions*   Recommended Educational Videos    [x] Overview of The Pritikin Eating Plan  [x] Heart Disease Risk Reduction  [x] Move It!   [x] Calorie Density  [x] Healthy Minds, Bodies, Hearts  [x] Label Reading  [x] Metabolic Syndrome & Belly   Or  [] How Our Thoughts Can Heal our Heart  [x] Dining Out-Part 1  [x] Biomechanical Limitations  [x] Facts on Fat  [x] Hypertension & Heart Disease  [x] Diseases of Our Times-Focusing on Diabetes  [x] Body Composition  [x] Nurtition Action Plan  [x] Exercise Action Plan   Completed Videos/Date      2/4/21  Overview of The Pritikin Eating Plan  2/24 Sleep   2/22 Exercise plan  2/12 Vit and Minerals   2/10 Biomechanical limits Completed Videos/Date Completed Videos/Date Completed Videos/Date Recommended Educational Videos Completed    [] Yes      [] No **If not completed, Why? **           Smoking Cessation/Relaspe Prevention Intervention needed? [] Yes      [x] No   Smoking Cessation/Relapse Prevention Scheduled? [] Yes      [x] No  Date:  ** Smoking Cessation/Relapse Prevention completed? [] Yes      [] No  Date: ** Smoking Cessation/Relapse Prevention completed? [] Yes      [] No  Date: ** Smoking Cessation/Relapse Prevention completed? [] Yes      [] No  Date: ** Smoking Cessation Counseling attended  [] Yes      [] No  **If not completed, Why? **   Professional Referrals:  *Please check if needed  [] Diabetes Clinic  [] Lipid Clinic   [] Other:      Professional Referrals:  *Please check if needed  [] Diabetes Clinic  [] Lipid Clinic   [] Other:   Preventative Medication Preventative Medication Preventative Medication Preventative Medication Preventative Medication Preventative Medication   Aspirin  [x] Yes    [] No  Blood Thinner: Clopidogrel/Effient/Brillinta  [x] Yes    [] No  Beta Blocker  [x] Yes    [] No  Ace Inhibitor  [] Yes    [x] No  Statin/Lipid Lowering  [x] Yes    [] No Medication Changes? [] Yes    [x] No Medication Changes? [] Yes    [] No Medication Changes? [] Yes    [] No Medication Changes? [] Yes    [] No Medication Changes? [] Yes    [] No   *Education* *Education* *Education* *Education* *Education* *Education*   Does Breanna Bolton require any additional education? [] Yes    [x] No   Does Breanna Bolton require any additional education? [] Yes    [x] No Does Breanna Bolton require any additional education? [] Yes    [] No Does Breanna Bolton require any additional education? [] Yes    [] No Does Breanna Bolton require any additional education? [] Yes    [] No Does Breanna Bolton require any additional education?   [] Yes    [] No   Additional Educational Videos    Exercise  [] Improve Performance    Medical  [] Aging Enhancing Your QoL  [] Biology of Weight Control  [] Decoding Lab Results  [] Diseases of Our Time - Overview  [] Sleep Disorders    Nutrition [] Dining Out - Part 2  [] Fueling a Healthy Body  [] Menu Workshop  [] Planning Your Eating Strategy  [] Targeting Your Nutrition Priorities  [] Vitamins & Minerals    Healthy Mind-Set  [] Smoking Cessation    Culinary  []Becoming a Pritikin    [] Cooking - Breakfast & Snacks  [] Cooking -Healhty Salads & Dressing  [] Cooking -Dinner & Sides  [] Cooking -Soups & Desserts    Overview  [] The Pritikin Solution Additional Educational Videos Completed Additional Educational Videos Completed Additional Educational Videos Completed Additional Educational Videos Completed Additional Educational Videos Completed    [] Yes    [] No   *Goals* *Goals* *Goals* *Goals* *Goals* *Goals*   Freddy's risk factor/education goals are as follows:    [x] Optimal BP <140/90  [] Blood Sugar <120  [x] Attend recommended video education sessions  [x] Takes medications as prescribed 100% of the time   [] ** Freddy's risk factor/education goals are as follows:    [x] Optimal BP <140/90  [] Blood Sugar <120  [x] Attend recommended video education sessions  [x] Takes medications as prescribed 100% of the time   [] ** Freddy's risk factor/education goals are as follows:    [x] Optimal BP <140/90  [] Blood Sugar <120  [x] Attend recommended video education sessions  [x] Takes medications as prescribed 100% of the time   [] ** Freddy's risk factor/education goals are as follows:    [x] Optimal BP <140/90  [] Blood Sugar <120  [x] Attend recommended video education sessions  [x] Takes medications as prescribed 100% of the time   [] ** Freddy's risk factor/education goals are as follows:    [x] Optimal BP <140/90  [] Blood Sugar <120  [x] Attend recommended video education sessions  [x] Takes medications as prescribed 100% of the time   [] Manny Doctor achieved risk factor goals?   [] Yes    [] No  If no, why?  **     Monitored telemetry has revealed NSR Monitored telemetry has revealed NSR heart block OCC PVC/PAC wore heart monitor  2 week     Monitored telemetry has revealed  [] documented arrhythmia at increasing workloads  [] associated symptoms ** Monitored telemetry has revealed **  [] documented arrhythmia at increasing workloads  [] associated symptoms ** Monitored telemetry has revealed **  [] documented arrhythmia at increasing workloads  [] associated symptoms ** Monitored telemetry has revealed **  [] documented arrhythmia at increasing workloads  [] associated symptoms **   Physician Response    [x] Cardiac rehab is reasonably and medically necessary for continuous cardiac monitoring surveillance  of patient's cardiac activity  [x] Initiate continuous telemetry monitoring and notify me with any concerns  [] Other   Physician Response    [x] Cardiac rehab is reasonably and medically necessary for continuous cardiac monitoring surveillance  of patient's cardiac activity  [x] Continue continuous telemetry monitoring and notify me with any concerns  [] Other     Physician Response    [x] Cardiac rehab is reasonably and medically necessary for continuous cardiac monitoring surveillance  of patient's cardiac activity  [x] Continue continuous telemetry monitoring and notify me with any concerns   [] Other     Physician Response    [x] Cardiac rehab is reasonably and medically necessary for continuous cardiac monitoring surveillance  of patient's cardiac activity  [x] Continue continuous telemetry monitoring and notify me with any concerns   [] Other     Physician Response    [x] Cardiac rehab is reasonably and medically necessary for continuous cardiac monitoring surveillance  of patient's cardiac activity  [x] Continue continuous telemetry monitoring and notify me with any concerns   [] Other

## 2021-03-02 NOTE — PROGRESS NOTES
New Sleep Patient H/P    Presentation:  Zofia Goodman is referred by  for  Nocturnal bradycardia        HTN, HLD, GERD, CAD s/p PCI  C/O  Loud disruptive snoring, wife \"kicks him\" to make him stop, had T&A in 2009 with some improvement, non restorative sleep, chronic fatigue, requires daily naps  Recent PCI for 90% blockage in LAD. H/O HTN, T&N for loud snoring in 2009. Time in Bed:   Bedtime: 12a.m. Awakens  6 a.m. Different on weekends? No       Freddy falls asleep in 10  minutes. Any awakenings? Yes  Difficulty Falling back to sleep? No  Symptoms began:  a few years ago. Symptoms include: snoring, choking, periods of not breathing, excessive daytime sleepiness, falling asleep while reading, watching television, disrupted sleep, naps    Previous evaluation and treatment? No  Where? He denies any history of sleep walking or sleep talking. No history of seizures activity. No history suggestive of restless legs syndrome. No history of bruxism. No history of head injury. Naps:  Any naps? Yes and are they helpful Yes    Snoring and Apneas:  Do you snore or been told you a snore? Yes  How long have known about your snoring? years  Any witnessed apneas? No  Any awakenings with choking or gasping? No    Dreams:  Any recurring dreams? No  Hallucinations? No  Sleep Paralysis? No  Symptoms of Cataplexy? No    Driving History:  Do you have a CDL or drive long distances for work? No  Any driving accidents in the past year? No  Any sleepiness while driving? No    Weight:  Any change in weight over the past year? Yes   How about past 5 years?  Yes  How much? 20      Past Medical History:   Diagnosis Date    Calcium oxalate renal stones 2004    he has undergone ECSW lithotripsy    Essential hypertension 4/28/2016    GERD (gastroesophageal reflux disease)     Hyperlipidemia 3/2003    hypercholsterolemia    Osteoarthritis     Ulcerative colitis ([facility] [address] Past Surgical History:   Procedure Laterality Date    CARDIAC CATHETERIZATION      COLONOSCOPY      2010=surveillance for ulcerative collitis    FINGER TRIGGER RELEASE      KNEE ARTHROSCOPY      ROTATOR CUFF REPAIR      TONSILLECTOMY         Social History     Tobacco Use    Smoking status: Former Smoker     Packs/day: 1.00     Years: 10.00     Pack years: 10.00     Quit date: 3/29/1988     Years since quittin.7    Smokeless tobacco: Current User   Substance Use Topics    Alcohol use: Yes     Comment: 1-2 daily    Drug use: No       No Known Allergies    Current Outpatient Medications   Medication Sig Dispense Refill    fluticasone (FLONASE) 50 MCG/ACT nasal spray 1 spray by Each Nostril route daily      nitroGLYCERIN (NITROSTAT) 0.4 MG SL tablet up to max of 3 total doses. If no relief after 1 dose, call 911. 25 tablet 3    metoprolol tartrate (LOPRESSOR) 25 MG tablet Take 0.5 tablets by mouth 2 times daily 90 tablet 1    ticagrelor (BRILINTA) 90 MG TABS tablet Take 1 tablet by mouth 2 times daily 60 tablet 3    levocetirizine (XYZAL) 5 MG tablet Take 1 tablet by mouth nightly 90 tablet 1    hydroCHLOROthiazide (HYDRODIURIL) 25 MG tablet Take 1 tablet by mouth daily 90 tablet 3    losartan (COZAAR) 50 MG tablet Take 1 tablet by mouth daily 90 tablet 3    atorvastatin (LIPITOR) 40 MG tablet Take 1 tablet by mouth daily 90 tablet 3    omeprazole (PRILOSEC) 20 MG delayed release capsule Take 1 capsule by mouth Daily 30 capsule 5    Acetaminophen (TYLENOL) 325 MG CAPS Take by mouth      multivitamin (THERAGRAN) per tablet Take 1 tablet by mouth daily.  aspirin 81 MG EC tablet Take 81 mg by mouth daily. No current facility-administered medications for this visit.         Family History   Problem Relation Age of Onset    Cancer Mother         NHL    Heart Disease Maternal Grandmother         CAD    Diabetes Maternal Grandfather     Heart Disease Paternal Grandfather MI    Cancer Paternal Grandfather         PROSTATE        Any family history of any sleep problems or any one in your family on CPAP? No    Social History     Tobacco Use    Smoking status: Former Smoker     Packs/day: 1.00     Years: 10.00     Pack years: 10.00     Quit date: 3/29/1988     Years since quittin.7    Smokeless tobacco: Current User   Substance Use Topics    Alcohol use: Yes     Comment: 1-2 daily    Drug use: No     Caffeine Intake: How much soda (pop), coffee, tea, power drinks do you ingest per day? 1 per day. Employment History:  Where do you work? Retired last year    Review of Systems:   General/Constitutional: BMI 33  HENT: Negative. Eyes: Negative. Upper respiratory tract: No nasal stuffiness or post nasal drip. Large tongue, crowded pharynx, mallampati class 3 . Lower respiratory tract/ lungs: No cough or sputum production. No hemoptysis. Cardiovascular: No palpitations or chest pain. Gastrointestinal: No nausea or vomiting. Neurological: No focal neurologiacal weakness. Extremities: No edema. Musculoskeletal: No complaints. Genitourinary: No complaints. Hematological: Negative. Psychiatric/Behavioral: Negative. Skin: No itching. Physical Exam:    HEIGHTHeight: 5' 6\" (167.6 cm) WEIGHTWeight: 205 lb (93 kg)    BMI:  There is no height or weight on file to calculate BMI. Neck Size: 18   SAQLI: 80  ESS: 6   Vitals: There were no vitals taken for this visit. Mallampati Score: 2    Physical Exam :  Constitutional: BMI 33  HENT:   Head: Normocephalic and atraumatic. Mouth/Throat: Oropharynx is clear and moist. Large tongue, crowded pharynx, mallampati class 2  Eyes: Conjunctivae are normal. PERRLA. No scleral icterus. Neck: Neck supple. No JVD present. No tracheal deviation present. Neck size 19  Cardiovascular: Normal rate, regular rhythm, normal heart sounds. No murmur heard. Pulmonary/Chest: Effort normal and breath sounds normal. No stridor. No respiratory distress. No wheezes. No rales. Abdominal: Soft. No distension. No tenderness. Musculoskeletal: Normal range of motion. Lymphadenopathy:  No cervical adenopathy. Neurological: Alert and oriented to person, place, and time. No focal deficits. Skin: Skin is warm and dry. Patient is not diaphoretic. Psychiatric: Normal behavior with normal mood and affect. Diagnostic Data:    Assessment    Diagnosis Orders   1. Hypersomnia  Baseline Diagnostic Sleep Study   2. Snoring  Baseline Diagnostic Sleep Study   3. Obesity (BMI 30-39. 9)  Baseline Diagnostic Sleep Study   4. Bradycardia  Baseline Diagnostic Sleep Study   5. LAD stenosis  Baseline Diagnostic Sleep Study   6. H/O coronary angioplasty  Baseline Diagnostic Sleep Study   7. Essential hypertension           Plan   Orders Placed This Encounter   Procedures    Baseline Diagnostic Sleep Study     Standing Status:   Future     Standing Expiration Date:   6/28/2022     Order Specific Question:   Adult or Pediatric     Answer:   Adult Study (>7 Years)     Order Specific Question:   Location For Sleep Study     Answer:   [facility]     Order Specific Question:   Select Sleep Lab Location     Answer:   [address]        Mask Desensitization and Pre study teaching? No  Weight Loss Information Given? Yes  Sleep Hygiene Discussed? Yes    -He was advised to call [employer] regarding supplies if needed.  -He call my office for earlier appointment if needed for worsening of sleep symptoms.  -Brianna Nieto educated about my impression and plan. Patient verbalizes understanding. no

## 2021-03-03 ENCOUNTER — HOSPITAL ENCOUNTER (OUTPATIENT)
Dept: CARDIAC REHAB | Age: 66
Setting detail: THERAPIES SERIES
Discharge: HOME | End: 2021-03-03
Payer: COMMERCIAL

## 2021-03-03 PROCEDURE — G0423 INTENS CARDIAC REHAB NO EXER: HCPCS

## 2021-03-03 PROCEDURE — G0422 INTENS CARDIAC REHAB W/EXERC: HCPCS

## 2021-03-03 NOTE — PROGRESS NOTES
Video Education Report - ICR/CR  Name:  Bonifacio Meigs     Date:  3/3/2021  MRN: [id number]     Session #:    Session Length: 40 min    Core Videos        []Heart Disease Risk Reduction       []Overview of Pritikin Eating Plan      []Move it          []Calorie Density         []Healthy Minds, Bodies, Hearts        []Label Reading - Part 1       []Metabolic Syndrome and Belly Fat        []How Our Thoughts Can Heal Our Hearts   []Dining Out - Part 1      []Biomechanical Limitations      Exercise      Healthy Mind-Set  []Improving Performance    []Smoking Cessation    []Introduction to [address]  []Aging-Enhancing the Quality of Your Life  []Becoming a Pritikin   []Biology of Weight Control    []Cooking Breakfasts   []Decoding Lab Results    and Snacks  []Diseases of Our Time - Overview   []Cooking Dinner and   []Sleep Disorders     Sides  []Targeting Your Nutrition Priorities   []Healthy Salads &   Dressings  Nutrition      []Cooking Soups and   []Dining Out - Part 2     Desserts  []Fueling a Healthy Body   [x]Menu Workshop     Overview  []Planning Your Eating Strategy   []The Pritikin Solution  []Vitamins and Minerals    Comments:  Video completed, group discussion

## 2021-03-05 ENCOUNTER — HOSPITAL ENCOUNTER (OUTPATIENT)
Dept: CARDIAC REHAB | Age: 66
Setting detail: THERAPIES SERIES
Discharge: HOME | End: 2021-03-05
Payer: COMMERCIAL

## 2021-03-05 PROCEDURE — G0422 INTENS CARDIAC REHAB W/EXERC: HCPCS

## 2021-03-05 PROCEDURE — G0423 INTENS CARDIAC REHAB NO EXER: HCPCS

## 2021-03-05 NOTE — PLAN OF CARE
Hospital Facility-Based Program  Phase 2 Cardiac Rehab Weekly Progress Report      Patient prescribed exercise:  2:15 class. 3 times per week in rehab, 1-4 times per week at home for the amount of sessions/weeks specified by insurance. Current Levels: Treadmill: 2. 8mph/0% for 15 minutes, [employer]inn Airdyne: Level 1.3 for 15 minutes,  UBE: Level 0.7 for 5 minutes. Progression Discussion: Maintain Aerobic exercise 15 minutes to work on endurance. Attempt to increase intensity by 5-20% for each modality this week. Try to increase intensities until Robert Montanez rates the exercises a 13-17 on Nacho RPE.

## 2021-03-08 ENCOUNTER — APPOINTMENT (OUTPATIENT)
Dept: CARDIAC REHAB | Age: 66
End: 2021-03-08
Payer: COMMERCIAL

## 2021-03-08 ENCOUNTER — HOSPITAL ENCOUNTER (OUTPATIENT)
Dept: CARDIAC REHAB | Age: 66
Setting detail: THERAPIES SERIES
End: 2021-03-08
Payer: COMMERCIAL

## 2021-03-09 ENCOUNTER — TELEPHONE (OUTPATIENT)
Dept: CARDIOLOGY CLINIC | Age: 66
End: 2021-03-09

## 2021-03-09 DIAGNOSIS — R94.31 ABNORMAL PATIENT-ACTIVATED CARDIAC EVENT MONITOR: ICD-10-CM

## 2021-03-09 DIAGNOSIS — I49.9 IRREGULARLY IRREGULAR HEART RHYTHM: Primary | ICD-10-CM

## 2021-03-09 NOTE — TELEPHONE ENCOUNTER
Reviewed with Dr Kumar:     Verbal order Dr Matilda Villanuevaore:  Please refer to Dr Filomena Sever. MICHELA for pt to return call. Please agree with verbal order.

## 2021-03-10 ENCOUNTER — HOSPITAL ENCOUNTER (OUTPATIENT)
Dept: CARDIAC REHAB | Age: 66
Setting detail: THERAPIES SERIES
Discharge: HOME | End: 2021-03-10
Payer: COMMERCIAL

## 2021-03-10 ENCOUNTER — OFFICE VISIT (OUTPATIENT)
Dept: CARDIOLOGY CLINIC | Age: 66
End: 2021-03-10
Payer: COMMERCIAL

## 2021-03-10 ENCOUNTER — TELEPHONE (OUTPATIENT)
Dept: CARDIOLOGY CLINIC | Age: 66
End: 2021-03-10

## 2021-03-10 VITALS
SYSTOLIC BLOOD PRESSURE: 130 MMHG | WEIGHT: 192 LBS | DIASTOLIC BLOOD PRESSURE: 82 MMHG | BODY MASS INDEX: 30.86 KG/M2 | HEART RATE: 79 BPM | HEIGHT: 66 IN

## 2021-03-10 DIAGNOSIS — R06.02 SOB (SHORTNESS OF BREATH) ON EXERTION: Primary | ICD-10-CM

## 2021-03-10 PROCEDURE — G0422 INTENS CARDIAC REHAB W/EXERC: HCPCS

## 2021-03-10 PROCEDURE — 93000 ELECTROCARDIOGRAM COMPLETE: CPT | Performed by: INTERNAL MEDICINE

## 2021-03-10 PROCEDURE — 99214 OFFICE O/P EST MOD 30 MIN: CPT | Performed by: INTERNAL MEDICINE

## 2021-03-10 PROCEDURE — G0423 INTENS CARDIAC REHAB NO EXER: HCPCS

## 2021-03-10 NOTE — PROGRESS NOTES
cough  Gastrointestinal: Negative for blood in stool, constipation, diarrhea   Endocrine: Negative for cold intolerance, heat intolerance, polydipsia. Genitourinary: Negative for dysuria, enuresis, flank pain and hematuria. Musculoskeletal: Negative for arthralgias, joint swelling and neck pain. Neurological: Negative for numbness and headaches. Psychiatric/Behavioral: Negative for agitation, confusion, decreased concentration and dysphoric mood. PAST MEDICAL HISTORY:  1. CAD/PCI proximal LAD 12/22/20 (abnoral stress). 2. Hypertension and HPL. 3. Isolated PACs (event monitor). 4.  Bifascicular block) right bundle branch block and left posterior fascicular block) with intermittent high-grade block (2:1) AV block noted during Lexiscan and on event monitor (during sleep.). Past Medical History:   Diagnosis Date    Calcium oxalate renal stones 2004    he has undergone ECSW lithotripsy    Coronary artery disease involving native coronary artery of native heart without angina pectoris     Essential hypertension 04/28/2016    GERD (gastroesophageal reflux disease)     Hyperlipidemia 03/2003    hypercholsterolemia    Osteoarthritis     Ulcerative colitis ([facility] [address]        PSH:  Past Surgical History:   Procedure Laterality Date    CARDIAC CATHETERIZATION      COLONOSCOPY      4/2010=surveillance for ulcerative collitis    FINGER TRIGGER RELEASE      KNEE ARTHROSCOPY      ROTATOR CUFF REPAIR      TONSILLECTOMY         FAMILY HISTORY:  Sister had MI in her 46s. Mother had atrial fibrillation. Family History   Problem Relation Age of Onset    Cancer Mother         NHL    Heart Disease Maternal Grandmother         CAD    Diabetes Maternal Grandfather     Heart Disease Paternal Grandfather         MI    Cancer Paternal Grandfather         PROSTATE        SOCIAL HISTORY:  Retired.  with 2 children. Denies smoking and drinks alcohol socially.    Social History     Socioeconomic History    Marital status:      Spouse name: Manisha Avila Number of children: Not on file    Years of education: Not on file    Highest education level: Not on file   Occupational History    Occupation: retired   Social Needs    Financial resource strain: Not on file    Food insecurity     Worry: Not on file     Inability: Not on file   [employer] needs     Medical: Not on file     Non-medical: Not on file   Tobacco Use    Smoking status: Former Smoker     Packs/day: 1.00     Years: 10.00     Pack years: 10.00     Quit date: 3/29/1988     Years since quittin.9    Smokeless tobacco: Current User   Substance and Sexual Activity    Alcohol use: Yes     Comment: 1-2 daily    Drug use: No    Sexual activity: Not on file   Lifestyle    Physical activity     Days per week: Not on file     Minutes per session: Not on file    Stress: Not on file   Relationships    Social connections     Talks on phone: Not on file     Gets together: Not on file     Attends Restoration service: Not on file     Active member of club or organization: Not on file     Attends meetings of clubs or organizations: Not on file     Relationship status: Not on file    Intimate partner violence     Fear of current or ex partner: Not on file     Emotionally abused: Not on file     Physically abused: Not on file     Forced sexual activity: Not on file   Other Topics Concern    Not on file   Social History Narrative    Not on file        ALLERGY HISTORY:  No Known Allergies     MEDICATIONS:  Current Outpatient Medications   Medication Sig Dispense Refill    fluticasone (FLONASE) 50 MCG/ACT nasal spray USE ONE SPRAY IN EACH NOSTRIL DAILY 1 Bottle 3    ticagrelor (BRILINTA) 90 MG TABS tablet Take 1 tablet by mouth 2 times daily 180 tablet 2    metoprolol tartrate (LOPRESSOR) 25 MG tablet Take 0.5 tablets by mouth 2 times daily 90 tablet 2    nitroGLYCERIN (NITROSTAT) 0.4 MG SL tablet up to max of 3 total doses.  If no relief after 1 dose, call 911. 25 tablet 3    levocetirizine (XYZAL) 5 MG tablet Take 1 tablet by mouth nightly 90 tablet 1    hydroCHLOROthiazide (HYDRODIURIL) 25 MG tablet Take 1 tablet by mouth daily 90 tablet 3    losartan (COZAAR) 50 MG tablet Take 1 tablet by mouth daily 90 tablet 3    atorvastatin (LIPITOR) 40 MG tablet Take 1 tablet by mouth daily 90 tablet 3    omeprazole (PRILOSEC) 20 MG delayed release capsule Take 1 capsule by mouth Daily 30 capsule 5    Acetaminophen (TYLENOL) 325 MG CAPS Take by mouth      multivitamin (THERAGRAN) per tablet Take 1 tablet by mouth daily.  aspirin 81 MG EC tablet Take 81 mg by mouth daily. No current facility-administered medications for this visit. PHYSICAL EXAM:  Vitals:    03/10/21 0921   BP: 130/82   Pulse: 79     Body mass index is 30.99 kg/m². GENERAL: Alert and oriented. No distress. EYES: No pallor or icterus. ENT: No central cyanosis. VESSELS: No jugular venous distension or carotid bruits. HEART: Normal S1/S2. No murmur, rub or gallop. LUNGS: Clear to auscultation. ABDOMEN: Soft and non-tender. EXTREMITIES: No lower extremity edema. Feet are warm. NEUROLOGICAL: Grossly intact. LABORATORY DATA AND DIAGNOSTIC DATA:  I have personally reviewed and interpreted the results of the following diagnostic testing    Lab Results   Component Value Date    WBC 4.7 (L) 12/22/2020    HGB 13.9 (L) 12/22/2020    HCT 40.0 (L) 12/22/2020     12/22/2020    CHOL 141 12/22/2020    TRIG 59 12/22/2020    HDL 48 12/22/2020    ALT 24 12/22/2020    AST 25 12/22/2020     12/22/2020    K 3.9 12/22/2020     12/22/2020    CREATININE 0.8 12/22/2020    BUN 18 12/22/2020    CO2 29 12/22/2020    TSH 2.460 05/20/2020    INR 0.93 12/22/2020   Echocardiogram dated 12/22/2020: Normal LVEF 60%. Normal LV wall thickness and cavity size. ECG today: Sinus rhythm.  Normal MA, QRS and QTc interval.  Coronary angiogram 12/22/2020: High grade stenosis proximal LAD (s/p PCI). Event monitor dated 2/8/2021: Isolated premature atrial complexes and right bundle branch block, and high-grade (begin to ease to 1) AV block during sleep. IMPRESSION:  1. Intermittent high-grade AV block during sleep. 2.  Bifascicular block. 3.  Coronary artery disease/PCI to proximal LAD. Denies angina. Preserved left ventricular size and function, EF 60%. 4.  Isolated premature atrial complex, detected on event monitor. 5.  Hypertension, controlled and hyperlipidemia. ASSESSMENT AND RECOMMENDATIONS:  The patient has significant infranodal conduction system disease. He is currently on a small dose of beta-blockers. His high-grade AV block occurred during the sleep. He has preserved left ventricle size and function and has good functional aerobic capacity. I think he will be benefited by long-term monitoring to document any high-grade AV block so bradyarrhythmias while he is awake that would warrant a pacemaker implantation. He has strong indication for beta-blocker use. We will proceed with a loop recorder implantation. The plan discussed the patient and he is in agreement. His questions were answered. He verbalized understanding of the discussion. Thank you for allowing me to participate in the care of your patient. Please call me if you have any questions. **This report has been created using voice recognition software. It may contain minor errors which are inherent in voice recognition technology. **     Electronically signed by Pierre Karimi MD, [facility], Michel Hunt on 3/10/2021 at 9:32 AM

## 2021-03-10 NOTE — LETTER
[facility]  [facility]  [address]  [facility] [address]  Phone: [phone]  Fax: Adrian Martines MD        March 10, 2021    [address] [facility] [address]      Dear Delisa Alexandra:    Your Loop implant is scheduled for 03.15.2021  Arrival time of 9am   1 wk incision check apt-  03.23.2021 at 11:15am      Diet:  Nothing to eat or drink after midnight   Medications: You may continue your current medications   Admission:  Please report to the 2nd floor - [address] [facility],[address] at [facility] [address]. Please bring your actual bottle of medications with you to the hospital.   Bring your photo ID and insurance card      If you have any questions or concerns, please don't hesitate to call.     Sincerely,        Sandra Blount MD

## 2021-03-10 NOTE — PROGRESS NOTES
Video Education Report - ICR/CR  Name:  Andrew Henning     Date:  3/10/2021  MRN: [id number]     Session #:  6  Session Length: 40 min    Core Videos        []Heart Disease Risk Reduction       []Overview of [facility]      []Move it          [x]Calorie Density         []Healthy Minds, Bodies, Hearts        []Label Reading - Part 1       []Metabolic Syndrome and Belly Fat        []How Our Thoughts Can Heal Our Hearts   []Dining Out - Part 1      []Biomechanical Limitations      Exercise      Healthy Mind-Set  []Improving Performance    []Smoking Cessation    []Introduction to [address]  []Aging-Enhancing the Quality of Your Life  []Becoming a Pritikin   []Biology of Weight Control    []Cooking Breakfasts   []Decoding Lab Results    and Snacks  []Diseases of Our Time - Overview   []Cooking Dinner and   []Sleep Disorders     Sides  []Targeting Your Nutrition Priorities   []Healthy Salads &   Dressings  Nutrition      []Cooking Soups and   []Dining Out - Part 2     Desserts  []Fueling a Healthy Body   []Menu Workshop     Overview  []Planning Your Eating Strategy   []The Pritikin Solution  []Vitamins and Minerals    Comments:  Video completed, group discussion

## 2021-03-10 NOTE — TELEPHONE ENCOUNTER
Procedure: loop implant  Date: 03.15.2021   Arrival Time: 9am   Meds to Hold: none    Covid:  testing ordered, to be done: no  1 wk f/u 03.23.21 at 1115am     Instructions verbalized to the patient. Instructions given  to the patient.

## 2021-03-10 NOTE — PROGRESS NOTES
Pt C/O sore chest area, SOB on exertions, HA are few, some fatigue      Pt denies  dizziness, heart palpitations, swelling

## 2021-03-12 ENCOUNTER — HOSPITAL ENCOUNTER (OUTPATIENT)
Dept: CARDIAC REHAB | Age: 66
Setting detail: THERAPIES SERIES
Discharge: HOME | End: 2021-03-12
Payer: COMMERCIAL

## 2021-03-12 ENCOUNTER — HOSPITAL ENCOUNTER (OUTPATIENT)
Dept: CARDIAC REHAB | Age: 66
Setting detail: THERAPIES SERIES
End: 2021-03-12
Payer: COMMERCIAL

## 2021-03-12 NOTE — PLAN OF CARE
Hospital Facility-Based Program  Phase 2 Cardiac Rehab Weekly Progress Report      Patient prescribed exercise:  2:15 class. 3 times per week in rehab, 1-4 times per week at home for the amount of sessions/weeks specified by insurance. Current Levels: Treadmill: 2. 8mph/0% for 15 minutes, Schwinn Airdyne: Level 1.4 for 15 minutes, UBE: Level 0.7 for 5 minutes. Progression Discussion: Maintain Aerobic exercise 15 minutes to work on endurance. Attempt to increase intensity by 5-20% for each modality this week. Try to increase intensities until Parish Napoles rates the exercises a 13-17 on Nacho RPE.

## 2021-03-13 ENCOUNTER — PREP FOR PROCEDURE (OUTPATIENT)
Dept: CARDIOLOGY | Age: 66
End: 2021-03-13

## 2021-03-13 RX ORDER — SODIUM CHLORIDE 0.9 % (FLUSH) 0.9 %
10 SYRINGE (ML) INJECTION PRN
Status: CANCELLED | OUTPATIENT
Start: 2021-03-13

## 2021-03-13 RX ORDER — SODIUM CHLORIDE 0.9 % (FLUSH) 0.9 %
10 SYRINGE (ML) INJECTION EVERY 12 HOURS SCHEDULED
Status: CANCELLED | OUTPATIENT
Start: 2021-03-13

## 2021-03-13 RX ORDER — CHLORHEXIDINE GLUCONATE 4 G/100ML
SOLUTION TOPICAL ONCE
Status: CANCELLED | OUTPATIENT
Start: 2021-03-13 | End: 2021-03-13

## 2021-03-15 ENCOUNTER — APPOINTMENT (OUTPATIENT)
Dept: CARDIAC REHAB | Age: 66
End: 2021-03-15
Payer: COMMERCIAL

## 2021-03-15 ENCOUNTER — HOSPITAL ENCOUNTER (OUTPATIENT)
Dept: INPATIENT UNIT | Age: 66
Discharge: HOME | End: 2021-03-15
Attending: INTERNAL MEDICINE | Admitting: INTERNAL MEDICINE
Payer: COMMERCIAL

## 2021-03-15 VITALS
RESPIRATION RATE: 16 BRPM | HEIGHT: 66 IN | TEMPERATURE: 97.7 F | OXYGEN SATURATION: 97 % | WEIGHT: 192 LBS | BODY MASS INDEX: 30.86 KG/M2 | DIASTOLIC BLOOD PRESSURE: 79 MMHG | SYSTOLIC BLOOD PRESSURE: 122 MMHG | HEART RATE: 71 BPM

## 2021-03-15 LAB
ANION GAP SERPL CALCULATED.3IONS-SCNC: 8 MEQ/L (ref 8–16)
APTT: 30.4 SECONDS (ref 22–38)
BUN BLDV-MCNC: 12 MG/DL (ref 7–22)
CALCIUM SERPL-MCNC: 10.1 MG/DL (ref 8.5–10.5)
CHLORIDE BLD-SCNC: 100 MEQ/L (ref 98–111)
CO2: 32 MEQ/L (ref 23–33)
CREAT SERPL-MCNC: 0.8 MG/DL (ref 0.4–1.2)
ERYTHROCYTE [DISTWIDTH] IN BLOOD BY AUTOMATED COUNT: 11.9 % (ref 11.5–14.5)
ERYTHROCYTE [DISTWIDTH] IN BLOOD BY AUTOMATED COUNT: 41.1 FL (ref 35–45)
GFR SERPL CREATININE-BSD FRML MDRD: > 90 ML/MIN/1.73M2
GLUCOSE BLD-MCNC: 97 MG/DL (ref 70–108)
HCT VFR BLD CALC: 42.7 % (ref 42–52)
HEMOGLOBIN: 14.2 GM/DL (ref 14–18)
INR BLD: 0.97 (ref 0.85–1.13)
MCH RBC QN AUTO: 31.1 PG (ref 26–33)
MCHC RBC AUTO-ENTMCNC: 33.3 GM/DL (ref 32.2–35.5)
MCV RBC AUTO: 93.6 FL (ref 80–94)
PLATELET # BLD: 256 THOU/MM3 (ref 130–400)
PMV BLD AUTO: 8.9 FL (ref 9.4–12.4)
POTASSIUM REFLEX MAGNESIUM: 3.9 MEQ/L (ref 3.5–5.2)
RBC # BLD: 4.56 MILL/MM3 (ref 4.7–6.1)
SODIUM BLD-SCNC: 140 MEQ/L (ref 135–145)
WBC # BLD: 5.2 THOU/MM3 (ref 4.8–10.8)

## 2021-03-15 PROCEDURE — 36415 COLL VENOUS BLD VENIPUNCTURE: CPT

## 2021-03-15 PROCEDURE — 85027 COMPLETE CBC AUTOMATED: CPT

## 2021-03-15 PROCEDURE — 80048 BASIC METABOLIC PNL TOTAL CA: CPT

## 2021-03-15 PROCEDURE — 6360000002 HC RX W HCPCS: Performed by: NURSE PRACTITIONER

## 2021-03-15 PROCEDURE — 85610 PROTHROMBIN TIME: CPT

## 2021-03-15 PROCEDURE — 2580000003 HC RX 258: Performed by: INTERNAL MEDICINE

## 2021-03-15 PROCEDURE — 33285 INSJ SUBQ CAR RHYTHM MNTR: CPT | Performed by: INTERNAL MEDICINE

## 2021-03-15 PROCEDURE — 2709999900 HC NON-CHARGEABLE SUPPLY

## 2021-03-15 PROCEDURE — 2500000003 HC RX 250 WO HCPCS

## 2021-03-15 PROCEDURE — C1764 EVENT RECORDER, CARDIAC: HCPCS

## 2021-03-15 PROCEDURE — 85730 THROMBOPLASTIN TIME PARTIAL: CPT

## 2021-03-15 RX ORDER — CHLORHEXIDINE GLUCONATE 4 G/100ML
SOLUTION TOPICAL ONCE
Status: DISCONTINUED | OUTPATIENT
Start: 2021-03-15 | End: 2021-03-15 | Stop reason: HOSPADM

## 2021-03-15 RX ORDER — SODIUM CHLORIDE 0.9 % (FLUSH) 0.9 %
10 SYRINGE (ML) INJECTION EVERY 12 HOURS SCHEDULED
Status: DISCONTINUED | OUTPATIENT
Start: 2021-03-15 | End: 2021-03-15 | Stop reason: HOSPADM

## 2021-03-15 RX ORDER — SODIUM CHLORIDE 0.9 % (FLUSH) 0.9 %
10 SYRINGE (ML) INJECTION PRN
Status: DISCONTINUED | OUTPATIENT
Start: 2021-03-15 | End: 2021-03-15 | Stop reason: HOSPADM

## 2021-03-15 RX ORDER — SODIUM CHLORIDE 9 MG/ML
INJECTION, SOLUTION INTRAVENOUS CONTINUOUS
Status: DISCONTINUED | OUTPATIENT
Start: 2021-03-15 | End: 2021-03-15 | Stop reason: HOSPADM

## 2021-03-15 RX ADMIN — SODIUM CHLORIDE: 9 INJECTION, SOLUTION INTRAVENOUS at 09:35

## 2021-03-15 RX ADMIN — CEFAZOLIN 2000 MG: 10 INJECTION, POWDER, FOR SOLUTION INTRAVENOUS at 09:38

## 2021-03-15 NOTE — PROGRESS NOTES
Patient admitted to 2E17  Ambulatory for loop implant. Patient NPO. Patient unaccompanied. Vital signs obtained. Assessment and data collection intiated. Oriented to room. Policies and procedures for  explained. All questions answered with no further questions at this time. Fall prevention and safety precautions discussed with patient.

## 2021-03-15 NOTE — PRE SEDATION
Sedation Pre-Procedure Note    Patient Name: Phu Route   YOB: 1955  Room/Bed: -17/017-A  Medical Record Number: [id number]  Date: 3/15/2021   Time: 11:55 AM       Indication:  Intermittent high grade AV block planned for loop recorder. Consent: I have discussed with the patient and/or the patient representative the indication, alternatives, and the possible risks and/or complications of the planned procedure and the anesthesia methods. The patient and/or patient representative appear to understand and agree to proceed. Vital Signs:   Vitals:    03/15/21 0907   BP: 127/78   Pulse: 78   Resp: 14   Temp: 97.7 °F (36.5 °C)       Past Medical History:   has a past medical history of Calcium oxalate renal stones, Coronary artery disease involving native coronary artery of native heart without angina pectoris, Essential hypertension, GERD (gastroesophageal reflux disease), Hyperlipidemia, Osteoarthritis, and Ulcerative colitis ([facility] [address] Past Surgical History:   has a past surgical history that includes Colonoscopy; Cardiac catheterization; Rotator cuff repair; Knee arthroscopy; Finger trigger release; and Tonsillectomy. Medications:   Scheduled Meds:    chlorhexidine   Topical Once    sodium chloride flush  10 mL Intravenous 2 times per day     Continuous Infusions:    sodium chloride 50 mL/hr at 03/15/21 0935     PRN Meds: sodium chloride flush  Home Meds:   Prior to Admission medications    Medication Sig Start Date End Date Taking?  Authorizing Provider   fluticasone (FLONASE) 50 MCG/ACT nasal spray USE ONE SPRAY IN EACH NOSTRIL DAILY 2/10/21  Yes Roseann Milton MD   ticagrelor (BRILINTA) 90 MG TABS tablet Take 1 tablet by mouth 2 times daily 1/21/21  Yes Shayla Bullard MD   metoprolol tartrate (LOPRESSOR) 25 MG tablet Take 0.5 tablets by mouth 2 times daily 1/21/21  Yes Shayla Bullard MD   levocetirizine (XYZAL) 5 MG tablet Take 1 tablet by mouth nightly 11/17/20  Yes Roseann Milton MD hydroCHLOROthiazide (HYDRODIURIL) 25 MG tablet Take 1 tablet by mouth daily 11/17/20  Yes Colt Raman MD   losartan (COZAAR) 50 MG tablet Take 1 tablet by mouth daily 11/17/20  Yes Colt Raman MD   atorvastatin (LIPITOR) 40 MG tablet Take 1 tablet by mouth daily 11/17/20  Yes Colt Raman MD   omeprazole (PRILOSEC) 20 MG delayed release capsule Take 1 capsule by mouth Daily 8/27/20  Yes Colt Raman MD   Acetaminophen (TYLENOL) 325 MG CAPS Take by mouth   Yes Historical Provider, MD   multivitamin [facility]) per tablet Take 1 tablet by mouth daily. Yes Historical Provider, MD   aspirin 81 MG EC tablet Take 81 mg by mouth daily. Yes Historical Provider, MD   nitroGLYCERIN (NITROSTAT) 0.4 MG SL tablet up to max of 3 total doses. If no relief after 1 dose, call 911. 12/22/20   Thania Arora MD     Coumadin Use Last 7 Days:  no  Antiplatelet drug therapy use last 7 days: Yes  Other anticoagulant use last 7 days: no  Additional Medication Information:  Per medical records      Pre-Sedation Documentation and Exam:   Vital signs have been reviewed (see flow sheet for vitals).     Mallampati Airway Assessment:  Mallampati Class II - (soft palate, fauces & uvula are visible)    Prior History of Anesthesia Complications:   none    ASA Classification:  Class 3 - A patient with severe systemic disease that limits activity but is not incapacitating    Sedation/ Anesthesia Plan:   intravenous sedation    Medications Planned:   midazolam (Versed) intravenously and fentanyl intravenously    Patient is an appropriate candidate for plan of sedation: yes    Electronically signed by Maribell Marin MD on 3/15/2021 at 11:55 AM

## 2021-03-15 NOTE — PROGRESS NOTES
Set up for LINQ insertion completed. Time-out completed. Pt. Denies any discomfort. Prepped and draped by Mikel Wiggins RT. Lidocaine 10 ml SQ in left upper chest per Dr. Sean Argueta. LINQ was injected by Dr. Sean Argueta. Suture, steri-strips and tegaderm dressing applied. Pt denied any discomfort during and after procedure.

## 2021-03-15 NOTE — PROCEDURES
[address] [facility]  Dept: [phone]      CARDIAC ELECTROPHYSIOLOGY: PROCEDURE NOTE  PATIENT DEMOGRAPHICS:  Date:   3/15/2021  Patient name:              León Crawley  YOB: 1955  Sex: male   MRN:   [id number]    PRIMARY CARE PROVIDER:     Ernie Caicedo MD     PROCEDURE PLANNED:  Loop recorder implantation. INDICATION FOR PROCEDURE:  Intermittent high grade AV block (while asleep) with baseline bifascicular block. PROCEDURE PERFORMED:  Loop recorder implantation. DESCRIPTION OF THE PROCEDURE:  The patient was brought to the cardiac catheterization lab in A fasting and non-sedated state. He was prepped and draped in the usual sterile fashion. Lidocaine, 2% was injected into the left parasternal area in the region of the fourth intercostal space for local anesthesia. Using the provided insertion tool, 1 cm incision was mode in skin and subcutaneous tunnel created. The device was then injected into the subcutaneous pocket using company provided tool. The incision was then closed with 4-0 vicryl suture and site covered with steri-strips and a light pressure dressing. The device was checked and good atrial and ventricular electrograms visualized. The patient tolerated the procedure well. MEDICATIONS:  1. Cefazolin 2 gm intravenously. 2. Lidocaine/Marcaine 15 cc. BLOOD LOSS:  Minimal.     COMPLICATIONS:  None. IDEVICE PARAMETERS:    1. Medtronic Reveal Phoenix, model [id number], serial #[id number]. 2. Position:  Left parasternal area. 3. Measured R wave: 0.10 mV  4. Programmed sensitivity:  0.035 millivolts    SETTINGS:  1. Tachycardia, 167 beats per minute for 16 beats. 2. Bradycardia, 40 beats per minute for 4 beats. 3. Pauses more than 3 seconds. 4. AT/AF:  ON.    SUMMARY:  Successful loop recorder implantation. RECOMMENDATIONS:  1. Follow-up in device clinic in 1 week.   2. Tylenol 625 mg every 6 hours as needed for pain. 3. Post operative care per protocol.        Electronically signed by Herbie Cadet MD, Amanda Betancourt on 3/15/2021 at 1:05 PM

## 2021-03-17 ENCOUNTER — HOSPITAL ENCOUNTER (OUTPATIENT)
Dept: CARDIAC REHAB | Age: 66
Setting detail: THERAPIES SERIES
Discharge: HOME | End: 2021-03-17
Payer: COMMERCIAL

## 2021-03-17 PROCEDURE — G0423 INTENS CARDIAC REHAB NO EXER: HCPCS

## 2021-03-17 PROCEDURE — G0422 INTENS CARDIAC REHAB W/EXERC: HCPCS

## 2021-03-17 NOTE — PROGRESS NOTES
Tawny MORROW.:  1955    Acct Number: [de-identified]   MRN:  [id number]                             [facility] HEALTHY MIND-SET WORKSHOP             Date: 3/17/2021        Session #    Todays class covered:    ( )  Stress and Health Workshop:  [facility] patient will learn about the body's adaptive response that is triggered by a variety of stressors. Patient will gain insight into the toll that chronic stress takes on their health, both emotionally and physically. (x) Taking Charge of Stress Workshop:  [facility] patient will learn and practice a variety of stress management techniques. Patient will be able to effectively apply coping mechanisms in perceived stressful situations. ( ) New Thoughts New Behaviors Workshop: [facility] patient will learn and practice techniques for developing effective health and lifestyle goals. Patient will be able to effectively apply the goal setting process learned to develop at least one new personal goal.     ( ) Managing Moods & Relationships Workshop:  [facility] patient will learn how emotional and chronic stress factors can impact their hearts. They will learn healthy ways to handle stress and utilize positive coping mechanisms. In addition, [facility] patient will learn ways to improve communication skills. Mart Small actively participated and verbalized understanding. Total time in the Healthy Mind-Set class was 45 minutes.     Electronically signed by Wolfgang Edmond RN on 3/17/2021 at 2:26 PM

## 2021-03-18 ENCOUNTER — TELEPHONE (OUTPATIENT)
Dept: CARDIOLOGY CLINIC | Age: 66
End: 2021-03-18

## 2021-03-18 NOTE — TELEPHONE ENCOUNTER
DR SUH, THIS PT JUST HAD THIS LINQ RECORDER IMPLANTED. PLEASE LOOK AT THE EPISODE ON MARCH 17TH. IT LOOKS LIKE IT COULD BE SVT OR NEW ONSET AFIB ??   ALSO HAD AN EPISODE OF TAMRA

## 2021-03-19 ENCOUNTER — HOSPITAL ENCOUNTER (OUTPATIENT)
Dept: CARDIAC REHAB | Age: 66
Setting detail: THERAPIES SERIES
Discharge: HOME | End: 2021-03-19
Payer: COMMERCIAL

## 2021-03-19 PROCEDURE — G0423 INTENS CARDIAC REHAB NO EXER: HCPCS

## 2021-03-19 PROCEDURE — G0422 INTENS CARDIAC REHAB W/EXERC: HCPCS

## 2021-03-19 NOTE — PLAN OF CARE
Hospital Facility-Based Program  Phase 2 Cardiac Rehab Weekly Progress Report      Patient prescribed exercise:  2:15 class. 3 times per week in rehab, 1-4 times per week at home for the amount of sessions/weeks specified by insurance. Current Levels: Treadmill: 2. 8mph/1% for 15 minutes, Schwinn Airdyne: Level 1.4 for 15 minutes,  UBE: Level 0.7 for 5 minutes. Progression Discussion: Maintain Aerobic exercise 15 minutes to work on endurance. Attempt to increase intensity by 5-20% for each modality this week. Try to increase intensities until Vic Pritchett rates the exercises a 13-17 on Nacho RPE.

## 2021-03-22 ENCOUNTER — HOSPITAL ENCOUNTER (OUTPATIENT)
Dept: CARDIAC REHAB | Age: 66
Setting detail: THERAPIES SERIES
Discharge: HOME | End: 2021-03-22
Payer: COMMERCIAL

## 2021-03-22 PROCEDURE — G0422 INTENS CARDIAC REHAB W/EXERC: HCPCS

## 2021-03-22 PROCEDURE — G0423 INTENS CARDIAC REHAB NO EXER: HCPCS

## 2021-03-22 NOTE — PROGRESS NOTES
Jess Mann YOB: 1955    Acct Number: [de-identified]   MRN:  [id number]                             [facility] NUTRITION 1:1 Counseling             Date: 3/22/2021       Session # _______     Violet Yo class covered:    1:1 Counseling Session  Receptiveness to education/goals: ( x) Agreeable ( ) No Interest   ( ) Refused  Evaluation of education:  ( x) Indicates understanding   ( ) Needs reinforcement     () Unsuccessful     Readiness to change:    ( ) Pre-contemplative   ( ) Contemplative - ambivalent about change    ( ) Action  ready to set action plan and implement   (x ) Maintenance  has made change and is trying, and or practicing different alternative behaviors     GOALS:  1. Weight loss. Goal ~180 lbs or below. Focus on lower calorie foods. Discussed portion sizes. 2. Continue with ADLs as he wishes. Wants to get back to work & travel. Continues with exercise & stretching. Leonard Jimenez reports making some changes since starting [facility] including weight loss of ~15 or 20 lbs since getting his stent. Reports intentionally limiting portion sizes and is being intentional about moving most days. Continues to use whole grain products and skim milk. Intentionally not snacking. Has been reading labels. Exercises 4 days/wk in the morning. Bike & elliptical ~30 min/d + 30 min on treadmill. Food Recall:  Breakfast:  Coffee and banana,   Lunch:   flat bread, lettuce, green onions, sweet pepper & onions  Dinner:  Salad small portion salad dressing and left over potatoes, cabbage, onions, from a roast  Snacks:  Carrots & celery 1 tsp Peanut butter,  Main Beverages:  Water (4-5 16 oz) with crystal light packets, 6 oz OJ, coffee in AM, 3-4 pops/week      Lenoard Jimenez was counseled by the Dietitian for 45 minutes. Motivational Interviewing was used to help promote change. Patient voiced understanding.      Electronically signed by Liz MOLINA [address]  on 3/22/2021 at 12:28 PM

## 2021-03-22 NOTE — PROGRESS NOTES
Felipa BRUCEO.B.:  1955    Acct Number: [de-identified]   MRN:  [id number]                             [facility] NUTRITION WORKSHOP             Date: 3/22/2021        Session # _______    Katharina Eisenmenger class covered:    ()  Label Reading  ()  Menus  (x)  Targeting Nutrition Priorities  ()  Fueling a Healthy Body    Readiness to change:    ( ) Pre-contemplative   ( ) Contemplative - ambivalent about change    ( ) Action  ready to set action plan and implement   (x ) Maintenance  has made change and is trying, and or practicing different alternative behaviors       Elodia che was in the Workshop with the Dietitian for 45 minutes. The content was presented via Powerpoint, lecture, and patient participation based format. Motivational interviewing was utilized when needed, to promote change. Patient voiced understanding.     Electronically signed by Edgardo Aguirre RD [address] on 3/22/2021 at 2:55 PM

## 2021-03-23 ENCOUNTER — NURSE ONLY (OUTPATIENT)
Dept: CARDIOLOGY CLINIC | Age: 66
End: 2021-03-23

## 2021-03-23 DIAGNOSIS — R00.1 BRADYCARDIA: Primary | ICD-10-CM

## 2021-03-23 NOTE — TELEPHONE ENCOUNTER
Dr Zapata How, please see DR Ashvin Mitchell note below .  He is on Brilinta / of course not for afib   DOES HE NEED ANTI COAGS OR JUST MONITOR ???

## 2021-03-23 NOTE — PROGRESS NOTES
medtronic linq loop recorder     Bradycardia, was recently fitted for cpap machine. States he is wearing it     Bradycardia     Strips under media tap  Will cont. To monitor      steri strips intact, minor puffiness, shadow of bruising, no drainage, tender to touch.     If, at any point, there is any increased redness, swelling, increased discomfort, fever, or the incision opens up, the patient is aware to go to the emergency room

## 2021-03-24 ENCOUNTER — HOSPITAL ENCOUNTER (OUTPATIENT)
Dept: CARDIAC REHAB | Age: 66
Setting detail: THERAPIES SERIES
Discharge: HOME | End: 2021-03-24
Payer: COMMERCIAL

## 2021-03-24 PROCEDURE — G0423 INTENS CARDIAC REHAB NO EXER: HCPCS

## 2021-03-24 PROCEDURE — G0422 INTENS CARDIAC REHAB W/EXERC: HCPCS

## 2021-03-26 ENCOUNTER — HOSPITAL ENCOUNTER (OUTPATIENT)
Dept: CARDIAC REHAB | Age: 66
Setting detail: THERAPIES SERIES
Discharge: HOME | End: 2021-03-26
Payer: COMMERCIAL

## 2021-03-26 ENCOUNTER — HOSPITAL ENCOUNTER (OUTPATIENT)
Dept: CARDIAC REHAB | Age: 66
Setting detail: THERAPIES SERIES
End: 2021-03-26
Payer: COMMERCIAL

## 2021-03-26 ENCOUNTER — TELEPHONE (OUTPATIENT)
Dept: CARDIOLOGY CLINIC | Age: 66
End: 2021-03-26

## 2021-03-26 NOTE — TELEPHONE ENCOUNTER
[facility]     Short run [facility]

## 2021-03-26 NOTE — PLAN OF CARE
Hospital Facility-Based Program  Phase 2 Cardiac Rehab Weekly Progress Report      Patient prescribed exercise:  2:15 class. 3 times per week in rehab, 1-4 times per week at home for the amount of sessions/weeks specified by insurance. Current Levels: Treadmill: 2. 8mph/2% for 15 minutes, Schwinn Airdyne: Level 1.7 for 15 minutes,  UBE: Level 0.8 for 5 minutes. Progression Discussion: Maintain Aerobic exercise 15 minutes to work on endurance. Attempt to increase intensity by 5-20% for each modality this week. Try to increase intensities until Derian Urias rates the exercises a 13-17 on Nacho RPE.

## 2021-03-29 ENCOUNTER — HOSPITAL ENCOUNTER (OUTPATIENT)
Dept: CARDIAC REHAB | Age: 66
Setting detail: THERAPIES SERIES
Discharge: HOME | End: 2021-03-29
Payer: COMMERCIAL

## 2021-03-29 PROCEDURE — G0423 INTENS CARDIAC REHAB NO EXER: HCPCS

## 2021-03-29 PROCEDURE — G0422 INTENS CARDIAC REHAB W/EXERC: HCPCS

## 2021-03-29 NOTE — PROGRESS NOTES
Zen MORROW.:  [id number]    MRN:  [id number]    Date: 3/29/2021      Session Length:  31 min   Session # _______    EXERCISE WORKSHOP:  Balance Training and Fall Prevention                        Todays class addressed the importance of patient's sensorimotor skills (vision, proprioception, and the vestibular system) in maintaining their ability to balance at any age. Reviewed a variety of balancing and strength training exercises that are appropriate for patient's current level of function. Reviewed common causes of poor balance, possible solutions to these problems, and ways to modify the physical environment in order to minimize fall risks. Readiness to change:    ( ) Pre-contemplative   ( ) Contemplative - ambivalent about change    (x ) Action  ready to set action plan and implement   ( ) Maintenance  has made change and is trying, and or practicing different alternative behaviors     Additional Notes:      Sade Barrett was in the Workshop with the Exercise Physiologist for 31 minutes. The content was presented via Powerpoint, lecture, and patient participation based format. Motivational interviewing was utilized when needed, to promote change. Patient voiced understanding.     Electronically signed by Evelyn Vidal on 3/29/2021 at 2:32 PM

## 2021-03-30 ENCOUNTER — TELEPHONE (OUTPATIENT)
Dept: CARDIOLOGY CLINIC | Age: 66
End: 2021-03-30

## 2021-03-30 NOTE — TELEPHONE ENCOUNTER
Patient called into the office questioning the if we had gotten a precert for the 14 day monitor and the loop implant. Patient states that the bill EOB states that the [employer]  (14day) [employer] is out of network.  (services provided by [employer])     The loop implant did not require a prior auth.

## 2021-03-31 ENCOUNTER — HOSPITAL ENCOUNTER (OUTPATIENT)
Dept: CARDIAC REHAB | Age: 66
Setting detail: THERAPIES SERIES
Discharge: HOME | End: 2021-03-31
Payer: COMMERCIAL

## 2021-03-31 PROCEDURE — G0422 INTENS CARDIAC REHAB W/EXERC: HCPCS

## 2021-03-31 PROCEDURE — G0423 INTENS CARDIAC REHAB NO EXER: HCPCS

## 2021-03-31 NOTE — TELEPHONE ENCOUNTER
Patient notified event monitor [employer] portion will be resubmitted per Isauro Stubbs with [employer]. Takes about 45 days but should be covers. Per Nelsy Harris is considering it out of network but will address the issues. Patient voiced understanding.

## 2021-03-31 NOTE — PROGRESS NOTES
Video Education Report - ICR/CR  Name:  Sunday Osorio     Date:  3/31/2021  MRN: [id number]     Session #:  25  Session Length: 40 min    Core Videos        []Heart Disease Risk Reduction       []Overview of Pritikin Eating Plan      []Move it          []Calorie Density         []Healthy Minds, Bodies, Hearts        []Label Reading - Part 1       []Metabolic Syndrome and Belly Fat        []How Our Thoughts Can Heal Our Hearts   []Dining Out - Part 1      []Biomechanical Limitations  [x]Facts on Fat            Exercise      Healthy Mind-Set  []Improving Performance    []Smoking Cessation    []Introduction to [address]  []Aging-Enhancing the Quality of Your Life  []Becoming a Pritikin   []Biology of Weight Control    []Cooking Breakfasts   []Decoding Lab Results    and Snacks  []Diseases of Our Time - Overview   []Cooking Dinner and   []Sleep Disorders     Sides  []Targeting Your Nutrition Priorities   []Healthy Salads &   Dressings  Nutrition      []Cooking Soups and   []Dining Out - Part 2     Desserts  []Fueling a Healthy Body   []Menu Workshop     Overview  []Planning Your Eating Strategy   []The Pritikin Solution  []Vitamins and Minerals    Comments:  Video completed, group discussion

## 2021-03-31 NOTE — PLAN OF CARE
Preparation  [] Action  [] Maintenance  [] Relapse [] Pre Contemplation  [] Contemplation  [] Preparation  [] Action  [] Maintenance  [] Relapse [] Pre Contemplation  [] Contemplation  [] Preparation  [] Action  [] Maintenance  [] Relapse   EXERCISE ASSESSMENT EXERCISE ASSESSMENT EXERCISE ASSESSMENT EXERCISE ASSESSMENT EXERCISE ASSESSMENT EXERCISE ASSESSMENT   6 Min Walk Test  Distance walked:   0.26 miles  1372 ft.  3 METs  Max HR:88 BPM      RPE:  13    THR:  107-126  Rhythm:  NSR     6 Min Walk Test  Distance walked:   ** miles  ** ft  ** METs  Max HR:** BPM      RPE:  **  %Change ft= **    Rhythm:  **   DASI: 8.9 METs DASI: 8.9 METs DASI:  METs DASI: ** METs DASI: ** METs DASI: ** METs   Return to Work  [employer] on returning to work? [] Yes              [] No   [] Disabled     [x] Retired     Return to work:  Has Pieter Joey returned to work? [] Yes    [x] No     Return to work:  Has Pieter Joey returned to work? [] Yes    [x] No     Return to work:  Has Pieter Joey returned to work? [] Yes    [] No    Return to work date set? [] Yes, **    [] No    Pieter Joey is doing ** at work. Return to work:  Has Pieter Joey returned to work? [] Yes    [] No    Return to work date set? [] Yes, **    [] No    Pieter Joey is doing ** at work. Return to work:  Has Pieter Joey returned to work? [] Yes    [] No    Return to work? [] Yes, **    [] No    *Required MET Level achieved for job duties? [] Yes    [] No   Orthopedic Limitations/  [] Yes    [x] No       Orthopedic Limitations  *If patient has orthopedic issue:   Actions/  accomodations needed to make Pieter Joey successful :na Orthopedic Limitations adjust to comfort   Orthopedic Limitations   Orthopedic Limitations Orthopedic Limitations     Fall Risk  Fall risk assessed? [x] Yes      [] No    Balance Issues?   [] Yes      [x] No     [] Walker [] Ollie Rhoades    [x] Safety issues reviewed      Fall Risk  *If patient is a fall risk, action needed to accommodate: na  Fall Risk na Fall Risk Fall Risk Fall Risk   Home Exercise  [x] Yes    [] No  Type: bike, TM, elliptical  Frequency: 4 x per week  Duration: 60 min Home Exercise  [x] Yes    [] No  Type: Bike, TM and ellipitcal  Frequency:4x week  Duration: 60 min Home Exercise  [x] Yes    [] No  Type: TM and ellipitcal  Frequency: 4 x week  Duration: 60 Home Exercise  [] Yes    [] No  Type: **  Frequency: **  Duration: ** Home Exercise  [] Yes    [] No  Type: **  Frequency: **  Duration: ** Home Exercise  [] Yes    [] No  Type: **  Frequency: **  Duration: **   Angina with Activity? [] Yes    [x] No  Angina Management: na Angina with Activity? [x] Yes    [] No  Angina Management: states had only once when had monitor on. Rested Angina with Activity? [x] Yes    [] No  Angina Management: rest Angina with Activity? [] Yes    [] No  Angina Management: ** Angina with Activity? [] Yes    [] No  Angina Management: ** Angina with Activity?   [] Yes    [] No  Angina Management: **   EXERCISE PLAN EXERCISE PLAN EXERCISE PLAN EXERCISE PLAN EXERCISE PLAN EXERCISE PLAN   *Interventions* *Interventions* *Interventions* *Interventions* *Interventions* *Interventions*   Exercise Prescription  (per physician & CR staff) Exercise Prescription  (per physician & CR staff) Exercise Prescription  (per physician & CR staff) Exercise Prescription  (per physician & CR staff) Exercise Prescription  (per physician & CR staff) Exercise Prescription  (per physician & CR staff)   Cardiovascular Cardiovascular Cardiovascular Cardiovascular Cardiovascular Cardiovascular   Mode:    [x] Treadmill (TM)  [x] Schwinn Airdyne (AD)  [x] Arms Ergometer (AE)  [] NuStep  [] Elliptical (E) MODE:    [x] Treadmill (TM)  [x] Schwinn Airdyne (AD)  [x] Arms Ergometer (AE)  [] NuStep  [] Elliptical (E) MODE:    [x] Treadmill (TM)  [x] Schwinn Airdyne (AD)  [x] Arms Ergometer (AE)  [] NuStep  [] Elliptical (E) MODE:    [] Treadmill (TM)  [] Schwinn Airdyne (AD)  [] Arms Ergometer (AE)  [] NuStep  [] Elliptical (E) MODE:    [] Treadmill (TM)  [] Schwinn Airdyne (AD)  [] Arms Ergometer (AE)  [] NuStep  [] Elliptical (E) MODE:    [] Treadmill (TM)  [] Schwinn Airdyne (AD)  [] Arms Ergometer (AE)  [] NuStep  [] Elliptical (E)   Initial Workloads  TM: [email or web address] 3 METs  AD: 1.0 level = 3 METs    AE: 0.5 level = 2 METs Current Workloads  TM: 2.8 @ %=3.2  METs  AD:1.2  level = 2.9 METs  AE:0.6  level = 2 METs Current Workloads  TM: 2.8 @3 %= 4.3 METs  AD: 1.7 level = 3.7 METs  AE:0.8  level = 2.3 METs Current Workloads  TM:  @ %=  METs  AD:  level =  METs  NS:   Wetzel=  METs  AE:  level =  METs Current Workloads  TM:  @ %=  METs  AD:  level =  METs  NS:   Wetzel=  METs  AE:  level =  METs Current Workloads  TM:  @ %=  METs  AD:  level =  METs  NS:   Wetzel=  METs  AE:  level =  METs     Frequency:    ICR: 3x/week  Home: 2-3x/wk Frequency:   ICR: 3x/week  Home: 3x/wk Frequency:  ICR: 3x/week  Home: 3-4x/wk Frequency:  ICR: 3x/week  Home: 3-4x/wk Frequency:  ICR: 3x/week  Home: 3-4x/wk Frequency:   Parents will continue exercise at  5-7 days/week   Duration:   Total aerobic exercise = 30-45 min    5-8 min/mode Duration:  Total aerobic exercises = 29 min     12 min/mode Duration:  Total aerobic exercises = 35 min     15 min/mode Duration:  Total aerobic exercises = ** min     **min/mode Duration:  Total aerobic exercises = ** min     **min/mode Duration:  Total erobic exercise =  60-90 min   Intensity:   MET Level = 3  RPE = 12-15 Intensity:  Max MET Level = 3.2  RPE = 12-15 Intensity:  Max MET Level = 4.3  RPE = 12-15 Intensity:  Max MET Level = **  RPE = 12-15 Intensity:  Max MET Level = **  RPE = 12-15 Intensity:  Max MET Level = ** RPE = 12-15   Progression: increase aerobic activity up to 15 min over next 4 weeks by increasing time 2-3 min/week.  Progression:Increase to 15 min then increase METS 5-10% over next 4 weeks   Progression: Increase METS 5-10 % over next 4 weeks   Progression: Progression: Progression:  Increase days/week for ** min on days not in rehab. Home Exercise  *Freddy verbalizes planning to ** ** days/week for ** min on days not in rehab. Home Exercise  *Thanh Rule his/her plan to ** ** days/week for ** min @ **   *Education* *Education* *Education* *Education* *Education* *Education*   RPE Scale  [x] Yes      [] No  Exercise Safety  [x] Yes      [] No  Equipment Orientation  [x] Yes      [] No  S/S to Report  [x] Yes      [] No  Warm Up/Cool Down  [x] Yes      [] No  Home Exercise  [x] Yes      [] No     All Exercise Education Completed  [] Yes      [] No   Exercise Education Recommended    Workshops  [x] Improving Performance  [x] Balance Training & Fall Prevention  [x] Intro to Yoga - Video  [x] Resistance Training & Core Strength Exercise Education Attended/Date  2/15 core strength Exercise Education Attended/Date  3/29 balance Exercise Education Attended/Date Exercise Education Attended/Date All Sessions Completed?     [] Yes  [] No   *Goals* *Goals* *Goals* *Goals* *Goals* *Goals*   Initial Exercise Goals Exercise Goals  Exercise Goals   Exercise Goals  Exercise Goals  Exercise Goals   Freddy plans to:  [x] Attend exercise sessions 3x/wk  [x] initiate home exercise 2-3x/wk for 10-20 min  [x] Increase 6 min walk distance by 10%  [x] Attend Exercise workshops Tremayne Decker plans to:  [x] Attend exercise sessions 3x/wk  [x] continue home exercise 2-3x/wk for 20-30 min  [x] Attend Exercise workshops Freddy's plans to:  [x] Attend exercise sessions 3x/wk  [x] continue home exercise 3-4x/wk for 30-45 min  [x] Determine plan of exercise following rehab  [x] Attend Exercise workshops Freddy's plans to:  [x] Attend exercise sessions 3x/wk  [x] continue home exercise 3-4x/wk for 30-45 min  [x] Determine plan of exercise following rehab  [x] Attend Exercise workshops Freddy's plans to:  [x] Attend exercise sessions 3x/wk  [x] continue home exercise 3-4x/wk for 30-45 min  [x] Determine plan of exercise following rehab  [x] Attend Exercise workshops Ellie Edwards achieved exercise goals? []  Yes    [] No  If no, why?  **  [] Increased 6 min walk distance by 10%  [] Currently exercising 30-60 min/day, 5-7days/wk   [] Plans to continue exercise on own  [] Plans to join a local fitness center to continue exercise  [] Does not plan to continue to exercise after rehab   Return to ADL or Hobbies:   Parents would like to improve strength and endurance so he is able to return to hunt, cross bow, shot gun, household chores Return to ADL or Hobbies:   Parents would like to improve strength and endurance so he is able to return to shot gun Return to ADL or Hobbies:   Parents would like to improve strength and endurance so he is able to return to hunting. Return to ADL or Hobbies:   Parents would like to improve strength and endurance so he/she is able to return to ** Return to ADL or Hobbies:   Parents would like to improve strength and endurance so he/she is able to return to ** Return to ADL or Hobbies:   Parents would like to improve strength and endurance so he/she is able to return to **    *MET level required for above goal:  6-7 METs MET level Achieved:  3.2 METs MET level Achieved:  4. 3METs MET level Achieved:  **METs MET level Achieved:  **METs MET level Achieved:  **METs     Individual Cardiac Treatment Plan - Nutrition  NUTRITION  ASSESSMENT/PLAN NUTRITION  REASSESSMENT NUTRITION   REASSESSMENT NUTRITION   REASSESSMENT NUTRITION  DISCHARGE/FOLLOW-UP NUTRITION  DISCHARGE/FOLLOW-UP   Stages of Change Stages of Change Stages of Change Stages of Change Stages of Change Stages of Change   [x] Pre Contemplation  [] Contemplation  [] Preparation  [] Action  [] Maintenance  [] Relapse [] Pre Contemplation  [x] Contemplation  [] Preparation  [] Action  [] Maintenance  [] Relapse [] Pre Contemplation  [] Contemplation  [x] Preparation  [] Action  [] Maintenance  [] Relapse [] Pre Contemplation  [] Contemplation  [] Preparation  [] Action  [] Maintenance  [] Relapse [] Pre Contemplation  [] Contemplation  [] Preparation  [] Action  [] Maintenance  [] Relapse [] Pre Contemplation  [] Contemplation  [] Preparation  [] Action  [] Maintenance  [] Relapse   NUTRITION ASSESSMENT NUTRITION ASSESSMENT NUTRITION ASSESSMENT NUTRITION ASSESSMENT NUTRITION ASSESSMENT NUTRITION ASSESSMENT   Weight Management  Weight: 195.8        Height: 66\"   BMI: 31.7  Weight Management  Weight: 195.8                  Weight Management  Weight: 195.8                Weight Management  Weight: ** Weight Management  Weight: ** Weight Management  Weight: **                    BMI: **   Eating Plan  Current eating habits: nothing in particular, portion control Eating Plan  Changes:less salt portion control Eating Plan  Changes:portion control and less sodium Eating Plan  Changes: Eating Plan  Changes: Eating Plan Improvements:   Alcohol Use  [] none          [] daily  [x] weekly      [] special   Type: beer, wiskey  Amount: 4+        Diet Assessment Tool:  RATE YOUR PLATE  *Given to patient to complete and return. Diet Assessment Tool:    Score:45 /69        Diet Assessment Tool: RATE YOUR PLATE  Score: **/43   NUTRITION PLAN NUTRITION PLAN NUTRITION PLAN NUTRITION PLAN NUTRITION PLAN NUTRITION PLAN   *Interventions* *Interventions* *Interventions* *Interventions* *Interventions* *Interventions*   Initial Survey given Goal Setting Discussion:   [x] Yes      [] No        Follow Up Survey Reviewed & Goals Updated:     Professional Referral  Please check if needed. [] Dietitian Consult   [] Wt. Management Referral  [] Other:  Professional Referral  Please check if needed. [] Dietitian Consult   [] Wt. Management Referral  [] Other: Professional Referral  Please check if needed. [] Dietitian Consult   [] Wt. Management Referral  [] Other: Professional Referral  Please check if needed. [] Dietitian Consult   [] Wt.  Management Referral  [] Other: Professional Referral  Please check if needed. [] Dietitian Consult   [] Wt. Management Referral  [] Other: Professional Referral  Please check if needed. [] Dietitian Consult   [] Wt. Management Referral  [] Other:   *Education* *Education* *Education* *Education* *Education* *Education*   Nutritional Education Recommended    [x] 1:1 Registered Dietitian    Workshops  [x] Label Reading   [x] Menu  [x] Targeting Nutrition Priorities  [x] Fueling a Healthy Body   Nutritional Education Attended/Date  2/8 label reading  Nutritional Education Attended/Date  3/22 1:1 dietician   3/22 [employer]. Nutritional Education Attended/Date Nutritional Education Attended/Date All Sessions Completed?     [] Yes  [] No   Cooking School  Recommended     [x] Adding Flavor  [x] Fast & Healthy     Breakfasts  [x] Salads & Dressings  [x] Savory Soups  [x] Simple Sides & Sauces  [x] Appetizers &     Snacks  [x] Delicious Desserts  [x] Plant-Based Proteins  [x] Fast Evening Meals  [x] Weekend Breakfasts  [x] Efficiency Cooking  [x] One-Pot [employer]  Sessions Completed see list   Cooking School  Sessions Completed see list Cooking School  Sessions Completed     Cooking School  Sessions Completed Cooking School    # of sessions completed:  **   *Goals* *Goals* *Goals* *Goals* *Goals* *Goals*   Freddy's nutritional goals are as follows:  Complete and return diet survey Freddy's nutritional goals are as follows:  [x] Attend Nutrition Workshops  [x] Attend 1:1   [] Attend Cooking Classes  [] ** Freddy's nutritional goals are as follows:  [x] Attend Nutrition Workshops  [] Attend 1:1   [] Attend Cooking Classes  [x] Complete and return diet survey  [] ** Freddy's nutritional goals are as follows:  [] Attend Nutrition Workshops  [] Attend 1:1   [] Attend Cooking Classes  [] ** Freddy's nutritional goals are as follows:  [] Attend Nutrition Workshops  [] Attend 1:1   [] Attend Cooking Classes  [] ** Freddy achieved nutritional goals   [] Yes    [] No  If no, why?  Use knowledge gained to continue Pritikin eating plan at home       Individual Cardiac Treatment Plan - Psychosocial  PSYCHOSOCIAL  ASSESSMENT/PLAN PSYCHOSOCIAL  REASSESSMENT PSYCHOSOCIAL   REASSESSMENT PSYCHOSOCIAL   REASSESSMENT PSYCHOSOCIAL  DISCHARGE/FOLLOW-UP PSYCHOSOCIAL  DISCHARGE/FOLLOW-UP   Stages of Change Stages of Change Stages of Change Stages of Change Stages of Change Stages of Change   [] Pre Contemplation  [x] Contemplation  [] Preparation  [] Action  [] Maintenance  [] Relapse [] Pre Contemplation  [x] Contemplation  [] Preparation  [] Action  [] Maintenance  [] Relapse [] Pre Contemplation  [] Contemplation  [x] Preparation  [] Action  [] Maintenance  [] Relapse [] Pre Contemplation  [] Contemplation  [] Preparation  [] Action  [] Maintenance  [] Relapse [] Pre Contemplation  [] Contemplation  [] Preparation  [] Action  [] Maintenance  [] Relapse [] Pre Contemplation  [] Contemplation  [] Preparation  [] Action  [] Maintenance  [] Relapse   PSYCHOSOCIAL ASSESSMENT PSYCHOSOCIAL ASSESSMENT PSYCHOSOCIAL ASSESSMENT PSYCHOSOCIAL ASSESSMENT PSYCHOSOCIAL ASSESSMENT PSYCHOSOCIAL ASSESSMENT   Behavioral Outcomes Behavioral Outcomes Behavioral Outcomes Behavioral Outcomes Behavioral Outcomes Behavioral Outcomes   Tool Used:  Ferrans & Franco, Quality of Life Index, Cardiac Version IV  *Given to patient to complete.  Tool Used:    Cynthia & Franco, Quality of Life Index, Cardiac Version IV     QOL Index Score: 27.03  HF:26.13  S&E:26.67  P&S: 27.14  Family: 30   Tool Used:     Cynthia & Franco, Quality of Life Index, Cardiac Version IV    QOL Index Score: **  HF:**  S&E:**  P&S: **  Family: ** Tool Used:     Cynthia & Franco, Quality of Life Index, Cardiac Version IV    QOL Index Score: **  HF:**  S&E:**  P&S: **  Family: **   PHQ-9 score 2  Depression Severity  [x]Minimal  []Mild   []Moderate  []Moderately Severe  []Severe    PHQ-9 score **  Depression Severity  []Minimal  []Mild []Moderate  []Moderately Severe []Severe PHQ-9 score **  Depression Severity  []Minimal  []Mild   []Moderate  []Moderately Severe []Severe   Does patient have Family Support? [x] Yes      [] No  No signs of marital/family distress        Within the Past Month:  *Have you wished you were dead or wished you could go to sleep and not wake up? [] Yes      [x] No  *Have you had any thoughts of killing yourself? [] Yes      [x] No          Using a scale of 0-10, 0=none, 10=very:   Rate your depression: 0  Rate your anxiety:  2  Using a scale of 0-10, 0=none, 10=very:   Rate your depression: 0  Rate your anxiety:  2 Using a scale of 0-10, 0=none, 10=very:   Rate your depression: 0  Rate your anxiety:  2 Using a scale of 0-10, 0=none, 10=very:   Rate your depression: **  Rate your anxiety:  ** Using a scale of 0-10, 0=none, 10=very:   Rate your depression: **  Rate your anxiety:  ** Using a scale of 0-10, 0=none, 10=very:   Rate your depression: **  Rate your anxiety:  **   Signs and Symptoms of Depression Present? [] Yes      [x] No   Signs and Symptoms of Depression Present? [] Yes      [x] No  If yes, please explain:  ** Signs and Symptoms of Depression Present? [] Yes      [x] No  If yes, please explain:   Signs and Symptoms of Depression Present? [] Yes      [] No  If yes, please explain:  ** Signs and Symptoms of Depression Present? [] Yes      [] No  If yes, please explain:  ** Signs and Symptoms of Depression Present? [] Yes      [] No  If yes, please explain:  **   Signs and Symptoms of Anxiety Present? [] Yes      [x] No   Signs and Symptoms of Anxiety Present? [] Yes      [x] No  If yes, please explain:  ** Signs and Symptoms of Anxiety Present? [] Yes      [x] No  If yes, please explain:   Signs and Symptoms of Anxiety Present? [] Yes      [] No  If yes, please explain:  ** Signs and Symptoms of Anxiety Present?     [] Yes      [] No  If yes, please explain:  ** Signs and Symptoms of Anxiety Present? [] Yes      [] No  If yes, please explain:  **   [] Patient has poor eye contact   [] Flat affect present. [] Signs of anxiety, anger or hostility    [] Signs social isolation present. []  Signs of alcohol or substance abuse        PSYCHOSOCIAL PLAN PSYCHOSOCIAL PLAN PSYCHOSOCIAL PLAN PSYCHOSOCIAL PLAN PSYCHOSOCIAL PLAN PSYCHOSOCIAL PLAN   *Interventions* *Interventions* *Interventions* *Interventions* *Interventions* *Interventions*   *Please check if needed  [] Psych Consult  [] Physician Referral  [x] Stress Management Skills *Please check if needed  [] Psych Consult  [] Physician Referral  [x] Stress Management Skills *Please check if needed  [] Psych Consult  [] Physician Referral  [x] Stress Management Skills *Please check if needed  [] Psych Consult  [] Physician Referral  [] Stress Management Skills *Please check if needed  [] Psych Consult  [] Physician Referral  [] Stress Management Skills *Please check if needed  [] Psych Consult  [] Physician Referral  [] Stress Management Skills   Is patient currently taking anti-depressant or anti-anxiety medications? [] Yes      [x] No   Change in anti-depressant or anti-anxiety medications? [] Yes      [x] No  If yes, please list medications:   Change in anti-depressant or anti-anxiety medications? [] Yes      [x] No  If yes, please list medications:  ** Change in anti-depressant or anti-anxiety medications? [] Yes      [] No  If yes, please list medications:  ** Change in anti-depressant or anti-anxiety medications? [] Yes      [] No  If yes, please list medications:  ** Change in anti-depressant or anti-anxiety medications?   [] Yes      [] No  If yes, please list medications:  **   *Education* *Education* *Education* *Education* *Education* *Education*   Healthy Mind-Set Workshops Recommended  [x] Stress & Health  [x] Taking Charge of Stress  [x] New Thoughts, New Behaviors  [x] Managing Moods & Relationships Healthy Mind-Set Contemplation  [] Contemplation  [] Preparation  [] Action  [] Maintenance  [] Relapse [] Pre Contemplation  [] Contemplation  [] Preparation  [] Action  [] Maintenance  [] Relapse   RISK FACTOR/EDUCATION ASSESSMENT RISK FACTOR/EDUCATION ASSESSMENT RISK FACTOR/EDUCATION ASSESSMENT RISK FACTOR/EDUCATION ASSESSMENT RISK FACTOR /EDUCATION ASSESSMENT RISK FACTOR /EDUCATION ASSESSMENT   Hypertension  [x] Yes      [] No    Resting BP: 114/68  Peak Ex BP:142/78  Medication: losartan, metoprolol   Hypertension  Resting BP: 114/62  Peak Ex BP:154/62  Medication Changes:  [] Yes      [x] No Hypertension  Resting BP: 108/72  Peak Ex BP:156/62  Medication Changes:  [] Yes      [x] No Hypertension  Resting BP: **  Peak Ex BP:**  Medication Changes:  [] Yes      [] No Hypertension  Resting BP: **  Peak Ex BP:**  Medication Changes:  [] Yes      [] No Hypertension  Resting BP: **  Peak Ex BP:**  Medication Changes:  [] Yes      [] No   Lipids  HLD/DLD  [x] Yes      [] No  TOTAL CHOL: 141  HDL:  48  LDL:  81  TRI  Medication: atorvastatin Lipids  Medication Changes:  [] Yes      [x] No     Lipids  Medication Changes:  [] Yes      [x] No     Lipids  Medication Changes:  [] Yes      [] No     Lipids    TOTAL CHOL: **  HDL:  **  LDL:  **  TRIG:  **  Medication Changes:  [] Yes      [] No Lipids    TOTAL CHOL: **  HDL:  **  LDL:  **  TRIG:  **  Medication Changes:  [] Yes      [] No   Diabetes  [] Yes      [x] No  FBS: 104            Diabetes  na   Diabetes  na     Diabetes  Most Recent BS:  BS have been in range  [] Yes      [] No  Medication Changes  [] Yes      [] No     Diabetes  Most Recent BS:  BS have been in range  [] Yes      [] No  Medication Changes  [] Yes      [] No Diabetes  Most Recent BS:  BS have been in range  [] Yes      [] No  Medication Changes  [] Yes      [] No       Tobacco Use  [] Current  [x] Former  [] Never    Years smoked: 48:    Date Quit:     # cigarettes smoked/day: 1-2.5 pks/day    Smokeless Tobacco use:   [x] Yes      [] No  Quit 2018 Tobacco Use  Change in smoking status   [] Yes      [x] No     Tobacco Use  Change in smoking status   [] Yes      [x] No       Tobacco Use  Change in smoking status   [] Yes      [] No    Quit date: ** Tobacco Use  Change in smoking status   [] Yes      [] No    Quit date: ** Tobacco Use  Change in smoking status   [] Yes      [] No    Quit date: **             Learning Barriers  Please select one:  [] Speech  [] Literacy  [x] Hearing  [] Cognitive  [] Vision  [x] Ready to Learn Learning Barriers Addressed:   [x] Yes      [] No   Learning Barriers Addressed:   [x] Yes      [] No   Learning Barriers Addressed:  [] Yes      [] No Learning Barriers Addressed:  [] Yes      [] No Learning Barriers Addressed:  [] Yes      [] No     RISK FACTOR/EDUCATION PLAN RISK FACTOR/EDUCATION PLAN RISK FACTOR/EDUCATION PLAN RISK FACTOR/EDUCATION PLAN RISK FACTOR/EDUCATION PLAN RISK FACTOR/EDUCATION PLAN   *Interventions* *Interventions* *Interventions* *Interventions* *Interventions* *Interventions*   Recommended Educational Videos    [x] Overview of The Pritikin Eating Plan  [x] Heart Disease Risk Reduction  [x] Move It!   [x] Calorie Density  [x] Healthy Minds, Bodies, Hearts  [x] Label Reading  [x] Metabolic Syndrome & Belly   Or  [] How Our Thoughts Can Heal our Heart  [x] Dining Out-Part 1  [x] Biomechanical Limitations  [x] Facts on Fat  [x] Hypertension & Heart Disease  [x] Diseases of Our Times-Focusing on Diabetes  [x] Body Composition  [x] Nurtition Action Plan  [x] Exercise Action Plan   Completed Videos/Date      2/4/21  Overview of The Pritikin Eating Plan  2/24 Sleep   2/22 Exercise plan  2/12 Vit and Minerals   2/10 Biomechanical limits Completed Videos/Date  3/3 menu  3/5become pritikin   3/10 calorie density  3/19disease of time 1  3/24 heart disease risk   Completed Videos/Date Completed Videos/Date Recommended Educational Videos Completed    [] Yes      [] No    **If not completed, Why? **           Smoking Cessation/Relaspe Prevention Intervention needed? [] Yes      [x] No   Smoking Cessation/Relapse Prevention Scheduled? [] Yes      [x] No  Date:  ** Smoking Cessation/Relapse Prevention completed? [] Yes      [x] No  Date:  Smoking Cessation/Relapse Prevention completed? [] Yes      [] No  Date: ** Smoking Cessation/Relapse Prevention completed? [] Yes      [] No  Date: ** Smoking Cessation Counseling attended  [] Yes      [] No  **If not completed, Why? **   Professional Referrals:  *Please check if needed  [] Diabetes Clinic  [] Lipid Clinic   [] Other:      Professional Referrals:  *Please check if needed  [] Diabetes Clinic  [] Lipid Clinic   [] Other:   Preventative Medication Preventative Medication Preventative Medication Preventative Medication Preventative Medication Preventative Medication   Aspirin  [x] Yes    [] No  Blood Thinner: Clopidogrel/Effient/Brillinta  [x] Yes    [] No  Beta Blocker  [x] Yes    [] No  Ace Inhibitor  [] Yes    [x] No  Statin/Lipid Lowering  [x] Yes    [] No Medication Changes? [] Yes    [x] No Medication Changes? [] Yes    [x] No Medication Changes? [] Yes    [] No Medication Changes? [] Yes    [] No Medication Changes? [] Yes    [] No   *Education* *Education* *Education* *Education* *Education* *Education*   Does [place] Mcdonough require any additional education? [] Yes    [x] No   Does Tamiko Mcdonough require any additional education? [] Yes    [x] No Does Tamiko Mcdonough require any additional education? [] Yes    [x] No Does [place] Mcdonough require any additional education? [] Yes    [] No Does Tamiko Mcdonough require any additional education? [] Yes    [] No Does Tamiko Mcdonough require any additional education?   [] Yes    [] No   Additional Educational Videos    Exercise  [] Improve Performance    Medical  [] Aging Enhancing Your QoL  [] Biology of Weight Control  [] Decoding Lab Results  [] Diseases of Our Time - Overview  [] Sleep Disorders    Nutrition  [] Dining Out - Part 2  [] Fueling a Healthy Body  [] Menu Workshop  [] Planning Your Eating Strategy  [] Targeting Your Nutrition Priorities  [] Vitamins & Minerals    Healthy Mind-Set  [] Smoking Cessation    Culinary  []Becoming a Pritikin    [] Cooking - Breakfast & Snacks  [] Cooking -Healhty Salads & Dressing  [] Cooking -Dinner & Sides  [] Cooking -Soups & Desserts    Overview  [] The Pritikin Solution Additional Educational Videos Completed Additional Educational Videos Completed Additional Educational Videos Completed Additional Educational Videos Completed Additional Educational Videos Completed    [] Yes    [] No   *Goals* *Goals* *Goals* *Goals* *Goals* *Goals*   Freddy's risk factor/education goals are as follows:    [x] Optimal BP <140/90  [] Blood Sugar <120  [x] Attend recommended video education sessions  [x] Takes medications as prescribed 100% of the time   [] ** Freddy's risk factor/education goals are as follows:    [x] Optimal BP <140/90  [] Blood Sugar <120  [x] Attend recommended video education sessions  [x] Takes medications as prescribed 100% of the time   [] ** Freddy's risk factor/education goals are as follows:    [x] Optimal BP <140/90  [] Blood Sugar <120  [x] Attend recommended video education sessions  [x] Takes medications as prescribed 100% of the time   [] ** Freddy's risk factor/education goals are as follows:    [x] Optimal BP <140/90  [] Blood Sugar <120  [x] Attend recommended video education sessions  [x] Takes medications as prescribed 100% of the time   [] ** Freddy's risk factor/education goals are as follows:    [x] Optimal BP <140/90  [] Blood Sugar <120  [x] Attend recommended video education sessions  [x] Takes medications as prescribed 100% of the time   [] Leti Corona achieved risk factor goals?   [] Yes    [] No  If no, why?  **     Monitored telemetry has revealed NSR   Monitored telemetry has revealed NSR heart block OCC PVC/PAC wore heart monitor  2 week Monitored telemetry has revealed: NSR PAC/PVC bigemeny PACs   Loop monitor inserted a few weeks ago.   Monitored telemetry has revealed **  [] documented arrhythmia at increasing workloads  [] associated symptoms ** Monitored telemetry has revealed **  [] documented arrhythmia at increasing workloads  [] associated symptoms ** Monitored telemetry has revealed **  [] documented arrhythmia at increasing workloads  [] associated symptoms **   Physician Response    [x] Cardiac rehab is reasonably and medically necessary for continuous cardiac monitoring surveillance  of patient's cardiac activity  [x] Initiate continuous telemetry monitoring and notify me with any concerns  [] Other   Physician Response    [x] Cardiac rehab is reasonably and medically necessary for continuous cardiac monitoring surveillance  of patient's cardiac activity  [x] Continue continuous telemetry monitoring and notify me with any concerns  [] Other     Physician Response    [x] Cardiac rehab is reasonably and medically necessary for continuous cardiac monitoring surveillance  of patient's cardiac activity  [x] Continue continuous telemetry monitoring and notify me with any concerns   [] Other     Physician Response    [x] Cardiac rehab is reasonably and medically necessary for continuous cardiac monitoring surveillance  of patient's cardiac activity  [x] Continue continuous telemetry monitoring and notify me with any concerns   [] Other     Physician Response    [x] Cardiac rehab is reasonably and medically necessary for continuous cardiac monitoring surveillance  of patient's cardiac activity  [x] Continue continuous telemetry monitoring and notify me with any concerns   [] Other

## 2021-04-01 NOTE — PLAN OF CARE
Hospital Facility-Based Program  Phase 2 Cardiac Rehab Weekly Progress Report      Patient prescribed exercise:  2:15 class. 3 times per week in rehab, 1-4 times per week at home for the amount of sessions/weeks specified by insurance. Current Levels: Treadmill: 2. 8mph/3% for 15 minutes, Schwinn Airdyne: Level 1.7 for 15 minutes, UBE: Level 0.8 for 5 minutes. Progression Discussion: Maintain Aerobic exercise 15 minutes to work on endurance. Attempt to increase intensity by 5-20% for each modality this week. Try to increase intensities until Fidel Cohen rates the exercises a 13-17 on Nacho RPE.

## 2021-04-02 ENCOUNTER — HOSPITAL ENCOUNTER (OUTPATIENT)
Dept: CARDIAC REHAB | Age: 66
Setting detail: THERAPIES SERIES
Discharge: HOME | End: 2021-04-02
Payer: COMMERCIAL

## 2021-04-02 ENCOUNTER — APPOINTMENT (OUTPATIENT)
Dept: CARDIAC REHAB | Age: 66
End: 2021-04-02
Payer: COMMERCIAL

## 2021-04-05 ENCOUNTER — HOSPITAL ENCOUNTER (OUTPATIENT)
Dept: CARDIAC REHAB | Age: 66
Setting detail: THERAPIES SERIES
Discharge: HOME | End: 2021-04-05
Payer: COMMERCIAL

## 2021-04-05 PROCEDURE — G0422 INTENS CARDIAC REHAB W/EXERC: HCPCS

## 2021-04-05 PROCEDURE — G0423 INTENS CARDIAC REHAB NO EXER: HCPCS

## 2021-04-05 NOTE — PROGRESS NOTES
Video Education Report - ICR/CR  Name:  Karen Herrera     Date:  4/5/2021  MRN: [id number]     Session #:  23  Session Length: 40 min    Core Videos        []Heart Disease Risk Reduction       []Overview of Pritikin Eating Plan      []Move it          []Calorie Density         []Healthy Minds, Bodies, Hearts        []Label Reading - Part 1       []Metabolic Syndrome and Belly Fat        []How Our Thoughts Can Heal Our Hearts   []Dining Out - Part 1      []Biomechanical Limitations  []Facts on Fat        [x]Hypertension & Heart Disease    []Diseases of Our Time - Focusing on Diabetes       Exercise      Healthy Mind-Set  []Improving Performance    []Smoking Cessation    []Introduction to [address]  []Aging-Enhancing the Quality of Your Life  []Becoming a Pritikin   []Biology of Weight Control    []Cooking Breakfasts   []Decoding Lab Results    and Snacks  []Diseases of Our Time - Overview   []Cooking Dinner and   []Sleep Disorders     Sides  []Targeting Your Nutrition Priorities   []Healthy Salads &   Dressings  Nutrition      []Cooking Soups and   []Dining Out - Part 2     Desserts  []Fueling a Healthy Body   []Menu Workshop     Overview  []Planning Your Eating Strategy   []The Pritikin Solution  []Vitamins and Minerals    Comments:  Video completed, group discussion

## 2021-04-07 ENCOUNTER — HOSPITAL ENCOUNTER (OUTPATIENT)
Dept: CARDIAC REHAB | Age: 66
Setting detail: THERAPIES SERIES
Discharge: HOME | End: 2021-04-07
Payer: COMMERCIAL

## 2021-04-07 PROCEDURE — G0423 INTENS CARDIAC REHAB NO EXER: HCPCS

## 2021-04-07 PROCEDURE — G0422 INTENS CARDIAC REHAB W/EXERC: HCPCS

## 2021-04-07 NOTE — PROGRESS NOTES
Video Education Report - ICR/CR  Name:  Sandra Duran     Date:  4/7/2021  MRN: [id number]     Session #:  21  Session Length: 40 min    Core Videos        []Heart Disease Risk Reduction       []Overview of [address] [place] [employer]      [x]Move it          []Calorie Density         []Healthy Minds, Bodies, Hearts        []Label Reading - Part 1       []Metabolic Syndrome and Belly Fat        []How Our Thoughts Can Heal Our Hearts   []Dining Out - Part 1          Exercise      Healthy Mind-Set  []Improving Performance    []Smoking Cessation    []Introduction to [address]  []Aging-Enhancing the Quality of Your Life  []Becoming a Pritikin   []Biology of Weight Control    []Cooking Breakfasts   []Decoding Lab Results    and Snacks  []Diseases of Our Time - Overview   []Cooking Dinner and   []Sleep Disorders     Sides  []Targeting Your Nutrition Priorities   []Healthy Salads &   Dressings  Nutrition      []Cooking Soups and   []Dining Out - Part 2     Desserts  []Fueling a Healthy Body   []Menu Workshop     Overview  []Planning Your Eating Strategy   []The Pritikin Solution  []Vitamins and Minerals    Comments:  Video completed, group discussion

## 2021-04-09 ENCOUNTER — HOSPITAL ENCOUNTER (OUTPATIENT)
Dept: CARDIAC REHAB | Age: 66
Setting detail: THERAPIES SERIES
Discharge: HOME | End: 2021-04-09
Payer: COMMERCIAL

## 2021-04-09 ENCOUNTER — APPOINTMENT (OUTPATIENT)
Dept: CARDIAC REHAB | Age: 66
End: 2021-04-09
Payer: COMMERCIAL

## 2021-04-12 ENCOUNTER — HOSPITAL ENCOUNTER (OUTPATIENT)
Dept: CARDIAC REHAB | Age: 66
Setting detail: THERAPIES SERIES
Discharge: HOME | End: 2021-04-12
Payer: COMMERCIAL

## 2021-04-12 PROCEDURE — G0423 INTENS CARDIAC REHAB NO EXER: HCPCS

## 2021-04-12 PROCEDURE — G0422 INTENS CARDIAC REHAB W/EXERC: HCPCS

## 2021-04-12 NOTE — PROGRESS NOTES
Ysabel Bergeron YOB: 1955    Acct Number: [de-identified]   MRN:  [id number]                             [facility] NUTRITION WORKSHOP             Date: 2021        Session # _______    [place] class covered:    ()  Label Reading  ()  Menus  ()  Targeting Nutrition Priorities  (X)  Fueling a Healthy Body    Readiness to change:    ( ) Pre-contemplative   ( ) Contemplative - ambivalent about change    ( ) Action - ready to set action plan and implement   (X ) Maintenance - has made change and is trying, and or practicing different alternative behaviors       Fidel Cohen was in the Workshop with the Dietitian for 45 minutes. The content was presented via Powerpoint, lecture, and patient participation based format. Motivational interviewing was utilized when needed, to promote change. Patient voiced understanding.     Electronically signed by Mason Frank RD LD [address]  on 2021 at 12:10 PM

## 2021-04-14 ENCOUNTER — HOSPITAL ENCOUNTER (OUTPATIENT)
Dept: CARDIAC REHAB | Age: 66
Setting detail: THERAPIES SERIES
Discharge: HOME | End: 2021-04-14
Payer: COMMERCIAL

## 2021-04-14 PROCEDURE — G0422 INTENS CARDIAC REHAB W/EXERC: HCPCS

## 2021-04-14 PROCEDURE — G0423 INTENS CARDIAC REHAB NO EXER: HCPCS

## 2021-04-14 NOTE — PROGRESS NOTES
Video Education Report - ICR/CR  Name:  Terry García     Date:  4/14/2021  MRN: [id number]     Session #:  25  Session Length: 40 min    Core Videos        []Heart Disease Risk Reduction       []Overview of Pritikin Eating Plan      []Move it          []Calorie Density         []Healthy Minds, Bodies, Hearts        []Label Reading - Part 1       []Metabolic Syndrome and Belly Fat        []How Our Thoughts Can Heal Our Hearts   []Dining Out - Part 1          Exercise      Healthy Mind-Set  []Improving Performance    []Smoking Cessation    []Introduction to [address]  []Aging-Enhancing the Quality of Your Life  []Becoming a Pritikin   [x]Biology of Weight Control    []Cooking Breakfasts   []Decoding Lab Results    and Snacks  []Diseases of Our Time - Overview   []Cooking Dinner and   []Sleep Disorders     Sides  []Targeting Your Nutrition Priorities   []Healthy Salads &   Dressings  Nutrition      []Cooking Soups and   []Dining Out - Part 2     Desserts  []Fueling a Healthy Body   []Menu Workshop     Overview  []Planning Your Eating Strategy   []The Pritikin Solution  []Vitamins and Minerals    Comments:  Video completed, group discussion

## 2021-04-16 ENCOUNTER — APPOINTMENT (OUTPATIENT)
Dept: CARDIAC REHAB | Age: 66
End: 2021-04-16
Payer: COMMERCIAL

## 2021-04-16 ENCOUNTER — HOSPITAL ENCOUNTER (OUTPATIENT)
Dept: CARDIAC REHAB | Age: 66
Setting detail: THERAPIES SERIES
Discharge: HOME | End: 2021-04-16
Payer: COMMERCIAL

## 2021-04-16 NOTE — PLAN OF CARE
Hospital Facility-Based Program  Phase 2 Cardiac Rehab Weekly Progress Report      Patient prescribed exercise:  8:00 class. 3 times per week in rehab, 1-4 times per week at home for the amount of sessions/weeks specified by insurance. Current Levels: Treadmill: 2. 9mph/3% for 15 minutes, Schwinn Airdyne: Level 1.9 for 15 minutes,  UBE: Level 1.0 for 5 minutes. Progression Discussion: Maintain Aerobic exercise 15 minutes to work on endurance. Attempt to increase intensity by 5-20% for each modality this week. Try to increase intensities until Bonnielee Pickup rates the exercises a 13-17 on Nacho RPE.

## 2021-04-19 ENCOUNTER — HOSPITAL ENCOUNTER (OUTPATIENT)
Dept: CARDIAC REHAB | Age: 66
Setting detail: THERAPIES SERIES
Discharge: HOME | End: 2021-04-19
Payer: COMMERCIAL

## 2021-04-19 PROCEDURE — G0422 INTENS CARDIAC REHAB W/EXERC: HCPCS

## 2021-04-19 PROCEDURE — G0423 INTENS CARDIAC REHAB NO EXER: HCPCS

## 2021-04-19 NOTE — PROGRESS NOTES
Video Education Report - ICR/CR  Name:  Zachariah Alarcon     Date:  4/19/2021  MRN: [id number]     Session #:  21  Session Length: 40 min    Core Videos        []Heart Disease Risk Reduction       []Overview of Pritikin Eating Plan      []Move it          []Calorie Density         []Healthy Minds, Bodies, Hearts        []Label Reading - Part 1       []Metabolic Syndrome and Belly Fat        []How Our Thoughts Can Heal Our Hearts   []Dining Out - Part 1      []Biomechanical Limitations      Exercise      Healthy Mind-Set  []Improving Performance    []Smoking Cessation    []Introduction to [address]  []Aging-Enhancing the Quality of Your Life  []Becoming a Pritikin   []Biology of Weight Control    []Cooking Breakfasts   []Decoding Lab Results    and Snacks  []Diseases of Our Time - Overview   []Cooking Dinner and   []Sleep Disorders     Sides  []Targeting Your Nutrition Priorities   []Healthy Salads &   Dressings  Nutrition      []Cooking Soups and   [x]Dining Out - Part 2     Desserts  []Fueling a Healthy Body   []Menu Workshop     Overview  []Planning Your Eating Strategy   []The Pritikin Solution  []Vitamins and Minerals    Comments:  Video completed, group discussion

## 2021-04-20 ENCOUNTER — PROCEDURE VISIT (OUTPATIENT)
Dept: CARDIOLOGY CLINIC | Age: 66
End: 2021-04-20

## 2021-04-20 DIAGNOSIS — R00.2 PALPITATION: ICD-10-CM

## 2021-04-20 NOTE — PROGRESS NOTES
[employer] linq     Battery ok   Hx leanna   1 previously mentioned at fib, no new   Edwardo  False pauses

## 2021-04-21 ENCOUNTER — HOSPITAL ENCOUNTER (OUTPATIENT)
Dept: CARDIAC REHAB | Age: 66
Setting detail: THERAPIES SERIES
Discharge: HOME | End: 2021-04-21
Payer: COMMERCIAL

## 2021-04-21 PROCEDURE — G0423 INTENS CARDIAC REHAB NO EXER: HCPCS

## 2021-04-21 PROCEDURE — G0422 INTENS CARDIAC REHAB W/EXERC: HCPCS

## 2021-04-21 NOTE — PROGRESS NOTES
Rayshawn MORROW.:      MRN:  [id number]    Date: 2021      Session Length:  31 min   Session # _______    EXERCISE WORKSHOP:  Resistance Training & Core Strength                        Todays class addressed ways to improve overall muscular fitness and core strength. Reviewed a variety of resistance training, and core strengthening exercises that are designed to improve muscular endurance, strength, stability, and balance. Encouraged participants to incorporate resistance training and core strength exercises into their current home exercise program.      Readiness to change:    ( ) Pre-contemplative   ( ) Contemplative - ambivalent about change    ( ) Action - ready to set action plan and implement   ( ) Maintenance - has made change and is trying, and or practicing different alternative behaviors     Additional Notes:      Radha Rubalcava was in the Workshop with the Exercise Physiologist for 31 minutes. The content was presented via Powerpoint, lecture, and patient participation based format. Motivational interviewing was utilized when needed, to promote change. Patient voiced understanding.     Electronically signed by Kenneth Guadarrama on 2021 at 8:29 AM

## 2021-04-23 ENCOUNTER — HOSPITAL ENCOUNTER (OUTPATIENT)
Dept: CARDIAC REHAB | Age: 66
Setting detail: THERAPIES SERIES
Discharge: HOME | End: 2021-04-23
Payer: COMMERCIAL

## 2021-04-23 PROCEDURE — G0422 INTENS CARDIAC REHAB W/EXERC: HCPCS

## 2021-04-23 PROCEDURE — G0423 INTENS CARDIAC REHAB NO EXER: HCPCS

## 2021-04-23 NOTE — PROGRESS NOTES
Alysa MORROW.:  1955    Acct Number: [de-identified]   MRN:  [id number]                             [facility] HEALTHY MIND-SET WORKSHOP             Date: 2021        Session #25    Todays class covered:    ( )  Stress and Health Workshop:  [facility] patient will learn about the body's adaptive response that is triggered by a variety of stressors. Patient will gain insight into the toll that chronic stress takes on their health, both emotionally and physically. ( ) Taking Charge of Stress Workshop:  [facility] patient will learn and practice a variety of stress management techniques. Patient will be able to effectively apply coping mechanisms in perceived stressful situations. ( ) New Thoughts New Behaviors Workshop: [facility] patient will learn and practice techniques for developing effective health and lifestyle goals. Patient will be able to effectively apply the goal setting process learned to develop at least one new personal goal.     (x ) Managing Moods & Relationships Workshop:  [facility] patient will learn how emotional and chronic stress factors can impact their hearts. They will learn healthy ways to handle stress and utilize positive coping mechanisms. In addition, [facility] patient will learn ways to improve communication skills. Marty Ganser actively participated and verbalized understanding. Total time in the Healthy Mind-Set class was 40 minutes.     Electronically signed by Flor Nava RN on 2021 at 8:39 AM

## 2021-04-26 ENCOUNTER — HOSPITAL ENCOUNTER (OUTPATIENT)
Dept: CARDIAC REHAB | Age: 66
Setting detail: THERAPIES SERIES
Discharge: HOME | End: 2021-04-26
Payer: COMMERCIAL

## 2021-04-26 PROCEDURE — G0422 INTENS CARDIAC REHAB W/EXERC: HCPCS

## 2021-04-26 PROCEDURE — G0423 INTENS CARDIAC REHAB NO EXER: HCPCS

## 2021-04-26 NOTE — PLAN OF CARE
[address] [place] [address] Facility-Based Program  Individualized Cardiac Treatment Plan    Patient Name:  Penny Simental  :  1955  Age:  60 y.o. MRN:  [id number]  Diagnosis: PCI  Date of Event: 21   Physician:  José Nielsen Office Visit:    Date Entered Program: 21  Risk Stratifications: [x] Low [] Intermediate [] High  Allergies: No Known Allergies    COVID -19 Screen  Do you have any of the following symptoms:  [] Fever [] Cough [] SOB [] Muscle/Body Ache [] Loss of taste/smell [x] None    Have you traveled outside of the [place]? [] Yes      [x] No    Have you been around anyone who has tested Positive for COVID 19?  [] Yes      [x] No    The following Education has been completed with patient. [x] Wait in the lobby until we call you back to rehab. [x] You must wear a mask while in the medical center, and in cardiac rehab. Please bring your own. [x] Bring your own bottle of water with you. [x] You can bring a visitor with you, however, they will need to sit in the waiting room while you are in cardiac rehab.     Individual Cardiac Treatment Plan -EXERCISE  INITIAL 30 DAY 60 DAY 90  DAY FINAL DAY   EXERCISE  ASSESSMENT/PLAN EXERCISE  REASSESSMENT EXERCISE   REASSESSMENT EXERCISE   REASSESSMENT EXERCISE   REASSESSMENT EXERCISE  DISCHARGE/FOLLOW-UP   Date: 21 Date:3/1/2021 Date:3/31/2021 Date:2021 Date: Date:   Session #1 Session # 16 Session # 29 Session # 46 Session # ** Session # **  Last session completed on **   Stages of Change Stages of Change Stages of Change Stages of Change Stages of Change Stages of Change   [] Pre Contemplation  [] Contemplation  [] Preparation  [x] Action  [] Maintenance  [] Relapse [] Pre Contemplation  [] Contemplation  [] Preparation  [x] Action  [] Maintenance  [] Relapse [] Pre Contemplation  [] Contemplation  [] Preparation  [x] Action  [] Maintenance  [] Relapse [] Pre Contemplation  [] Contemplation  [] Preparation  [x] Action  [] Maintenance  [] Relapse [] Pre Contemplation  [] Contemplation  [] Preparation  [] Action  [] Maintenance  [] Relapse [] Pre Contemplation  [] Contemplation  [] Preparation  [] Action  [] Maintenance  [] Relapse   EXERCISE ASSESSMENT EXERCISE ASSESSMENT EXERCISE ASSESSMENT EXERCISE ASSESSMENT EXERCISE ASSESSMENT EXERCISE ASSESSMENT   6 Min Walk Test  Distance walked:   0.26 miles  1372 ft.  3 METs  Max HR:88 BPM      RPE:  13    THR:  107-126  Rhythm:  NSR     6 Min Walk Test  Distance walked:   ** miles  ** ft  ** METs  Max HR:** BPM      RPE:  **  %Change ft= **    Rhythm:  **   DASI: 8.9 METs DASI: 8.9 METs DASI:  METs DASI: 9.0 METs DASI: ** METs DASI: ** METs   Return to Work  [employer] on returning to work? [] Yes              [] No   [] Disabled     [x] Retired     Return to work:  Has Radha Pack returned to work? [] Yes    [x] No     Return to work:  Has Radha Pack returned to work? [] Yes    [x] No     Return to work:  Has Radha Pack returned to work? [] Yes    [x] No     Return to work:  Has Radha Pack returned to work? [] Yes    [] No    Return to work date set? [] Yes, **    [] No    Radha Pack is doing ** at work. Return to work:  Has Radha Pack returned to work? [] Yes    [] No    Return to work? [] Yes, **    [] No    *Required MET Level achieved for job duties? [] Yes    [] No   Orthopedic Limitations/  [] Yes    [x] No       Orthopedic Limitations  *If patient has orthopedic issue:   Actions/  accomodations needed to make Radha Pack successful :na Orthopedic Limitations adjust to comfort   Orthopedic Limitations to comfort. Orthopedic Limitations Orthopedic Limitations     Fall Risk  Fall risk assessed? [x] Yes      [] No    Balance Issues?   [] Yes      [x] No     [] Walker [] [employer]    [x] Safety issues reviewed      Fall Risk  *If patient is a fall risk, action needed to accommodate: na  Fall Risk na Fall Risk na Fall Risk Fall Risk   Home Exercise  [x] Yes [] No  Type: bike, TM, elliptical  Frequency: 4 x per week  Duration: 60 min Home Exercise  [x] Yes    [] No  Type: Bike, TM and ellipitcal  Frequency:4x week  Duration: 60 min Home Exercise  [x] Yes    [] No  Type: TM and ellipitcal  Frequency: 4 x week  Duration: 60 Home Exercise  [x] Yes    [] No  Type: TM and elliptical   Frequency:4 x  Duration: 60 Home Exercise  [] Yes    [] No  Type: **  Frequency: **  Duration: ** Home Exercise  [] Yes    [] No  Type: **  Frequency: **  Duration: **   Angina with Activity? [] Yes    [x] No  Angina Management: na Angina with Activity? [x] Yes    [] No  Angina Management: states had only once when had monitor on. Rested Angina with Activity? [x] Yes    [] No  Angina Management: rest Angina with Activity? [x] Yes    [] No  Angina Management: rest Angina with Activity? [] Yes    [] No  Angina Management: ** Angina with Activity?   [] Yes    [] No  Angina Management: **   EXERCISE PLAN EXERCISE PLAN EXERCISE PLAN EXERCISE PLAN EXERCISE PLAN EXERCISE PLAN   *Interventions* *Interventions* *Interventions* *Interventions* *Interventions* *Interventions*   Exercise Prescription  (per physician & CR staff) Exercise Prescription  (per physician & CR staff) Exercise Prescription  (per physician & CR staff) Exercise Prescription  (per physician & CR staff) Exercise Prescription  (per physician & CR staff) Exercise Prescription  (per physician & CR staff)   Cardiovascular Cardiovascular Cardiovascular Cardiovascular Cardiovascular Cardiovascular   Mode:    [x] Treadmill (TM)  [x] Schwinn Airdyne (AD)  [x] Arms Ergometer (AE)  [] NuStep  [] Elliptical (E) MODE:    [x] Treadmill (TM)  [x] Schwinn Airdyne (AD)  [x] Arms Ergometer (AE)  [] NuStep  [] Elliptical (E) MODE:    [x] Treadmill (TM)  [x] Schwinn Airdyne (AD)  [x] Arms Ergometer (AE)  [] NuStep  [] Elliptical (E) MODE:    [x] Treadmill (TM)  [x] Schwinn Airdyne (AD)  [x] Arms Ergometer (AE)  [] NuStep  [] Elliptical (E) MODE: [] Treadmill (TM)  [] Schwinn Airdyne (AD)  [] Arms Ergometer (AE)  [] NuStep  [] Elliptical (E) MODE:    [] Treadmill (TM)  [] Schwinn Airdyne (AD)  [] Arms Ergometer (AE)  [] NuStep  [] Elliptical (E)   Initial Workloads  TM: [email or web address] 3 METs  AD: 1.0 level = 3 METs    AE: 0.5 level = 2 METs Current Workloads  TM: 2.8 @ %=3.2  METs  AD:1.2  level = 2.9 METs  AE:0.6  level = 2 METs Current Workloads  TM: 2.8 @3 %= 4.3 METs  AD: 1.7 level = 3.7 METs  AE:0.8  level = 2.3 METs Current Workloads  TM: 3 @3 %= 4.6 METs  AD: 1.9 level = 4.1 METs  AE: 1.1 level = 2.9 METs Current Workloads  TM:  @ %=  METs  AD:  level =  METs  NS:   Wetzel=  METs  AE:  level =  METs Current Workloads  TM:  @ %=  METs  AD:  level =  METs  NS:   Wetzel=  METs  AE:  level =  METs     Frequency:    ICR: 3x/week  Home: 2-3x/wk Frequency:   ICR: 3x/week  Home: 3x/wk Frequency:  ICR: 3x/week  Home: 3-4x/wk Frequency:  ICR: 3x/week  Home: 3-4x/wk Frequency:  ICR: 3x/week  Home: 3-4x/wk Frequency:  Gisela Acosta will continue exercise at  5-7 days/week   Duration:   Total aerobic exercise = 30-45 min    5-8 min/mode Duration:  Total aerobic exercises = 29 min     12 min/mode Duration:  Total aerobic exercises = 35 min     15 min/mode Duration:  Total aerobic exercises = 35 min     15 min/mode Duration:  Total aerobic exercises = ** min     **min/mode Duration:  Total erobic exercise =  60-90 min   Intensity:   MET Level = 3  RPE = 12-15 Intensity:  Max MET Level = 3.2  RPE = 12-15 Intensity:  Max MET Level = 4.3  RPE = 12-15 Intensity:  Max MET Level =4.6  RPE = 12-15 Intensity:  Max MET Level = **  RPE = 12-15 Intensity:  Max MET Level = ** RPE = 12-15   Progression: increase aerobic activity up to 15 min over next 4 weeks by increasing time 2-3 min/week.  Progression:Increase to 15 min then increase METS 5-10% over next 4 weeks   Progression: Increase METS 5-10 % over next 4 weeks   Progression: increase METS 5-10 % over next 4 weeks Progression: Progression:  Increase time/intensity when RPE <13, and HR is in [facility]   [x] Yes      [] No  Upper and Lower body strength training 2x/wk    Wt: 5#       Reps:  8-15    *Increase wt. after completing 15 reps with an RPE of <12/13. [x] Yes      [] No  Upper and Lower body strength training 2x/wk    Wt: 5#       Reps:  8-15    *Increase wt. after completing 15 reps with an RPE of <12/13. [x] Yes      [] No  Upper and Lower body strength training 2x/wk    Wt: 5#       Reps:  8-15    *Increase wt. after completing 15 reps with an RPE of <12/13. [x] Yes      [] No  Upper and Lower body strength training 2x/wk    Wt: 5#       Reps:  8-15    *Increase wt. after completing 15 reps with an RPE of <12/13. [] Yes      [] No  Upper and Lower body strength training 2x/wk    Wt: **#       Reps:  8-15    *Increase wt. after completing 15 reps with an RPE of <12/13. Continue Strength Training at home   [] Exercise Log & Strength training handout given     Wt: **#       Reps:  8-15    *Increase wt. after completing 15 reps with an RPE of <12/13. Flexibility Flexibility Flexibility Flexibility Flexibility Flexibility   [x] Yes      [] No  25 min session of Core Strength & Flexibility 1x/per week  Attends Core Strength & Flexibility   [x] Yes      [] No Attends Core Strength & Flexibility   [x] Yes      [] No Attends Core Strength & Flexibility   [x] Yes      [] No Attends Core Strength & Flexibility   [] Yes      [] No Continue Core Strength & Flexibility at home   Home Exercise  *Radha Pack verbalizes planning to bike, walk, elliptical 3-4 days/week for 60 min on days not in rehab. Home Exercise  *Freddy verbalizes planning to bike/walk days/week for 3-4 min on days not in rehab. Home Exercise  *Freddy verbalizes planning to walk/ellipitcal days/week for 3-4 45-60 min on days not in rehab.  Home Exercise  *Radha Pack verbalizes planning to walk/ellipitcal days/week for 3-4 min on days not in rehab. Home Exercise  *Freddy verbalizes planning to ** ** days/week for ** min on days not in rehab. Home Exercise  *Ekaterina Audreycalebaldo his/her plan to ** ** days/week for ** min @ **   *Education* *Education* *Education* *Education* *Education* *Education*   RPE Scale  [x] Yes      [] No  Exercise Safety  [x] Yes      [] No  Equipment Orientation  [x] Yes      [] No  S/S to Report  [x] Yes      [] No  Warm Up/Cool Down  [x] Yes      [] No  Home Exercise  [x] Yes      [] No     All Exercise Education Completed  [] Yes      [] No   Exercise Education Recommended    Workshops  [x] Improving Performance  [x] Balance Training & Fall Prevention  [x] Intro to Yoga - Video  [x] Resistance Training & Core Strength Exercise Education Attended/Date  2/15 core strength Exercise Education Attended/Date  3/29 balance Exercise Education Attended/Date  4/21 resistance Exercise Education Attended/Date All Sessions Completed?     [] Yes  [] No   *Goals* *Goals* *Goals* *Goals* *Goals* *Goals*   Initial Exercise Goals Exercise Goals  Exercise Goals   Exercise Goals  Exercise Goals  Exercise Goals   Freddy plans to:  [x] Attend exercise sessions 3x/wk  [x] initiate home exercise 2-3x/wk for 10-20 min  [x] Increase 6 min walk distance by 10%  [x] Attend Exercise workshops [employer] plans to:  [x] Attend exercise sessions 3x/wk  [x] continue home exercise 2-3x/wk for 20-30 min  [x] Attend Exercise workshops Freddy's plans to:  [x] Attend exercise sessions 3x/wk  [x] continue home exercise 3-4x/wk for 30-45 min  [x] Determine plan of exercise following rehab  [x] Attend Exercise workshops Freddy's plans to:  [x] Attend exercise sessions 3x/wk  [x] continue home exercise 3-4x/wk for 30-45 min  [x] Determine plan of exercise following rehab  [x] Attend Exercise workshops Freddy's plans to:  [x] Attend exercise sessions 3x/wk  [x] continue home exercise 3-4x/wk Contemplation  [] Contemplation  [] Preparation  [x] Action  [] Maintenance  [] Relapse [] Pre Contemplation  [] Contemplation  [] Preparation  [] Action  [] Maintenance  [] Relapse [] Pre Contemplation  [] Contemplation  [] Preparation  [] Action  [] Maintenance  [] Relapse   NUTRITION ASSESSMENT NUTRITION ASSESSMENT NUTRITION ASSESSMENT NUTRITION ASSESSMENT NUTRITION ASSESSMENT NUTRITION ASSESSMENT   Weight Management  Weight: 195.8        Height: 66\"   BMI: 31.7  Weight Management  Weight: 195.8                  Weight Management  Weight: 195.8                Weight Management  Weight: 186. 2 Weight Management  Weight: ** Weight Management  Weight: **                    BMI: **   Eating Plan  Current eating habits: nothing in particular, portion control Eating Plan  Changes:less salt portion control Eating Plan  Changes:portion control and less sodium Eating Plan  Changes: portion control and less salt Eating Plan  Changes: Eating Plan Improvements:   Alcohol Use  [] none          [] daily  [x] weekly      [] special   Type: beer, wiskey  Amount: 4+        Diet Assessment Tool:  RATE YOUR PLATE  *Given to patient to complete and return. Diet Assessment Tool:    Score:45 /69        Diet Assessment Tool: RATE YOUR PLATE  Score: **/50   NUTRITION PLAN NUTRITION PLAN NUTRITION PLAN NUTRITION PLAN NUTRITION PLAN NUTRITION PLAN   *Interventions* *Interventions* *Interventions* *Interventions* *Interventions* *Interventions*   Initial Survey given Goal Setting Discussion:   [x] Yes      [] No        Follow Up Survey Reviewed & Goals Updated:     Professional Referral  Please check if needed. [] Dietitian Consult   [] Wt. Management Referral  [] Other:  Professional Referral  Please check if needed. [] Dietitian Consult   [] Wt. Management Referral  [] Other: Professional Referral  Please check if needed. [] Dietitian Consult   [] Wt. Management Referral  [] Other: Professional Referral  Please check if needed.   [] Dietitian Consult   [] Wt. Management Referral  [] Other: Professional Referral  Please check if needed. [] Dietitian Consult   [] Wt. Management Referral  [] Other: Professional Referral  Please check if needed. [] Dietitian Consult   [] Wt. Management Referral  [] Other:   *Education* *Education* *Education* *Education* *Education* *Education*   Nutritional Education Recommended    [x] 1:1 Registered Dietitian    Workshops  [x] Label Reading   [x] Menu  [x] Targeting Nutrition Priorities  [x] Fueling a Healthy Body   Nutritional Education Attended/Date  2/8 label reading  Nutritional Education Attended/Date  3/22 1:1 dietician   3/22 target nut. Nutritional Education Attended/Date  4/12 fueling  Nutritional Education Attended/Date All Sessions Completed?     [] Yes  [] No   Cooking School  Recommended     [x] Adding Flavor  [x] Fast & Healthy     Breakfasts  [x] Salads & Dressings  [x] Savory Soups  [x] Simple Sides & Sauces  [x] Appetizers &     Snacks  [x] Delicious Desserts  [x] Plant-Based Proteins  [x] Fast Evening Meals  [x] Weekend Breakfasts  [x] Efficiency Cooking  [x] One-Pot [employer]  Sessions Completed see list   Cooking School  Sessions Completed see list Cooking School  Sessions Completed see list     Cooking School  Sessions Completed Cooking School    # of sessions completed:  **   *Goals* *Goals* *Goals* *Goals* *Goals* *Goals*   Freddy's nutritional goals are as follows:  Complete and return diet survey Freddy's nutritional goals are as follows:  [x] Attend Nutrition Workshops  [x] Attend 1:1   [] Attend Cooking Classes  [] ** Freddy's nutritional goals are as follows:  [x] Attend Nutrition Workshops  [] Attend 1:1   [] Attend Cooking Classes  [x] Complete and return diet survey  [] ** Freddy's nutritional goals are as follows:  [x] Attend Nutrition Workshops  [] Attend 1:1   [] Attend Cooking Classes  [] ** Freddy's nutritional goals are as follows:  [] Attend Nutrition Workshops  [] Attend 1:1   [] Attend Cooking Classes  [] ** Freddy achieved nutritional goals   [] Yes    [] No  If no, why? Use knowledge gained to continue Pritikin eating plan at home       Individual Cardiac Treatment Plan - Psychosocial  PSYCHOSOCIAL  ASSESSMENT/PLAN PSYCHOSOCIAL  REASSESSMENT PSYCHOSOCIAL   REASSESSMENT PSYCHOSOCIAL   REASSESSMENT PSYCHOSOCIAL  DISCHARGE/FOLLOW-UP PSYCHOSOCIAL  DISCHARGE/FOLLOW-UP   Stages of Change Stages of Change Stages of Change Stages of Change Stages of Change Stages of Change   [] Pre Contemplation  [x] Contemplation  [] Preparation  [] Action  [] Maintenance  [] Relapse [] Pre Contemplation  [x] Contemplation  [] Preparation  [] Action  [] Maintenance  [] Relapse [] Pre Contemplation  [] Contemplation  [x] Preparation  [] Action  [] Maintenance  [] Relapse [] Pre Contemplation  [] Contemplation  [] Preparation  [x] Action  [] Maintenance  [] Relapse [] Pre Contemplation  [] Contemplation  [] Preparation  [] Action  [] Maintenance  [] Relapse [] Pre Contemplation  [] Contemplation  [] Preparation  [] Action  [] Maintenance  [] Relapse   PSYCHOSOCIAL ASSESSMENT PSYCHOSOCIAL ASSESSMENT PSYCHOSOCIAL ASSESSMENT PSYCHOSOCIAL ASSESSMENT PSYCHOSOCIAL ASSESSMENT PSYCHOSOCIAL ASSESSMENT   Behavioral Outcomes Behavioral Outcomes Behavioral Outcomes Behavioral Outcomes Behavioral Outcomes Behavioral Outcomes   Tool Used:  Ferrans & Franco, Quality of Life Index, Cardiac Version IV  *Given to patient to complete.  Tool Used:    Cynthia & Franco, Quality of Life Index, Cardiac Version IV     QOL Index Score: 27.03  HF:26.13  S&E:26.67  P&S: 27.14  Family: 30   Tool Used:     Cynthia & Franco, Quality of Life Index, Cardiac Version IV    QOL Index Score: **  HF:**  S&E:**  P&S: **  Family: ** Tool Used:     Cynthia & Salvador, Quality of Life Index, Cardiac Version IV    QOL Index Score: **  HF:**  S&E:**  P&S: **  Family: **   PHQ-9 score 2  Depression Severity  [x]Minimal  []Mild []Moderate  []Moderately Severe  []Severe    PHQ-9 score **  Depression Severity  []Minimal  []Mild   []Moderate  []Moderately Severe []Severe PHQ-9 score **  Depression Severity  []Minimal  []Mild   []Moderate  []Moderately Severe []Severe   Does patient have Family Support? [x] Yes      [] No  No signs of marital/family distress        Within the Past Month:  *Have you wished you were dead or wished you could go to sleep and not wake up? [] Yes      [x] No  *Have you had any thoughts of killing yourself? [] Yes      [x] No          Using a scale of 0-10, 0=none, 10=very:   Rate your depression: 0  Rate your anxiety:  2  Using a scale of 0-10, 0=none, 10=very:   Rate your depression: 0  Rate your anxiety:  2 Using a scale of 0-10, 0=none, 10=very:   Rate your depression: 0  Rate your anxiety:  2 Using a scale of 0-10, 0=none, 10=very:   Rate your depression: 2  Rate your anxiety:  3 Using a scale of 0-10, 0=none, 10=very:   Rate your depression: **  Rate your anxiety:  ** Using a scale of 0-10, 0=none, 10=very:   Rate your depression: **  Rate your anxiety:  **   Signs and Symptoms of Depression Present? [] Yes      [x] No   Signs and Symptoms of Depression Present? [] Yes      [x] No  If yes, please explain:  ** Signs and Symptoms of Depression Present? [] Yes      [x] No  If yes, please explain:   Signs and Symptoms of Depression Present? [] Yes      [x] No  If yes, please explain:  ** Signs and Symptoms of Depression Present? [] Yes      [] No  If yes, please explain:  ** Signs and Symptoms of Depression Present? [] Yes      [] No  If yes, please explain:  **   Signs and Symptoms of Anxiety Present? [] Yes      [x] No   Signs and Symptoms of Anxiety Present? [] Yes      [x] No  If yes, please explain:  ** Signs and Symptoms of Anxiety Present? [] Yes      [x] No  If yes, please explain:   Signs and Symptoms of Anxiety Present?     [] Yes      [x] No  If yes, please explain:  ** Signs and Symptoms of Anxiety Present? [] Yes      [] No  If yes, please explain:  ** Signs and Symptoms of Anxiety Present? [] Yes      [] No  If yes, please explain:  **   [] Patient has poor eye contact   [] Flat affect present. [] Signs of anxiety, anger or hostility    [] Signs social isolation present. []  Signs of alcohol or substance abuse        PSYCHOSOCIAL PLAN PSYCHOSOCIAL PLAN PSYCHOSOCIAL PLAN PSYCHOSOCIAL PLAN PSYCHOSOCIAL PLAN PSYCHOSOCIAL PLAN   *Interventions* *Interventions* *Interventions* *Interventions* *Interventions* *Interventions*   *Please check if needed  [] Psych Consult  [] Physician Referral  [x] Stress Management Skills *Please check if needed  [] Psych Consult  [] Physician Referral  [x] Stress Management Skills *Please check if needed  [] Psych Consult  [] Physician Referral  [x] Stress Management Skills *Please check if needed  [] Psych Consult  [] Physician Referral  [x] Stress Management Skills *Please check if needed  [] Psych Consult  [] Physician Referral  [] Stress Management Skills *Please check if needed  [] Psych Consult  [] Physician Referral  [] Stress Management Skills   Is patient currently taking anti-depressant or anti-anxiety medications? [] Yes      [x] No   Change in anti-depressant or anti-anxiety medications? [] Yes      [x] No  If yes, please list medications:   Change in anti-depressant or anti-anxiety medications? [] Yes      [x] No  If yes, please list medications:  ** Change in anti-depressant or anti-anxiety medications? [] Yes      [x] No  If yes, please list medications:  ** Change in anti-depressant or anti-anxiety medications? [] Yes      [] No  If yes, please list medications:  ** Change in anti-depressant or anti-anxiety medications?   [] Yes      [] No  If yes, please list medications:  **   *Education* *Education* *Education* *Education* *Education* *Education*   Healthy Mind-Set Workshops Recommended  [x] Stress & Health  [x] Taking Charge of Stress  [x] New Thoughts, New Behaviors  [x] Managing Moods & Relationships Healthy Mind-Set Workshops Attended/Date  2/17Stress and health Healthy Mind-Set Workshops Attended/Date  3/17 taking charge stress Healthy Mind-Set Workshops Attended/Date   4/23 Manage moods   Healthy Mind-Set Workshops  Completed  [] Yes      [] No Healthy Mind-Set Workshops  Completed  [] Yes      [] No   *Goals* *Goals* *Goals* *Goals* *Goals* *Goals*   Freddy's psychosocial goals are as follows:  Complete HADS & Cynthia Franco Quality of Life Index, Cardiac Version IV Freddy's psychosocial goals are as follows:  [x] Attend Healthy Mind-Set Workshops  [x] Reduce depression symptom severity by 1 level  [] ** Freddy's psychosocial goals are as follows:  [x] Attend Healthy Mind-Set Workshops  [x] Reduce depression symptom severity by 1 level  [] ** Joses psychosocial goals are as follows:  [x] Attend Healthy Mind-Set Workshops  [x] Reduce depression symptom severity by 1 level  [] Tato Ramsay achieved psychosocial goals? [] Yes    [] No  If no, why?  **  [] Use methods learned to continue to reduce depression symptom severity by 1 level  [] Tato Ramsay achieved psychosocial goals?   [] Yes    [] No  If no, why?  **  [] Use methods learned to continue to reduce depression symptom severity by 1 level  [] **     Individual Cardiac Treatment Plan - Other:  Risk Factor/Education  RISK FACTOR  ASSESSMENT/PLAN RISK FACTOR  REASSESSMENT  RISK FACTOR  REASSESSMENT RISK FACTOR  REASSESSMENT RISK FACTOR   DISCHARGE/FOLLOW-UP RISK FACTOR   DISCHARGE/FOLLOW-UP   Stages of Change Stages of Change Stages of Change Stages of Change Stages of Change Stages of Change   [] Pre Contemplation  [] Contemplation  [x] Preparation  [] Action  [] Maintenance  [] Relapse [] Pre Contemplation  [] Contemplation  [x] Preparation  [] Action  [] Maintenance  [] Relapse [] Pre Contemplation  [] Contemplation  [] Preparation  [x] Action  [] cigarettes smoked/day: 1-2.5 pks/day    Smokeless Tobacco use:   [x] Yes      [] No  Quit 2018 Tobacco Use  Change in smoking status   [] Yes      [x] No     Tobacco Use  Change in smoking status   [] Yes      [x] No       Tobacco Use  Change in smoking status   [] Yes      [x] No     Tobacco Use  Change in smoking status   [] Yes      [] No    Quit date: ** Tobacco Use  Change in smoking status   [] Yes      [] No    Quit date: **             Learning Barriers  Please select one:  [] Speech  [] Literacy  [x] Hearing  [] Cognitive  [] Vision  [x] Ready to Learn Learning Barriers Addressed:   [x] Yes      [] No   Learning Barriers Addressed:   [x] Yes      [] No   Learning Barriers Addressed:  [x] Yes      [] No Learning Barriers Addressed:  [] Yes      [] No Learning Barriers Addressed:  [] Yes      [] No     RISK FACTOR/EDUCATION PLAN RISK FACTOR/EDUCATION PLAN RISK FACTOR/EDUCATION PLAN RISK FACTOR/EDUCATION PLAN RISK FACTOR/EDUCATION PLAN RISK FACTOR/EDUCATION PLAN   *Interventions* *Interventions* *Interventions* *Interventions* *Interventions* *Interventions*   Recommended Educational Videos    [x] Overview of The Pritikin Eating Plan  [x] Heart Disease Risk Reduction  [x] Move It!   [x] Calorie Density  [x] Healthy Minds, Bodies, Hearts  [x] Label Reading  [x] Metabolic Syndrome & Belly   Or  [] How Our Thoughts Can Heal our Heart  [x] Dining Out-Part 1  [x] Biomechanical Limitations  [x] Facts on Fat  [x] Hypertension & Heart Disease  [x] Diseases of Our Times-Focusing on Diabetes  [x] Body Composition  [x] Nurtition Action Plan  [x] Exercise Action Plan   Completed Videos/Date      2/4/21  Overview of The Pritikin Eating Plan  2/24 Sleep   2/22 Exercise plan  2/12 Vit and Minerals   2/10 Biomechanical limits Completed Videos/Date  3/3 menu  3/5become pritikin   3/10 calorie density  3/19disease of time 1  3/24 heart disease risk   Completed Videos/Date  3/31 facts on fat  4/5 HTN  4/7 Move it   4/14 Biology Wt  4/19 Dining out 2  7/43 Metabolic syndrome Completed Videos/Date Recommended Educational Videos Completed    [] Yes      [] No    **If not completed, Why? **           Smoking Cessation/Relaspe Prevention Intervention needed? [] Yes      [x] No   Smoking Cessation/Relapse Prevention Scheduled? [] Yes      [x] No  Date:  ** Smoking Cessation/Relapse Prevention completed? [] Yes      [x] No  Date:  Smoking Cessation/Relapse Prevention completed? [] Yes      [x] No  Date: ** Smoking Cessation/Relapse Prevention completed? [] Yes      [] No  Date: ** Smoking Cessation Counseling attended  [] Yes      [] No  **If not completed, Why? **   Professional Referrals:  *Please check if needed  [] Diabetes Clinic  [] Lipid Clinic   [] Other:      Professional Referrals:  *Please check if needed  [] Diabetes Clinic  [] Lipid Clinic   [] Other:   Preventative Medication Preventative Medication Preventative Medication Preventative Medication Preventative Medication Preventative Medication   Aspirin  [x] Yes    [] No  Blood Thinner: Clopidogrel/Effient/Brillinta  [x] Yes    [] No  Beta Blocker  [x] Yes    [] No  Ace Inhibitor  [] Yes    [x] No  Statin/Lipid Lowering  [x] Yes    [] No Medication Changes? [] Yes    [x] No Medication Changes? [] Yes    [x] No Medication Changes? [] Yes    [x] No Medication Changes? [] Yes    [] No Medication Changes? [] Yes    [] No   *Education* *Education* *Education* *Education* *Education* *Education*   Does Lizy Talamantes require any additional education? [] Yes    [x] No   Does Lizy Talamantes require any additional education? [] Yes    [x] No Does Lizy Dickersonry require any additional education? [] Yes    [x] No Does Lizy Dickersonry require any additional education? [] Yes    [x] No Does Lizy Talamantes require any additional education? [] Yes    [] No Does Lizy Dickersonry require any additional education?   [] Yes    [] No   Additional Educational Videos    Exercise  [] Improve Performance    Medical  [] Aging Enhancing Your QoL  [] Biology of Weight Control  [] Decoding Lab Results  [] Diseases of Our Time - Overview  [] Sleep Disorders    Nutrition  [] Dining Out - Part 2  [] Fueling a Healthy Body  [] Menu Workshop  [] Planning Your Eating Strategy  [] Targeting Your Nutrition Priorities  [] Vitamins & Minerals    Healthy Mind-Set  [] Smoking Cessation    Culinary  []Becoming a Pritikin    [] Cooking - Breakfast & Snacks  [] Cooking -Healhty Salads & Dressing  [] Cooking -Dinner & Sides  [] Cooking -Soups & Desserts    Overview  [] The Pritikin Solution Additional Educational Videos Completed Additional Educational Videos Completed Additional Educational Videos Completed Additional Educational Videos Completed Additional Educational Videos Completed    [] Yes    [] No   *Goals* *Goals* *Goals* *Goals* *Goals* *Goals*   Freddy's risk factor/education goals are as follows:    [x] Optimal BP <140/90  [] Blood Sugar <120  [x] Attend recommended video education sessions  [x] Takes medications as prescribed 100% of the time   [] ** Freddy's risk factor/education goals are as follows:    [x] Optimal BP <140/90  [] Blood Sugar <120  [x] Attend recommended video education sessions  [x] Takes medications as prescribed 100% of the time   [] ** Freddy's risk factor/education goals are as follows:    [x] Optimal BP <140/90  [] Blood Sugar <120  [x] Attend recommended video education sessions  [x] Takes medications as prescribed 100% of the time   [] ** Freddy's risk factor/education goals are as follows:    [x] Optimal BP <140/90  [] Blood Sugar <120  [x] Attend recommended video education sessions  [x] Takes medications as prescribed 100% of the time   [] ** Freddy's risk factor/education goals are as follows:    [x] Optimal BP <140/90  [] Blood Sugar <120  [x] Attend recommended video education sessions  [x] Takes medications as prescribed 100% of the time   [] Wolfgang Ch achieved risk factor goals?   [] Yes    [] No  If no, why?  **     Monitored telemetry has revealed NSR   Monitored telemetry has revealed NSR heart block OCC PVC/PAC wore heart monitor  2 week     Monitored telemetry has revealed: NSR PAC/PVC bigemeny PACs   Loop monitor inserted a few weeks ago.   Monitored telemetry has revealed NSR freq PVC and PACs    Monitored telemetry has revealed **  [] documented arrhythmia at increasing workloads  [] associated symptoms ** Monitored telemetry has revealed **  [] documented arrhythmia at increasing workloads  [] associated symptoms **   Physician Response    [x] Cardiac rehab is reasonably and medically necessary for continuous cardiac monitoring surveillance  of patient's cardiac activity  [x] Initiate continuous telemetry monitoring and notify me with any concerns  [] Other   Physician Response    [x] Cardiac rehab is reasonably and medically necessary for continuous cardiac monitoring surveillance  of patient's cardiac activity  [x] Continue continuous telemetry monitoring and notify me with any concerns  [] Other     Physician Response    [x] Cardiac rehab is reasonably and medically necessary for continuous cardiac monitoring surveillance  of patient's cardiac activity  [x] Continue continuous telemetry monitoring and notify me with any concerns   [] Other     Physician Response    [x] Cardiac rehab is reasonably and medically necessary for continuous cardiac monitoring surveillance  of patient's cardiac activity  [x] Continue continuous telemetry monitoring and notify me with any concerns   [] Other     Physician Response    [x] Cardiac rehab is reasonably and medically necessary for continuous cardiac monitoring surveillance  of patient's cardiac activity  [x] Continue continuous telemetry monitoring and notify me with any concerns   [] Other

## 2021-04-27 PROCEDURE — G2066 INTER DEVC REMOTE 30D: HCPCS | Performed by: INTERNAL MEDICINE

## 2021-04-27 PROCEDURE — 93298 REM INTERROG DEV EVAL SCRMS: CPT | Performed by: INTERNAL MEDICINE

## 2021-04-28 ENCOUNTER — HOSPITAL ENCOUNTER (OUTPATIENT)
Dept: CARDIAC REHAB | Age: 66
Setting detail: THERAPIES SERIES
Discharge: HOME | End: 2021-04-28
Payer: COMMERCIAL

## 2021-04-28 PROCEDURE — G0423 INTENS CARDIAC REHAB NO EXER: HCPCS

## 2021-04-28 PROCEDURE — G0422 INTENS CARDIAC REHAB W/EXERC: HCPCS

## 2021-04-28 NOTE — PROGRESS NOTES
Video Education Report - ICR/CR  Name:  Jazmyne Crawford     Date:  4/28/2021  MRN: [id number]     Session #:  32  Session Length: 40 min    Core Videos        []Heart Disease Risk Reduction       []Overview of Pritikin Eating Plan      []Move it          []Calorie Density         []Healthy Minds, Bodies, Hearts        []Label Reading - Part 1       []Metabolic Syndrome and Belly Fat        []How Our Thoughts Can Heal Our Hearts   []Dining Out - Part 1          Exercise      Healthy Mind-Set  []Improving Performance    []Smoking Cessation    []Introduction to [address]  []Aging-Enhancing the Quality of Your Life  []Becoming a Pritikin   []Biology of Weight Control    []Cooking Breakfasts   []Decoding Lab Results    and Snacks  []Diseases of Our Time - Overview   []Cooking Dinner and   []Sleep Disorders     Sides  []Targeting Your Nutrition Priorities   []Healthy Salads &   Dressings  Nutrition      []Cooking Soups and   []Dining Out - Part 2     Desserts  []Fueling a Healthy Body   []Menu Workshop     Overview  [x]Planning Your Eating Strategy   []The Pritikin Solution  []Vitamins and Minerals    Comments:  Video completed, group discussion

## 2021-04-30 ENCOUNTER — HOSPITAL ENCOUNTER (OUTPATIENT)
Dept: CARDIAC REHAB | Age: 66
Setting detail: THERAPIES SERIES
Discharge: HOME | End: 2021-04-30
Payer: COMMERCIAL

## 2021-04-30 PROCEDURE — G0423 INTENS CARDIAC REHAB NO EXER: HCPCS

## 2021-04-30 PROCEDURE — G0422 INTENS CARDIAC REHAB W/EXERC: HCPCS

## 2021-04-30 NOTE — PROGRESS NOTES
Video Education Report - ICR/CR  Name:  Brenda Goyal     Date:  4/30/2021  MRN: [id number]     Session #:  29  Session Length: 40 min    Core Videos        []Heart Disease Risk Reduction       []Overview of Pritikin Eating Plan      []Move it          []Calorie Density         []Healthy Minds, Bodies, Hearts        []Label Reading - Part 1       []Metabolic Syndrome and Belly Fat        []How Our Thoughts Can Heal Our Hearts   []Dining Out - Part 1          Exercise      Healthy Mind-Set  []Improving Performance    []Smoking Cessation    []Introduction to [address]  []Aging-Enhancing the Quality of Your Life  []Becoming a Pritikin   [x]Body compostion    []Cooking Breakfasts   []Decoding Lab Results    and Snacks  []Diseases of Our Time - Overview   []Cooking Dinner and   []Sleep Disorders     Sides  []Targeting Your Nutrition Priorities   []Healthy Salads &   Dressings  Nutrition      []Cooking Soups and   []Dining Out - Part 2     Desserts  []Fueling a Healthy Body   []Menu Workshop     Overview  []Planning Your Eating Strategy   []The Pritikin Solution  []Vitamins and Minerals    Comments:  Video completed, group discussion

## 2021-04-30 NOTE — PLAN OF CARE
Hospital Facility-Based Program  Phase 2 Cardiac Rehab Weekly Progress Report      Patient prescribed exercise:  8:00 class. 3 times per week in rehab, 1-4 times per week at home for the amount of sessions/weeks specified by insurance. Current Levels: Treadmill: 3.0mph/0% for 15 minutes, Schwinn Airdyne: Level 1.9 for 15 minutes,  UBE: Level 1.1 for 5 minutes. Progression Discussion: Maintain Aerobic exercise 15 minutes to work on endurance. Attempt to increase intensity by 5-20% for each modality this week. Try to increase intensities until Cyn Cedillo rates the exercises a 13-17 on Nacho RPE.

## 2021-05-03 ENCOUNTER — HOSPITAL ENCOUNTER (OUTPATIENT)
Dept: CARDIAC REHAB | Age: 66
Setting detail: THERAPIES SERIES
Discharge: HOME | End: 2021-05-03
Payer: COMMERCIAL

## 2021-05-03 PROCEDURE — G0423 INTENS CARDIAC REHAB NO EXER: HCPCS

## 2021-05-03 PROCEDURE — G0422 INTENS CARDIAC REHAB W/EXERC: HCPCS

## 2021-05-03 NOTE — PROGRESS NOTES
Francisco Tovar YOB: 1955    Acct Number: [de-identified]   MRN:  [id number]                             [facility] NUTRITION WORKSHOP             Date: 5/3/2021        Session # _______    Katie Cisneros class covered:    (X)  Label Reading  ()  Menus  ()  Targeting Nutrition Priorities  ()  Fueling a Healthy Body    Readiness to change:    ( ) Pre-contemplative   ( ) Contemplative - ambivalent about change    ( ) Action - ready to set action plan and implement   (X ) Maintenance - has made change and is trying, and or practicing different alternative behaviors       Miguel Montalvo was in the Workshop with the Dietitian for 45 minutes. The content was presented via Powerpoint, lecture, and patient participation based format. Motivational interviewing was utilized when needed, to promote change. Patient voiced understanding.     Electronically signed by Linda Wen RD LD [address]   on 5/3/2021 at 10:27 AM

## 2021-05-05 ENCOUNTER — HOSPITAL ENCOUNTER (OUTPATIENT)
Dept: CARDIAC REHAB | Age: 66
Setting detail: THERAPIES SERIES
Discharge: HOME | End: 2021-05-05
Payer: COMMERCIAL

## 2021-05-05 PROCEDURE — G0423 INTENS CARDIAC REHAB NO EXER: HCPCS

## 2021-05-05 PROCEDURE — G0422 INTENS CARDIAC REHAB W/EXERC: HCPCS

## 2021-05-05 NOTE — PROGRESS NOTES
Video Education Report - ICR/CR  Name:  Zachariah Alarcon     Date:  5/5/2021  MRN: [id number]     Session #:  27  Session Length: 40 min    Core Videos        []Heart Disease Risk Reduction       []Overview of Pritikin Eating Plan      []Move it          []Calorie Density         []Healthy Minds, Bodies, Hearts        []Label Reading - Part 1       []Metabolic Syndrome and Belly Fat        []How Our Thoughts Can Heal Our Hearts   []Dining Out - Part 1      [x]Biomechanical Limitations  []Facts on Fat        n    Exercise      Healthy Mind-Set  []Improving Performance    []Smoking Cessation    []Introduction to [address]  []Aging-Enhancing the Quality of Your Life  []Becoming a Pritikin   []Biology of Weight Control    []Cooking Breakfasts   []Decoding Lab Results    and Snacks  []Diseases of Our Time - Overview   []Cooking Dinner and   []Sleep Disorders     Sides  []Targeting Your Nutrition Priorities   []Healthy Salads &   Dressings  Nutrition      []Cooking Soups and   []Dining Out - Part 2     Desserts  []Fueling a Healthy Body   []Menu Workshop     Overview  []Planning Your Eating Strategy   []The Pritikin Solution  []Vitamins and Minerals    Comments:  Video completed, group discussion

## 2021-05-06 DIAGNOSIS — J30.1 CHRONIC SEASONAL ALLERGIC RHINITIS DUE TO POLLEN: ICD-10-CM

## 2021-05-06 RX ORDER — LEVOCETIRIZINE DIHYDROCHLORIDE 5 MG/1
5 TABLET, FILM COATED ORAL NIGHTLY
Qty: 90 TABLET | Refills: 1 | Status: SHIPPED | OUTPATIENT
Start: 2021-05-06 | End: 2021-11-23

## 2021-05-07 ENCOUNTER — HOSPITAL ENCOUNTER (OUTPATIENT)
Dept: CARDIAC REHAB | Age: 66
Setting detail: THERAPIES SERIES
Discharge: HOME | End: 2021-05-07
Payer: COMMERCIAL

## 2021-05-07 ENCOUNTER — APPOINTMENT (OUTPATIENT)
Dept: CARDIAC REHAB | Age: 66
End: 2021-05-07
Payer: COMMERCIAL

## 2021-05-07 NOTE — PLAN OF CARE
Hospital Facility-Based Program  Phase 2 Cardiac Rehab Weekly Progress Report      Patient prescribed exercise:  8:00 class. 3 times per week in rehab, 1-4 times per week at home for the amount of sessions/weeks specified by insurance. Current Levels: Treadmill: 3. 1mph/3% for 15 minutes, Schwinn Airdyne: Level 1.9 for 15 minutes,  UBE: Level 1.1 for 5 minutes. Progression Discussion: Maintain Aerobic exercise 15 minutes to work on endurance. Attempt to increase intensity by 5-20% for each modality this week. Try to increase intensities until Anupam Power rates the exercises a 13-17 on Nacho RPE.

## 2021-05-10 ENCOUNTER — HOSPITAL ENCOUNTER (OUTPATIENT)
Dept: CARDIAC REHAB | Age: 66
Setting detail: THERAPIES SERIES
Discharge: HOME | End: 2021-05-10
Payer: COMMERCIAL

## 2021-05-10 PROCEDURE — G0422 INTENS CARDIAC REHAB W/EXERC: HCPCS

## 2021-05-10 PROCEDURE — G0423 INTENS CARDIAC REHAB NO EXER: HCPCS

## 2021-05-10 NOTE — PROGRESS NOTES
Monserrat MORROW.:  [birth year]    MRN:  [id number]    Date: 5/10/2021      Session Length:  45 min   Session # _______    EXERCISE WORKSHOP:  Heart Disease Risk Reduction                        Todays class reviewed the anatomy and physiology of the heart. Additionally, we reviewed how the three Pritikin pillars impact risk factors, the progression, and the management of heart disease. Readiness to change:    ( ) Pre-contemplative   ( ) Contemplative - ambivalent about change    ( x) Action - ready to set action plan and implement   ( ) Maintenance - has made change and is trying, and or practicing different alternative behaviors     Additional Notes:      Bibiana Evans was in the Workshop with the Exercise Physiologist for 45 minutes. The content was presented via Powerpoint, lecture, and patient participation based format. Motivational interviewing was utilized when needed, to promote change. Patient voiced understanding.     Electronically signed by Danelle Reno on 5/10/2021 at 8:17 AM

## 2021-05-11 ENCOUNTER — OFFICE VISIT (OUTPATIENT)
Dept: PULMONOLOGY | Age: 66
End: 2021-05-11
Payer: COMMERCIAL

## 2021-05-11 VITALS
SYSTOLIC BLOOD PRESSURE: 116 MMHG | DIASTOLIC BLOOD PRESSURE: 70 MMHG | WEIGHT: 190.6 LBS | BODY MASS INDEX: 30.63 KG/M2 | OXYGEN SATURATION: 98 % | TEMPERATURE: 97.7 F | HEART RATE: 64 BPM | HEIGHT: 66 IN

## 2021-05-11 DIAGNOSIS — Z99.89 OSA ON CPAP: Primary | ICD-10-CM

## 2021-05-11 DIAGNOSIS — E66.9 OBESITY (BMI 30-39.9): ICD-10-CM

## 2021-05-11 DIAGNOSIS — G47.33 OSA ON CPAP: Primary | ICD-10-CM

## 2021-05-11 PROCEDURE — 99214 OFFICE O/P EST MOD 30 MIN: CPT | Performed by: NURSE PRACTITIONER

## 2021-05-11 ASSESSMENT — ENCOUNTER SYMPTOMS
STRIDOR: 0
EYES NEGATIVE: 1
RESPIRATORY NEGATIVE: 1
ALLERGIC/IMMUNOLOGIC NEGATIVE: 1
NAUSEA: 0
CHEST TIGHTNESS: 0
VOMITING: 0
WHEEZING: 0
GASTROINTESTINAL NEGATIVE: 1
DIARRHEA: 0

## 2021-05-11 NOTE — PROGRESS NOTES
[facility]      Arian Frankel         [id number]  5/11/2021   Chief Complaint   Patient presents with    Follow-up     MELVIN 8-10 week follow up with a [facility] pap download        Pt of Dr. Mia De La Torre    PAP Download:   Original or initial AHI: 36.4     Date of initial study: 01/06/2021      Compliant  100%     Noncompliant 0 %     PAP Type AutoSet Level  7   Avg Hrs/Day 6 hours 23 minutes  AHI: 9.4   Recorded compliance dates , 04/10/2021  to 05/09/2021   Machine/Mfg:   [x] ResMed    [] Respironics/Dreamstation   Interface:   [] Nasal    [] Nasal pillows   [x] FFM      Provider:      [x] [facility]     [][employer]     [] [employer]    [] Leroy Reno    [] [employer]               [] [employer]      [] [employer]    [] [address]      [] Other    Neck Size: 18  Mallampati Mallampati 2  ESS:  7  SAQLI: 88    Here is a scan of the most recent download:                       12/22/2020   ECHOCARDIOGRAM COMPLETE 2D W DOPPLER W COLOR. Conclusions      Summary   limited echo   Ejection fraction is visually estimated at 60%. Overall left ventricular function is normal.   No evidence of any pericardial effusion. Signature      ----------------------------------------------------------------   Electronically signed by Mick Schirmer MD (Interpreting   physician) on 12/27/2020 at 12:07 PM      Presentation:   Constantin Austin presents for sleep medicine follow up for obstructive sleep apnea  Since the last visit, Constantin Austin has been compliant with CPAP machine but today's AHI is 9.4 with increase seen in central sleep apnea. Patient is adjusting well to PAP therapy  ECHO reviewed will change patient to APAP 6-10 with DL in 2 weeks    Equipment issues: The pressure is not  acceptable, the mask is acceptable     Sleep issues:  Do you feel better? Yes  More rested? Yes   Better concentration? yes    Progress History:   Since last visit any new medical issues? No  New ER or hospital visits?  No  Any new or changes in medicines? No  Any new sleep medicines? No    Review of Systems -   Review of Systems   Constitutional: Negative. Negative for chills, fever and unexpected weight change. HENT: Negative. Eyes: Negative. Respiratory: Negative. Negative for chest tightness, wheezing and stridor. Cardiovascular: Negative for chest pain and leg swelling. Gastrointestinal: Negative. Negative for diarrhea, nausea and vomiting. Endocrine: Negative. Genitourinary: Negative. Negative for dysuria. Musculoskeletal: Negative. Skin: Negative. Allergic/Immunologic: Negative. Neurological: Negative. Hematological: Negative. Psychiatric/Behavioral: Negative. Physical Exam:    BMI:  Body mass index is 30.76 kg/m². Wt Readings from Last 3 Encounters:   05/11/21 190 lb 9.6 oz (86.5 kg)   03/15/21 192 lb (87.1 kg)   03/10/21 192 lb (87.1 kg)     Weight stable / unchanged  Vitals: /70 (Site: Left Upper Arm, Position: Sitting, Cuff Size: Medium Adult)   Pulse 64   Temp 97.7 °F (36.5 °C) (Oral)   Ht 5' 6\" (1.676 m)   Wt 190 lb 9.6 oz (86.5 kg)   SpO2 98% Comment: on room air at rest  BMI 30.76 kg/m²       Physical Exam  Vitals signs and nursing note reviewed. Constitutional:       General: He is not in acute distress. Appearance: He is well-developed. HENT:      Head: Normocephalic and atraumatic. Neck:      Trachea: No tracheal deviation. Cardiovascular:      Rate and Rhythm: Normal rate and regular rhythm. Heart sounds: Normal heart sounds. No murmur. Pulmonary:      Effort: Pulmonary effort is normal. No respiratory distress. Breath sounds: Normal breath sounds. No stridor. No wheezing or rales. Chest:      Chest wall: No tenderness. Abdominal:      General: Bowel sounds are normal. There is no distension. Palpations: Abdomen is soft. Skin:     General: Skin is warm and dry.       Capillary Refill: Capillary refill takes less than 2 seconds. Neurological:      Mental Status: He is alert and oriented to person, place, and time. Psychiatric:         Behavior: Behavior normal.         Thought Content: Thought content normal.         Judgment: Judgment normal.           ASSESSMENT/DIAGNOSIS     Diagnosis Orders   1. MELVIN on CPAP  CPAP Machine MISC   2. Obesity (BMI 30-39. 9)              Plan   Do you need any equipment today?  No    -Will change to APAP with pressures set at 6-10 cm H2O with DL in 2 weeks  -Good sleep hygiene discussed  -Patient to try and obtain 7-9 hours of sleep at night  -RTC in 3 months with DL    Electronically signed by SAI Collins CNP on 5/11/2021 at 11:55 AM

## 2021-05-12 ENCOUNTER — HOSPITAL ENCOUNTER (OUTPATIENT)
Dept: CARDIAC REHAB | Age: 66
Setting detail: THERAPIES SERIES
Discharge: HOME | End: 2021-05-12
Payer: COMMERCIAL

## 2021-05-12 PROCEDURE — G0422 INTENS CARDIAC REHAB W/EXERC: HCPCS

## 2021-05-12 PROCEDURE — G0423 INTENS CARDIAC REHAB NO EXER: HCPCS

## 2021-05-12 NOTE — PROGRESS NOTES
Video Education Report - ICR/CR  Name:  Jon York     Date:  5/12/2021  MRN: [id number]     Session #:  28  Session Length: 40 min    Core Videos        []Heart Disease Risk Reduction       []Overview of Pritikin Eating Plan      []Move it          []Calorie Density         []Healthy Minds, Bodies, Hearts        []Label Reading - Part 1       []Metabolic Syndrome and Belly Fat        []How Our Thoughts Can Heal Our Hearts   []Dining Out - Part 1          Exercise      Healthy Mind-Set  []Improving Performance    []Smoking Cessation    []Introduction to [address]  [x]Aging-Enhancing the Quality of Your Life  []Becoming a Pritikin   []Biology of Weight Control    []Cooking Breakfasts   []Decoding Lab Results    and Snacks  []Diseases of Our Time - Overview   []Cooking Dinner and   []Sleep Disorders     Sides  []Targeting Your Nutrition Priorities   []Healthy Salads &   Dressings  Nutrition      []Cooking Soups and   []Dining Out - Part 2     Desserts  []Fueling a Healthy Body   []Menu Workshop     Overview  []Planning Your Eating Strategy   []The Pritikin Solution  []Vitamins and Minerals    Comments:  Video completed, group discussion

## 2021-05-13 NOTE — PLAN OF CARE
Hospital Facility-Based Program  Phase 2 Cardiac Rehab Weekly Progress Report      Patient prescribed exercise:  8:00 class. 3 times per week in rehab, 1-4 times per week at home for the amount of sessions/weeks specified by insurance. Current Levels: Treadmill: 3. 2mph/3% for 15 minutes, Schwinn Airdyne: Level 1.9 for 15 minutes,  UBE: Level 1.2 for 5 minutes. Progression Discussion: Maintain Aerobic exercise 15 minutes to work on endurance. Attempt to increase intensity by 5-20% for each modality this week. Try to increase intensities until Miguel Montalvo rates the exercises a 13-17 on Nacho RPE.

## 2021-05-14 ENCOUNTER — HOSPITAL ENCOUNTER (OUTPATIENT)
Dept: CARDIAC REHAB | Age: 66
Setting detail: THERAPIES SERIES
Discharge: HOME | End: 2021-05-14
Payer: COMMERCIAL

## 2021-05-14 ENCOUNTER — APPOINTMENT (OUTPATIENT)
Dept: CARDIAC REHAB | Age: 66
End: 2021-05-14
Payer: COMMERCIAL

## 2021-05-17 ENCOUNTER — HOSPITAL ENCOUNTER (OUTPATIENT)
Dept: CARDIAC REHAB | Age: 66
Setting detail: THERAPIES SERIES
Discharge: HOME | End: 2021-05-17
Payer: COMMERCIAL

## 2021-05-17 PROCEDURE — G0423 INTENS CARDIAC REHAB NO EXER: HCPCS

## 2021-05-17 PROCEDURE — G0422 INTENS CARDIAC REHAB W/EXERC: HCPCS

## 2021-05-17 NOTE — PROGRESS NOTES
Video Education Report - ICR/CR  Name:  Monserrat Rutledge     Date:  5/17/2021  MRN: [id number]     Session #:  35  Session Length: 40 min    Core Videos        []Heart Disease Risk Reduction       []Overview of Pritikin Eating Plan      []Move it          []Calorie Density         []Healthy Minds, Bodies, Hearts        []Label Reading - Part 1       []Metabolic Syndrome and Belly Fat        []How Our Thoughts Can Heal Our Hearts     [x]Nutrition Action Plan    []Exercise Action Plan    Exercise      Healthy Mind-Set  []Improving Performance    []Smoking Cessation    []Introduction to [address]  []Aging-Enhancing the Quality of Your Life  []Becoming a Pritikin   []Biology of Weight Control    []Cooking Breakfasts   []Decoding Lab Results    and Snacks  []Diseases of Our Time - Overview   []Cooking Dinner and   []Sleep Disorders     Sides  []Targeting Your Nutrition Priorities   []Healthy Salads &   Dressings  Nutrition      []Cooking Soups and   []Dining Out - Part 2     Desserts  []Fueling a Healthy Body   []Menu Workshop     Overview  []Planning Your Eating Strategy   []The Pritikin Solution  []Vitamins and Minerals    Comments:  Video completed, group discussion

## 2021-05-18 ENCOUNTER — TELEPHONE (OUTPATIENT)
Dept: CARDIOLOGY CLINIC | Age: 66
End: 2021-05-18

## 2021-05-19 ENCOUNTER — HOSPITAL ENCOUNTER (OUTPATIENT)
Dept: CARDIAC REHAB | Age: 66
Setting detail: THERAPIES SERIES
Discharge: HOME | End: 2021-05-19
Payer: COMMERCIAL

## 2021-05-19 ENCOUNTER — TELEPHONE (OUTPATIENT)
Dept: CARDIOLOGY CLINIC | Age: 66
End: 2021-05-19

## 2021-05-19 ENCOUNTER — HOSPITAL ENCOUNTER (OUTPATIENT)
Dept: CARDIAC REHAB | Age: 66
Setting detail: THERAPIES SERIES
End: 2021-05-19
Payer: COMMERCIAL

## 2021-05-19 PROCEDURE — G0422 INTENS CARDIAC REHAB W/EXERC: HCPCS

## 2021-05-19 NOTE — TELEPHONE ENCOUNTER
[facility] [employer] [facility]  Pt of José / Karla    5/18/21 0024 : 3.4 second pause

## 2021-05-20 ENCOUNTER — NURSE ONLY (OUTPATIENT)
Dept: LAB | Age: 66
End: 2021-05-20

## 2021-05-20 DIAGNOSIS — Z00.00 ROUTINE GENERAL MEDICAL EXAMINATION AT HEALTH CARE FACILITY: ICD-10-CM

## 2021-05-20 LAB
ALBUMIN SERPL-MCNC: 4.4 G/DL (ref 3.5–5.1)
ALP BLD-CCNC: 63 U/L (ref 38–126)
ALT SERPL-CCNC: 28 U/L (ref 11–66)
ANION GAP SERPL CALCULATED.3IONS-SCNC: 12 MEQ/L (ref 8–16)
AST SERPL-CCNC: 29 U/L (ref 5–40)
BILIRUB SERPL-MCNC: 0.9 MG/DL (ref 0.3–1.2)
BUN BLDV-MCNC: 18 MG/DL (ref 7–22)
CALCIUM SERPL-MCNC: 9.8 MG/DL (ref 8.5–10.5)
CHLORIDE BLD-SCNC: 104 MEQ/L (ref 98–111)
CHOLESTEROL, TOTAL: 119 MG/DL (ref 100–199)
CO2: 28 MEQ/L (ref 23–33)
CREAT SERPL-MCNC: 0.8 MG/DL (ref 0.4–1.2)
ERYTHROCYTE [DISTWIDTH] IN BLOOD BY AUTOMATED COUNT: 11.9 % (ref 11.5–14.5)
ERYTHROCYTE [DISTWIDTH] IN BLOOD BY AUTOMATED COUNT: 41.1 FL (ref 35–45)
GFR SERPL CREATININE-BSD FRML MDRD: > 90 ML/MIN/1.73M2
GLUCOSE FASTING: 88 MG/DL (ref 70–108)
HCT VFR BLD CALC: 40.8 % (ref 42–52)
HDLC SERPL-MCNC: 49 MG/DL
HEMOGLOBIN: 13.3 GM/DL (ref 14–18)
LDL CHOLESTEROL CALCULATED: 61 MG/DL
MCH RBC QN AUTO: 30.9 PG (ref 26–33)
MCHC RBC AUTO-ENTMCNC: 32.6 GM/DL (ref 32.2–35.5)
MCV RBC AUTO: 94.9 FL (ref 80–94)
PLATELET # BLD: 241 THOU/MM3 (ref 130–400)
PMV BLD AUTO: 9.1 FL (ref 9.4–12.4)
POTASSIUM SERPL-SCNC: 4.7 MEQ/L (ref 3.5–5.2)
PROSTATE SPECIFIC ANTIGEN: 0.52 NG/ML (ref 0–1)
RBC # BLD: 4.3 MILL/MM3 (ref 4.7–6.1)
SODIUM BLD-SCNC: 144 MEQ/L (ref 135–145)
TOTAL PROTEIN: 6.9 G/DL (ref 6.1–8)
TRIGL SERPL-MCNC: 46 MG/DL (ref 0–199)
TSH SERPL DL<=0.05 MIU/L-ACNC: 1.4 UIU/ML (ref 0.4–4.2)
WBC # BLD: 4.2 THOU/MM3 (ref 4.8–10.8)

## 2021-05-21 NOTE — PLAN OF CARE
Hospital Facility-Based Program  Phase 2 Cardiac Rehab Weekly Progress Report      Patient prescribed exercise:  8:00 class. 3 times per week in rehab, 1-4 times per week at home for the amount of sessions/weeks specified by insurance. Current Levels: Treadmill: 3. 2mph/3% for 15 minutes, Schwinn Airdyne: Level 1.9 for 15 minutes,  UBE: Level 1.3 for 5 minutes. Progression Discussion: Maintain Aerobic exercise 15 minutes to work on endurance. Attempt to increase intensity by 5-20% for each modality this week. Try to increase intensities until Duyen Mcclellan rates the exercises a 13-17 on Nacho RPE.

## 2021-05-24 ENCOUNTER — TELEPHONE (OUTPATIENT)
Dept: CARDIOLOGY CLINIC | Age: 66
End: 2021-05-24

## 2021-05-24 ENCOUNTER — PROCEDURE VISIT (OUTPATIENT)
Dept: CARDIOLOGY CLINIC | Age: 66
End: 2021-05-24
Payer: COMMERCIAL

## 2021-05-24 ENCOUNTER — HOSPITAL ENCOUNTER (OUTPATIENT)
Dept: CARDIAC REHAB | Age: 66
Setting detail: THERAPIES SERIES
Discharge: HOME | End: 2021-05-24
Payer: COMMERCIAL

## 2021-05-24 DIAGNOSIS — R00.2 PALPITATION: Primary | ICD-10-CM

## 2021-05-24 PROCEDURE — G0422 INTENS CARDIAC REHAB W/EXERC: HCPCS

## 2021-05-24 PROCEDURE — G2066 INTER DEVC REMOTE 30D: HCPCS | Performed by: INTERNAL MEDICINE

## 2021-05-24 PROCEDURE — 93298 REM INTERROG DEV EVAL SCRMS: CPT | Performed by: INTERNAL MEDICINE

## 2021-05-24 PROCEDURE — G0423 INTENS CARDIAC REHAB NO EXER: HCPCS

## 2021-05-24 NOTE — PLAN OF CARE
[address] [place] [address] Facility-Based Program  Individualized Cardiac Treatment Plan    Patient Name:  Manjinder Capone  :  1955  Age:  60 y.o. MRN:  [id number]  Diagnosis: PCI  Date of Event: 21   Physician:  José  Next Office Visit:    Date Entered Program: 21  Risk Stratifications: [x] Low [] Intermediate [] High  Allergies: No Known Allergies    COVID -19 Screen  Do you have any of the following symptoms:  [] Fever [] Cough [] SOB [] Muscle/Body Ache [] Loss of taste/smell [x] None    Have you traveled outside of the [place]? [] Yes      [x] No    Have you been around anyone who has tested Positive for COVID 19?  [] Yes      [x] No    The following Education has been completed with patient. [x] Wait in the lobby until we call you back to rehab. [x] You must wear a mask while in the medical center, and in cardiac rehab. Please bring your own. [x] Bring your own bottle of water with you. [x] You can bring a visitor with you, however, they will need to sit in the waiting room while you are in cardiac rehab.     Individual Cardiac Treatment Plan -EXERCISE  INITIAL 30 DAY 60 DAY 90 DAY FINAL DAY   EXERCISE  ASSESSMENT/PLAN EXERCISE  REASSESSMENT EXERCISE   REASSESSMENT EXERCISE   REASSESSMENT EXERCISE  DISCHARGE/FOLLOW-UP   Date: 21 Date:3/1/2021 Date:3/31/2021 Date:2021 Date:2021   Session #1 Session # 16 Session # 29 Session # 46 Session # 67  Last session completed on    Stages of Change Stages of Change Stages of Change Stages of Change Stages of Change   [] Pre Contemplation  [] Contemplation  [] Preparation  [x] Action  [] Maintenance  [] Relapse [] Pre Contemplation  [] Contemplation  [] Preparation  [x] Action  [] Maintenance  [] Relapse [] Pre Contemplation  [] Contemplation  [] Preparation  [x] Action  [] Maintenance  [] Relapse [] Pre Contemplation  [] Contemplation  [] Preparation  [x] Action  [] Maintenance  [] week  Duration: 60 min   Angina with Activity? [] Yes    [x] No  Angina Management: na Angina with Activity? [x] Yes    [] No  Angina Management: states had only once when had monitor on. Rested Angina with Activity? [x] Yes    [] No  Angina Management: rest Angina with Activity? [x] Yes    [] No  Angina Management: rest Angina with Activity?   [] Yes    [x] No  Angina Management: rest   EXERCISE PLAN EXERCISE PLAN EXERCISE PLAN EXERCISE PLAN EXERCISE PLAN   *Interventions* *Interventions* *Interventions* *Interventions* *Interventions*   Exercise Prescription  (per physician & CR staff) Exercise Prescription  (per physician & CR staff) Exercise Prescription  (per physician & CR staff) Exercise Prescription  (per physician & CR staff) Exercise Prescription  (per physician & CR staff)   Cardiovascular Cardiovascular Cardiovascular Cardiovascular Cardiovascular   Mode:    [x] Treadmill (TM)  [x] Schwinn Airdyne (AD)  [x] Arms Ergometer (AE)  [] NuStep  [] Elliptical (E) MODE:    [x] Treadmill (TM)  [x] Schwinn Airdyne (AD)  [x] Arms Ergometer (AE)  [] NuStep  [] Elliptical (E) MODE:    [x] Treadmill (TM)  [x] Schwinn Airdyne (AD)  [x] Arms Ergometer (AE)  [] NuStep  [] Elliptical (E) MODE:    [x] Treadmill (TM)  [x] Schwinn Airdyne (AD)  [x] Arms Ergometer (AE)  [] NuStep  [] Elliptical (E) MODE:    [x] Treadmill (TM)  [x] Schwinn Airdyne (AD)  [x] Arms Ergometer (AE)  [] NuStep  [] Elliptical (E)   Initial Workloads  TM: [email or web address] METs  AD: 1.0 level = 3 METs    AE: 0.5 level = 2 METs Current Workloads  TM: 2.8 @ %=3.2  METs  AD:1.2  level = 2.9 METs  AE:0.6  level = 2 METs Current Workloads  TM: 2.8 @3 %= 4.3 METs  AD: 1.7 level = 3.7 METs  AE:0.8  level = 2.3 METs Current Workloads  TM: 3 @3 %= 4.6 METs  AD: 1.9 level = 4.1 METs  AE: 1.1 level = 2.9 METs Current Workloads  TM: 3.2 @3 %= 4.8 METs  AD:1.9  level = 4.1 METs    AE: 1.3 level = 3.2 METs     Frequency:    ICR: 3x/week  Home: 2-3x/wk Frequency:   ICR: 3x/week  Home: 3x/wk Frequency:  ICR: 3x/week  Home: 3-4x/wk Frequency:  ICR: 3x/week  Home: 3-4x/wk Frequency:  Dilip Chneg will continue exercise at  5-7 days/week   Duration:   Total aerobic exercise = 30-45 min    5-8 min/mode Duration:  Total aerobic exercises = 29 min     12 min/mode Duration:  Total aerobic exercises = 35 min     15 min/mode Duration:  Total aerobic exercises = 35 min     15 min/mode Duration:  Total erobic exercise =  60-90 min   Intensity:   MET Level = 3  RPE = 12-15 Intensity:  Max MET Level = 3.2  RPE = 12-15 Intensity:  Max MET Level = 4.3  RPE = 12-15 Intensity:  Max MET Level =4.6  RPE = 12-15 Intensity:  Max MET Level = 4.8 RPE = 12-15   Progression: increase aerobic activity up to 15 min over next 4 weeks by increasing time 2-3 min/week. Progression:Increase to 15 min then increase METS 5-10% over next 4 weeks   Progression: Increase METS 5-10 % over next 4 weeks   Progression: increase METS 5-10 % over next 4 weeks Progression:  Increase time/intensity when RPE <13, and HR is in [facility]   [x] Yes      [] No  Upper and Lower body strength training 2x/wk    Wt: 5#       Reps:  8-15    *Increase wt. after completing 15 reps with an RPE of <12/13. [x] Yes      [] No  Upper and Lower body strength training 2x/wk    Wt: 5#       Reps:  8-15    *Increase wt. after completing 15 reps with an RPE of <12/13. [x] Yes      [] No  Upper and Lower body strength training 2x/wk    Wt: 5#       Reps:  8-15    *Increase wt. after completing 15 reps with an RPE of <12/13. [x] Yes      [] No  Upper and Lower body strength training 2x/wk    Wt: 5#       Reps:  8-15    *Increase wt. after completing 15 reps with an RPE of <12/13. Continue Strength Training at home   [x] Exercise Log & Strength training handout given     Wt: 7#       Reps:  8-15    *Increase wt. after completing 15 reps with an RPE of <12/13. Flexibility Flexibility Flexibility Flexibility Flexibility   [x] Yes      [] No  25 min session of Core Strength & Flexibility 1x/per week  Attends Core Strength & Flexibility   [x] Yes      [] No Attends Core Strength & Flexibility   [x] Yes      [] No Attends Core Strength & Flexibility   [x] Yes      [] No Continue Core Strength & Flexibility at home   Home Exercise  *Constantin Austin verbalizes planning to bike, walk, elliptical 3-4 days/week for 60 min on days not in rehab. Home Exercise  *Freddy verbalizes planning to bike/walk days/week for 3-4 min on days not in rehab. Home Exercise  *Freddy verbalizes planning to walk/ellipitcal days/week for 3-4 45-60 min on days not in rehab. Home Exercise  *Freddy verbalizes planning to walk/ellipitcal days/week for 3-4 min on days not in rehab. Home Exercise  *Adriel Paiz his plan to walk/bike 3-4 days/week for 45-60 min @ home   *Education* *Education* *Education* *Education* *Education*   RPE Scale  [x] Yes      [] No  Exercise Safety  [x] Yes      [] No  Equipment Orientation  [x] Yes      [] No  S/S to Report  [x] Yes      [] No  Warm Up/Cool Down  [x] Yes      [] No  Home Exercise  [x] Yes      [] No    All Exercise Education Completed  [] Yes      [] No   Exercise Education Recommended    Workshops  [x] Improving Performance  [x] Balance Training & Fall Prevention  [x] Intro to Yoga - Video  [x] Resistance Training & Core Strength Exercise Education Attended/Date  2/15 core strength Exercise Education Attended/Date  3/29 balance Exercise Education Attended/Date  4/21 resistance All Sessions Completed?     [x] Yes  [] No  5/10 heart risk   *Goals* *Goals* *Goals* *Goals* *Goals*   Initial Exercise Goals Exercise Goals  Exercise Goals   Exercise Goals  Exercise Goals   Freddy plans to:  [x] Attend exercise sessions 3x/wk  [x] initiate home exercise 2-3x/wk for 10-20 min  [x] Increase 6 min walk distance by 10%  [x] Attend Exercise workshops [employer] to:  [x] Attend exercise sessions 3x/wk  [x] continue home exercise 2-3x/wk for 20-30 min  [x] Attend Exercise workshops Freddy's plans to:  [x] Attend exercise sessions 3x/wk  [x] continue home exercise 3-4x/wk for 30-45 min  [x] Determine plan of exercise following rehab  [x] Attend Exercise workshops Freddy's plans to:  [x] Attend exercise sessions 3x/wk  [x] continue home exercise 3-4x/wk for 30-45 min  [x] Determine plan of exercise following rehab  [x] Attend Exercise workshops Dilip Cheng achieved exercise goals? [x]  Yes    [] No    [x] Increased 6 min walk distance by 10%  [x] Currently exercising 30-60 min/day, 5-7days/wk   [x] Plans to continue exercise on own     Return to ADL or Hobbies:  Dilip Cheng would like to improve strength and endurance so he is able to return to hunt, cross bow, shot gun, household chores Return to ADL or Hobbies:  Dilip Cheng would like to improve strength and endurance so he is able to return to shot gun Return to ADL or Hobbies:  Dilip Cheng would like to improve strength and endurance so he is able to return to hunting. Return to ADL or Hobbies:  Dilip Cheng would like to improve strength and endurance so he is able to return to hunting. Return to ADL or Hobbies:  Dilip Cheng has  improved strength and endurance so he is able to return to hunt and cross bow   *MET level required for above goal:  6-7 METs MET level Achieved:  3.2 METs MET level Achieved:  4. 3METs MET level Achieved:  4.6 METs MET level Achieved:  4.8 METs     Individual Cardiac Treatment Plan - Nutrition  NUTRITION  ASSESSMENT/PLAN NUTRITION  REASSESSMENT NUTRITION   REASSESSMENT NUTRITION   REASSESSMENT NUTRITION  DISCHARGE/FOLLOW-UP   Stages of Change Stages of Change Stages of Change Stages of Change Stages of Change   [x] Pre Contemplation  [] Contemplation  [] Preparation  [] Action  [] Maintenance  [] Relapse [] Pre Contemplation  [x] Contemplation  [] Preparation  [] Action  [] Maintenance  [] Relapse [] Pre Contemplation  [] Contemplation  [x] Preparation  [] Action  [] Maintenance  [] Relapse [] Pre Contemplation  [] Contemplation  [] Preparation  [x] Action  [] Maintenance  [] Relapse [] Pre Contemplation  [] Contemplation  [] Preparation  [x] Action  [] Maintenance  [] Relapse   NUTRITION ASSESSMENT NUTRITION ASSESSMENT NUTRITION ASSESSMENT NUTRITION ASSESSMENT NUTRITION ASSESSMENT   Weight Management  Weight: 195.8        Height: 66\"   BMI: 31.7  Weight Management  Weight: 195.8                  Weight Management  Weight: 195.8                Weight Management  Weight: 186. 2 Weight Management  Weight: 186.2                    BMI: 31.3   Eating Plan  Current eating habits: nothing in particular, portion control Eating Plan  Changes:less salt portion control Eating Plan  Changes:portion control and less sodium Eating Plan  Changes: portion control and less salt Eating Plan Improvements:less red meat   Alcohol Use  [] none          [] daily  [x] weekly      [] special   Type: beer, wiskey  Amount: 4+       Diet Assessment Tool:  RATE YOUR PLATE  *Given to patient to complete and return. Diet Assessment Tool:    Score:45 /69       Diet Assessment Tool: RATE YOUR PLATE  Score: 38/41   NUTRITION PLAN NUTRITION PLAN NUTRITION PLAN NUTRITION PLAN NUTRITION PLAN   *Interventions* *Interventions* *Interventions* *Interventions* *Interventions*   Initial Survey given Goal Setting Discussion:   [x] Yes      [] No       Follow Up Survey Reviewed & Goals Updated:     Professional Referral  Please check if needed. [] Dietitian Consult   [] Wt. Management Referral  [] Other:  Professional Referral  Please check if needed. [] Dietitian Consult   [] Wt. Management Referral  [] Other: Professional Referral  Please check if needed. [] Dietitian Consult   [] Wt. Management Referral  [] Other: Professional Referral  Please check if needed. [] Dietitian Consult   [] Wt.  Management Referral  [] Other: Professional Referral  Please check if needed. [] Dietitian Consult   [] Wt. Management Referral  [] Other:   *Education* *Education* *Education* *Education* *Education*   Nutritional Education Recommended    [x] 1:1 Registered Dietitian    Workshops  [x] Label Reading   [x] Menu  [x] Targeting Nutrition Priorities  [x] Fueling a Healthy Body   Nutritional Education Attended/Date  2/8 label reading  Nutritional Education Attended/Date  3/22 1:1 dietician   3/22 [employer]. Nutritional Education Attended/Date  4/12 fueling  All Sessions Completed? [x] Yes  [] No  5/3 label reading   5/24 menu   Cooking School  Recommended     [x] Adding Flavor  [x] Fast & Healthy     Breakfasts  [x] Salads & Dressings  [x] Savory Soups  [x] Simple Sides & Sauces  [x] Appetizers &     Snacks  [x] Delicious Desserts  [x] Plant-Based Proteins  [x] Fast Evening Meals  [x] Weekend Breakfasts  [x] Efficiency Cooking  [x] One-Pot [employer]  Sessions Completed see list   Cooking School  Sessions Completed see list Cooking School  Sessions Completed see list     Cooking School    # of sessions completed:  See list   *Goals* *Goals* *Goals* *Goals* *Goals*   Freddy's nutritional goals are as follows:  Complete and return diet survey Freddy's nutritional goals are as follows:  [x] Attend Nutrition Workshops  [x] Attend 1:1   [] Attend Cooking Classes  [] ** Freddy's nutritional goals are as follows:  [x] Attend Nutrition Workshops  [] Attend 1:1   [] Attend Cooking Classes  [x] Complete and return diet survey  [] ** Freddy's nutritional goals are as follows:  [x] Attend Nutrition Workshops  [] Attend 1:1   [] Attend Cooking Classes  [] ** Freddy achieved nutritional goals   [x] Yes    [] No  If no, why?   Use knowledge gained to continue Pritikin eating plan at home       Individual Cardiac Treatment Plan - Psychosocial  PSYCHOSOCIAL  ASSESSMENT/PLAN PSYCHOSOCIAL  REASSESSMENT PSYCHOSOCIAL   REASSESSMENT PSYCHOSOCIAL   REASSESSMENT PSYCHOSOCIAL  DISCHARGE/FOLLOW-UP   Stages of Change Stages of Change Stages of Change Stages of Change Stages of Change   [] Pre Contemplation  [x] Contemplation  [] Preparation  [] Action  [] Maintenance  [] Relapse [] Pre Contemplation  [x] Contemplation  [] Preparation  [] Action  [] Maintenance  [] Relapse [] Pre Contemplation  [] Contemplation  [x] Preparation  [] Action  [] Maintenance  [] Relapse [] Pre Contemplation  [] Contemplation  [] Preparation  [x] Action  [] Maintenance  [] Relapse [] Pre Contemplation  [] Contemplation  [] Preparation  [x] Action  [] Maintenance  [] Relapse   PSYCHOSOCIAL ASSESSMENT PSYCHOSOCIAL ASSESSMENT PSYCHOSOCIAL ASSESSMENT PSYCHOSOCIAL ASSESSMENT PSYCHOSOCIAL ASSESSMENT   Behavioral Outcomes Behavioral Outcomes Behavioral Outcomes Behavioral Outcomes Behavioral Outcomes   Tool Used:  Cynthia Franco, Quality of Life Index, Cardiac Version IV  *Given to patient to complete. Tool Used:    Cynthia Franco, Quality of Life Index, Cardiac Version IV     QOL Index Score: 27.03  HF:26.13  S&E:26.67  P&S: 27.14  Family: 30   Tool Used:     Cynthia Franco, Quality of Life Index, Cardiac Version IV    QOL Index Score: 24.44  HF:23.93  S&E:26.21  P&S: 22.71  Family: 25.9   PHQ-9 score 2  Depression Severity  [x]Minimal  []Mild   []Moderate  []Moderately Severe  []Severe    PHQ-9 score 2  Depression Severity  [x]Minimal  []Mild   []Moderate  []Moderately Severe []Severe   Does patient have Family Support? [x] Yes      [] No  No signs of marital/family distress       Within the Past Month:  *Have you wished you were dead or wished you could go to sleep and not wake up? [] Yes      [x] No  *Have you had any thoughts of killing yourself?   [] Yes      [x] No         Using a scale of 0-10, 0=none, 10=very:   Rate your depression: 0  Rate your anxiety:  2  Using a scale of 0-10, 0=none, 10=very:   Rate your depression: 0  Rate your anxiety:  2 Using a scale of 0-10, 0=none, 10=very: Rate your depression: 0  Rate your anxiety:  2 Using a scale of 0-10, 0=none, 10=very:   Rate your depression: 2  Rate your anxiety:  3 Using a scale of 0-10, 0=none, 10=very:   Rate your depression: 2  Rate your anxiety:  2   Signs and Symptoms of Depression Present? [] Yes      [x] No   Signs and Symptoms of Depression Present? [] Yes      [x] No  If yes, please explain:  ** Signs and Symptoms of Depression Present? [] Yes      [x] No  If yes, please explain:   Signs and Symptoms of Depression Present? [] Yes      [x] No  If yes, please explain:  ** Signs and Symptoms of Depression Present? [] Yes      [x] No  If yes, please explain:     Signs and Symptoms of Anxiety Present? [] Yes      [x] No   Signs and Symptoms of Anxiety Present? [] Yes      [x] No  If yes, please explain:  ** Signs and Symptoms of Anxiety Present? [] Yes      [x] No  If yes, please explain:   Signs and Symptoms of Anxiety Present? [] Yes      [x] No  If yes, please explain:  ** Signs and Symptoms of Anxiety Present? [] Yes      [x] No  If yes, please explain:     [] Patient has poor eye contact   [] Flat affect present. [] Signs of anxiety, anger or hostility    [] Signs social isolation present.    []  Signs of alcohol or substance abuse       PSYCHOSOCIAL PLAN PSYCHOSOCIAL PLAN PSYCHOSOCIAL PLAN PSYCHOSOCIAL PLAN PSYCHOSOCIAL PLAN   *Interventions* *Interventions* *Interventions* *Interventions* *Interventions*   *Please check if needed  [] Psych Consult  [] Physician Referral  [x] Stress Management Skills *Please check if needed  [] Psych Consult  [] Physician Referral  [x] Stress Management Skills *Please check if needed  [] Psych Consult  [] Physician Referral  [x] Stress Management Skills *Please check if needed  [] Psych Consult  [] Physician Referral  [x] Stress Management Skills *Please check if needed  [] Psych Consult  [] Physician Referral  [x] Stress Management Skills   Is patient currently taking anti-depressant or anti-anxiety medications? [] Yes      [x] No   Change in anti-depressant or anti-anxiety medications? [] Yes      [x] No  If yes, please list medications:   Change in anti-depressant or anti-anxiety medications? [] Yes      [x] No  If yes, please list medications:  ** Change in anti-depressant or anti-anxiety medications? [] Yes      [x] No  If yes, please list medications:  ** Change in anti-depressant or anti-anxiety medications? [] Yes      [x] No  If yes, please list medications:     *Education* *Education* *Education* *Education* *Education*   Healthy Mind-Set Workshops Recommended  [x] Stress & Health  [x] Taking Charge of Stress  [x] New Thoughts, New Behaviors  [x] Managing Moods & Relationships Healthy Mind-Set Workshops Attended/Date  2/17Stress and health Healthy Mind-Set Workshops Attended/Date  3/17 taking charge stress Healthy Mind-Set Workshops Attended/Date   4/23 Manage moods   Healthy Mind-Set Workshops  Completed  [x] Yes      [] No   *Goals* *Goals* *Goals* *Goals* *Goals*   Freddy's psychosocial goals are as follows:  Complete HADS & Cynthia & Salvador, Quality of Life Index, Cardiac Version IV Freddy's psychosocial goals are as follows:  [x] Attend Healthy Mind-Set Workshops  [x] Reduce depression symptom severity by 1 level  [] ** Freddy's psychosocial goals are as follows:  [x] Attend Healthy Mind-Set Workshops  [x] Reduce depression symptom severity by 1 level  [] ** Freddy's psychosocial goals are as follows:  [x] Attend Healthy Mind-Set Workshops  [x] Reduce depression symptom severity by 1 level  [] Sheryle Merle achieved psychosocial goals?   [x] Yes    [] No    [x] Use methods learned to continue to reduce depression symptom severity by 1 level  [] **     Individual Cardiac Treatment Plan - Other:  Risk Factor/Education  RISK FACTOR  ASSESSMENT/PLAN RISK FACTOR  REASSESSMENT  RISK FACTOR  REASSESSMENT RISK FACTOR  REASSESSMENT RISK FACTOR   DISCHARGE/FOLLOW-UP Stages of Change Stages of Change Stages of Change Stages of Change Stages of Change   [] Pre Contemplation  [] Contemplation  [x] Preparation  [] Action  [] Maintenance  [] Relapse [] Pre Contemplation  [] Contemplation  [x] Preparation  [] Action  [] Maintenance  [] Relapse [] Pre Contemplation  [] Contemplation  [] Preparation  [x] Action  [] Maintenance  [] Relapse [] Pre Contemplation  [] Contemplation  [] Preparation  [x] Action  [] Maintenance  [] Relapse [] Pre Contemplation  [] Contemplation  [] Preparation  [x] Action  [] Maintenance  [] Relapse   RISK FACTOR/EDUCATION ASSESSMENT RISK FACTOR/EDUCATION ASSESSMENT RISK FACTOR/EDUCATION ASSESSMENT RISK FACTOR/EDUCATION ASSESSMENT RISK FACTOR /EDUCATION ASSESSMENT   Hypertension  [x] Yes      [] No    Resting BP: 114/68  Peak Ex BP:142/78  Medication: losartan, metoprolol   Hypertension  Resting BP: 114/62  Peak Ex BP:154/62  Medication Changes:  [] Yes      [x] No Hypertension  Resting BP: 108/72  Peak Ex BP:156/62  Medication Changes:  [] Yes      [x] No Hypertension  Resting BP: 104/60  Peak Ex BP:124/60  Medication Changes:  [] Yes      [x] No Hypertension  Resting BP: 144/60  Peak Ex BP:164/68  Medication Changes:  [] Yes      [x] No   Lipids  HLD/DLD  [x] Yes      [] No  TOTAL CHOL: 141  HDL:  48  LDL:  81  TRI  Medication: atorvastatin Lipids  Medication Changes:  [] Yes      [x] No     Lipids  Medication Changes:  [] Yes      [x] No     Lipids  Medication Changes:  [] Yes      [x] No     Lipids      Medication Changes:  [] Yes      [x] No   Diabetes  [] Yes      [x] No  FBS: 104            Diabetes  na   Diabetes  na     Diabetes  na     Diabetes  na       Tobacco Use  [] Current  [x] Former  [] Never    Years smoked: 48:    Date Quit:     # cigarettes smoked/day: 1-2.5 pks/day    Smokeless Tobacco use:   [x] Yes      [] No  Quit  Tobacco Use  Change in smoking status   [] Yes      [x] No     Tobacco Use  Change in smoking status   [] Yes      [x] No       Tobacco Use  Change in smoking status   [] Yes      [x] No     Tobacco Use  Change in smoking status   [] Yes      [x] No                Learning Barriers  Please select one:  [] Speech  [] Literacy  [x] Hearing  [] Cognitive  [] Vision  [x] Ready to Learn Learning Barriers Addressed:   [x] Yes      [] No   Learning Barriers Addressed:   [x] Yes      [] No   Learning Barriers Addressed:  [x] Yes      [] No Learning Barriers Addressed:  [x] Yes      [] No     RISK FACTOR/EDUCATION PLAN RISK FACTOR/EDUCATION PLAN RISK FACTOR/EDUCATION PLAN RISK FACTOR/EDUCATION PLAN RISK FACTOR/EDUCATION PLAN   *Interventions* *Interventions* *Interventions* *Interventions* *Interventions*   Recommended Educational Videos    [x] Overview of The Pritikin Eating Plan  [x] Heart Disease Risk Reduction  [x] Move It! [x] Calorie Density  [x] Healthy Minds, Bodies, Hearts  [x] Label Reading  [x] Metabolic Syndrome & Belly   Or  [] How Our Thoughts Can Heal our Heart  [x] Dining Out-Part 1  [x] Biomechanical Limitations  [x] Facts on Fat  [x] Hypertension & Heart Disease  [x] Diseases of Our Times-Focusing on Diabetes  [x] Body Composition  [x] Nurtition Action Plan  [x] Exercise Action Plan   Completed Videos/Date      2/4/21  Overview of The Pritikin Eating Plan  2/24 Sleep   2/22 Exercise plan  2/12 Vit and Minerals   2/10 Biomechanical limits Completed Videos/Date  3/3 menu  3/5become pritikin   3/10 calorie density  3/19disease of time 1  3/24 heart disease risk   Completed Videos/Date  3/31 facts on fat  4/5 HTN  4/7 Move it   4/14 Biology Wt  4/19 Dining out 2  4/34 Metabolic syndrome Recommended Educational Videos Completed    [x] Yes      [] No  4/28 planning eating   4/30 body composition   5/5 biomechanical limits  5/12 aging enhance   5/17 nutrition action  5/26 Healthy Body mind                Smoking Cessation/Relaspe Prevention Intervention needed?   [] Yes      [x] No   Smoking Cessation/Relapse Prevention Scheduled? [] Yes      [x] No  Date:  ** Smoking Cessation/Relapse Prevention completed? [] Yes      [x] No  Date:  Smoking Cessation/Relapse Prevention completed? [] Yes      [x] No  Date: ** Smoking Cessation Counseling attended  [] Yes      [x] No  **If not completed, Why? **   Professional Referrals:  *Please check if needed  [] Diabetes Clinic  [] Lipid Clinic   [] Other:     Professional Referrals:  *Please check if needed  [] Diabetes Clinic  [] Lipid Clinic   [] Other:   Preventative Medication Preventative Medication Preventative Medication Preventative Medication Preventative Medication   Aspirin  [x] Yes    [] No  Blood Thinner: Clopidogrel/Effient/Brillinta  [x] Yes    [] No  Beta Blocker  [x] Yes    [] No  Ace Inhibitor  [] Yes    [x] No  Statin/Lipid Lowering  [x] Yes    [] No Medication Changes? [] Yes    [x] No Medication Changes? [] Yes    [x] No Medication Changes? [] Yes    [x] No Medication Changes? [] Yes    [x] No   *Education* *Education* *Education* *Education* *Education*   Does Derryl Bolds require any additional education? [] Yes    [x] No   Does Derryl Bolds require any additional education? [] Yes    [x] No Does Derryl Bolds require any additional education? [] Yes    [x] No Does Derryl Bolds require any additional education? [] Yes    [x] No Does Derryl Bolds require any additional education?   [] Yes    [x] No   Additional Educational Videos    Exercise  [] Improve Performance    Medical  [] Aging Enhancing Your QoL  [] Biology of Weight Control  [] Decoding Lab Results  [] Diseases of Our Time - Overview  [] Sleep Disorders    Nutrition  [] Dining Out - Part 2  [] Fueling a Healthy Body  [] Menu Workshop  [] Planning Your Eating Strategy  [] Targeting Your Nutrition Priorities  [] Vitamins & Minerals    Healthy Mind-Set  [] Smoking Cessation    Culinary  []Becoming a Pritikin    [] Cooking - Breakfast & Snacks  [] Cooking -[place] Salads & Dressing  [] Cooking -[address] [place] [address]  [] cardiac activity  [x] Continue continuous telemetry monitoring and notify me with any concerns  [] Other     Physician Response    [x] Cardiac rehab is reasonably and medically necessary for continuous cardiac monitoring surveillance  of patient's cardiac activity  [x] Continue continuous telemetry monitoring and notify me with any concerns   [] Other     Physician Response    [x] Cardiac rehab is reasonably and medically necessary for continuous cardiac monitoring surveillance  of patient's cardiac activity  [x] Continue continuous telemetry monitoring and notify me with any concerns   [] Other

## 2021-05-24 NOTE — TELEPHONE ENCOUNTER
[facility]   Patient of José/ Karla     History of palpitations     Battery okay     Episodes:false pauses  Bradycardia   5/18/21 pause -- Karla aware.  5/21/21 ?clifton 2     Called patient to clarify if he has had symptoms, is wearing his CPAP?  LMOM to call office.

## 2021-05-24 NOTE — PROGRESS NOTES
[facility] [facility]   Patient of José/ Karla    History of palpitations    Battery okay    Episodes:false pauses  Bradycardia   5/18/21 pause -- Karla aware.  5/21/21 ?clifton 2    Called patient to clarify if he has had symptoms, is wearing his CPAP? LMOM to call office. Pt returned call. Has not remembered feeling any symptoms throughout the nights. He has been wearing his CPAP most of the time. He cant remember the exact dates that he woke up without his CPAP on, but he put it back on once he realized it.      Made him aware of the arrhythmias. Will let us know if he notices any symptoms.

## 2021-05-26 ENCOUNTER — HOSPITAL ENCOUNTER (OUTPATIENT)
Dept: CARDIAC REHAB | Age: 66
Setting detail: THERAPIES SERIES
Discharge: HOME | End: 2021-05-26
Payer: COMMERCIAL

## 2021-05-26 ENCOUNTER — TELEPHONE (OUTPATIENT)
Dept: CARDIOLOGY CLINIC | Age: 66
End: 2021-05-26

## 2021-05-26 PROCEDURE — G0423 INTENS CARDIAC REHAB NO EXER: HCPCS

## 2021-05-26 PROCEDURE — G0422 INTENS CARDIAC REHAB W/EXERC: HCPCS

## 2021-06-01 ENCOUNTER — OFFICE VISIT (OUTPATIENT)
Dept: FAMILY MEDICINE CLINIC | Age: 66
End: 2021-06-01
Payer: COMMERCIAL

## 2021-06-01 VITALS
HEIGHT: 66 IN | HEART RATE: 76 BPM | DIASTOLIC BLOOD PRESSURE: 68 MMHG | BODY MASS INDEX: 29.57 KG/M2 | RESPIRATION RATE: 20 BRPM | SYSTOLIC BLOOD PRESSURE: 110 MMHG | OXYGEN SATURATION: 98 % | WEIGHT: 184 LBS

## 2021-06-01 DIAGNOSIS — K51.80 OTHER ULCERATIVE COLITIS WITHOUT COMPLICATION (HCC): ICD-10-CM

## 2021-06-01 DIAGNOSIS — J30.1 CHRONIC SEASONAL ALLERGIC RHINITIS DUE TO POLLEN: ICD-10-CM

## 2021-06-01 DIAGNOSIS — K21.9 GASTROESOPHAGEAL REFLUX DISEASE WITHOUT ESOPHAGITIS: ICD-10-CM

## 2021-06-01 DIAGNOSIS — R00.2 PALPITATION: ICD-10-CM

## 2021-06-01 DIAGNOSIS — I10 ESSENTIAL HYPERTENSION: Primary | ICD-10-CM

## 2021-06-01 DIAGNOSIS — I25.10 CORONARY ARTERY DISEASE INVOLVING NATIVE CORONARY ARTERY OF NATIVE HEART WITHOUT ANGINA PECTORIS: ICD-10-CM

## 2021-06-01 DIAGNOSIS — G44.019 EPISODIC CLUSTER HEADACHE, NOT INTRACTABLE: ICD-10-CM

## 2021-06-01 DIAGNOSIS — I20.9 ANGINA, CLASS III (HCC): ICD-10-CM

## 2021-06-01 DIAGNOSIS — M15.9 PRIMARY OSTEOARTHRITIS INVOLVING MULTIPLE JOINTS: ICD-10-CM

## 2021-06-01 DIAGNOSIS — E78.00 PURE HYPERCHOLESTEROLEMIA: ICD-10-CM

## 2021-06-01 PROCEDURE — 99396 PREV VISIT EST AGE 40-64: CPT | Performed by: FAMILY MEDICINE

## 2021-06-01 SDOH — ECONOMIC STABILITY: FOOD INSECURITY: WITHIN THE PAST 12 MONTHS, YOU WORRIED THAT YOUR FOOD WOULD RUN OUT BEFORE YOU GOT MONEY TO BUY MORE.: NEVER TRUE

## 2021-06-01 SDOH — ECONOMIC STABILITY: FOOD INSECURITY: WITHIN THE PAST 12 MONTHS, THE FOOD YOU BOUGHT JUST DIDN'T LAST AND YOU DIDN'T HAVE MONEY TO GET MORE.: NEVER TRUE

## 2021-06-01 ASSESSMENT — PATIENT HEALTH QUESTIONNAIRE - PHQ9
2. FEELING DOWN, DEPRESSED OR HOPELESS: 0
SUM OF ALL RESPONSES TO PHQ QUESTIONS 1-9: 0
SUM OF ALL RESPONSES TO PHQ QUESTIONS 1-9: 0
1. LITTLE INTEREST OR PLEASURE IN DOING THINGS: 0
SUM OF ALL RESPONSES TO PHQ9 QUESTIONS 1 & 2: 0
SUM OF ALL RESPONSES TO PHQ QUESTIONS 1-9: 0

## 2021-06-01 ASSESSMENT — SOCIAL DETERMINANTS OF HEALTH (SDOH): HOW HARD IS IT FOR YOU TO PAY FOR THE VERY BASICS LIKE FOOD, HOUSING, MEDICAL CARE, AND HEATING?: NOT HARD AT ALL

## 2021-06-01 NOTE — PROGRESS NOTES
[address] [address] [place]  Dept: [phone]  Dept Fax: [phone]  Loc: Reese Reyes 80 is a 60 y.o. male who presents today for:  Chief Complaint   Patient presents with    Annual Exam           HPI:     HPI    Well Adult Physical: Patient here for a comprehensive physical exam.The patient reports all chronic issues are well controlled on current medications. Do you take any herbs or supplements that were not prescribed by a doctor? no Are you taking calcium supplements? no Are you taking aspirin daily? no   History:  Any STD's in the past? none    Occasional tinnitus becoming more frequent and louder. He is willing to return for hearing test to reassess. Sometimes associated with sore throat and PND. Hypertension and hyperlipidemia are under good control but he has now also gone through heart cath and stent placement. He is feeling well and has lost weight and now exercising daily. Sinus pressure comes and goes all year. Now also having headaches in the evening. Left eye pressure the past month , tylenol and motrin helps. Occasional blood in the mucus from the nose. GERD controlled on PPI. Reviewed chart forpast medical history , surgical history , allergies, social history , family history and medications.     Health Maintenance   Topic Date Due    Lipid screen  05/20/2022    Potassium monitoring  05/20/2022    Creatinine monitoring  05/20/2022    DTaP/Tdap/Td vaccine (2 - Td or Tdap) 11/22/2026    Colon cancer screen colonoscopy  11/09/2030    Flu vaccine  Completed    Shingles Vaccine  Completed    COVID-19 Vaccine  Completed    Hepatitis A vaccine  Aged Out    Hepatitis B vaccine  Aged Out    Hib vaccine  Aged Out    Meningococcal (ACWY) vaccine  Aged Out    Pneumococcal 0-64 years Vaccine  Aged Out    Hepatitis C screen  Discontinued    HIV screen Discontinued       Subjective:      Constitutional:Negative for fever, chills, diaphoresis, activity change, appetite change and fatigue. HENT: Negative for hearing loss, ear pain, congestion, sore throat, rhinorrhea, postnasal drip and ear discharge. Eyes: Negative for photophobia and visual disturbance. Respiratory: Negative for cough, chest tightness, shortness of breath and wheezing. Cardiovascular: Negative for chest pain and leg swelling. Gastrointestinal: Negative for nausea, vomiting, abdominal pain, diarrhea and constipation. Genitourinary: Negative for dysuria, urgency and frequency. Neurological: Negative for weakness, light-headedness and headaches. Psychiatric/Behavioral: Negative for sleep disturbance.      :     Vitals:    06/01/21 0928   BP: 110/68   Site: Left Upper Arm   Position: Sitting   Cuff Size: Medium Adult   Pulse: 76   Resp: 20   SpO2: 98%   Weight: 184 lb (83.5 kg)   Height: 5' 6\" (1.676 m)     Wt Readings from Last 3 Encounters:   06/01/21 184 lb (83.5 kg)   05/11/21 190 lb 9.6 oz (86.5 kg)   03/15/21 192 lb (87.1 kg)       Physical Exam  Physical Exam   Constitutional: Vital signs are normal. He appears well-developed and well-nourished. He is active. HENT:   Head: Normocephalic and atraumatic. Right Ear: Tympanic membrane, external ear and ear canal normal. No drainage or tenderness. Left Ear: Tympanic membrane, external ear and ear canal normal. No drainage or tenderness. Nose: Nose normal. No mucosal edema or rhinorrhea. Mouth/Throat: Uvula is midline, oropharynx is clear and moist and mucous membranes are normal. Mucous membranes are not pale. Normal dentition. No posterior oropharyngeal edema or posterior oropharyngeal erythema. Eyes: Lids are normal. Right eye exhibits no chemosis and no discharge. Left eye exhibits no chemosis and no drainage. Right conjunctiva has no hemorrhage. Left conjunctiva has no hemorrhage.  Right eye exhibits normal extraocular motion. Left eye exhibits normal extraocular motion. Right pupil is round and reactive. Left pupil is round and reactive. Pupils are equal.   Cardiovascular: Normal rate, regular rhythm, S1 normal, S2 normal and normal heart sounds. Exam reveals no gallop. No murmur heard. Pulmonary/Chest: Effort normal and breath sounds normal. No respiratory distress. He has no wheezes. He has no rhonchi. He has no rales. Abdominal: Soft. Normal appearance and bowel sounds are normal. He exhibits no distension and no mass. There is no hepatosplenomegaly. No tenderness. He has no rigidity, no rebound and no guarding. No hernia. Musculoskeletal:        Right lower leg: He exhibits no edema. Left lower leg: He exhibits no edema. Neurological: He is alert. Oriented and pleasent        Assessment/Plan   Radha Rubalcava was seen today for annual exam.    Diagnoses and all orders for this visit:    Essential hypertension    Pure hypercholesterolemia    Chronic seasonal allergic rhinitis due to pollen    Episodic cluster headache, not intractable    Gastroesophageal reflux disease without esophagitis    Primary osteoarthritis involving multiple joints    Other ulcerative colitis without complication (HCC)    Angina, class III (HCC)    Palpitation    Coronary artery disease involving native coronary artery of native heart without angina pectoris      No change to medications   Continue healthy diet and exercise  Aspirin daily    Coamo foods  Small meals  No late meals    Discussed use, benefit, and side effectsof prescribed medications. All patient questions answered. Pt voiced understanding. Reviewed health maintenance. Instructed to continue current medications, diet and exercise. Patient agreed with treatment plan. Followup as directed.      Electronically signed by Ana Gomes MD

## 2021-06-09 ENCOUNTER — TELEPHONE (OUTPATIENT)
Dept: CARDIOLOGY CLINIC | Age: 66
End: 2021-06-09

## 2021-06-09 NOTE — TELEPHONE ENCOUNTER
[facility]  Patient of José/Karla    Pt continues having mobitz 2 arrhythmias throughout night. Dr Juju Owens aware. Continue to monitor for symptoms. Pt already aware to call if symptoms occur.

## 2021-06-25 ENCOUNTER — PROCEDURE VISIT (OUTPATIENT)
Dept: CARDIOLOGY CLINIC | Age: 66
End: 2021-06-25
Payer: COMMERCIAL

## 2021-06-25 DIAGNOSIS — R00.2 PALPITATION: Primary | ICD-10-CM

## 2021-06-25 PROCEDURE — 93298 REM INTERROG DEV EVAL SCRMS: CPT | Performed by: INTERNAL MEDICINE

## 2021-06-25 PROCEDURE — G2066 INTER DEVC REMOTE 30D: HCPCS | Performed by: INTERNAL MEDICINE

## 2021-06-25 NOTE — PROGRESS NOTES
DR SUH PT   REVEAL BATTERY OK  SINCE LAST CHECK ON 5/24/21 PT HAS HAD 12 NEW TAMRA EPISODES.  ALL OCCURRING IN THE MIDDLE OF THE NIGHT  PT DOES HAVE KNOWN HARMANBACH  HAD 2 NEW AFIB EPISODES/ LOOKS LIKE LONGES WAS 10 MINUTES    AFIB BURDEN LIFETIME IS <0.1%  AFIB BURDEN SINCE 5/22/21 WAS <0.1%  PT IS TAKING BABY ASA ONLY ALONG WITH BRIINTA FOR OTHER REASONS

## 2021-07-27 ENCOUNTER — PROCEDURE VISIT (OUTPATIENT)
Dept: CARDIOLOGY CLINIC | Age: 66
End: 2021-07-27
Payer: COMMERCIAL

## 2021-07-27 DIAGNOSIS — R00.2 PALPITATION: Primary | ICD-10-CM

## 2021-07-27 PROCEDURE — G2066 INTER DEVC REMOTE 30D: HCPCS | Performed by: INTERNAL MEDICINE

## 2021-07-27 PROCEDURE — 93298 REM INTERROG DEV EVAL SCRMS: CPT | Performed by: INTERNAL MEDICINE

## 2021-07-27 NOTE — PROGRESS NOTES
DR KEANE THIS IS DR SUH'S PT BUT HE IS OUT OF THE OFFICE ON VACATION      HE IS HAVING SOME SECOND DEGREE HEART BLOCK, WHICH WE HAVE BEEN MONITORING . ALL TAMRA EPISODES ARE OCCURRING IN THE MIDDLE OF THE NIGHT EXCEPT FOR 7/10/21 WAS AT 21:49 PM    CAN YOU PLEASE LOOK AT THESE TWO EPISODES THAT THE DEVICE IS CALLING THIS AFIB??NO DX OF AFIB/ BABY ASA AND BRILINTA ONLY  THEY MEASURE REGULAR BUT FAST AND DO NOT SEE ANY P WAVES.  DO YOU THINK THIS IS AFLUTTER

## 2021-08-03 ENCOUNTER — TELEPHONE (OUTPATIENT)
Dept: FAMILY MEDICINE CLINIC | Age: 66
End: 2021-08-03

## 2021-08-03 NOTE — TELEPHONE ENCOUNTER
----- Message from Mónica Leona sent at 8/3/2021  1:45 PM EDT -----  Subject: Appointment Request    Reason for Call: Urgent (Patient Request) No Script    QUESTIONS  Type of Appointment? Established Patient  Reason for appointment request? Available appointments did not meet   patient need  Additional Information for Provider? Pt would like to scheudle a follow up   appt to address a sore in his mouth. It seems to have gotten bigger and   has not healed although it has been present for a while. Pt does not mind   seeing other Physicians due to Dr Radha Ayon schedule being out so far. He   would like to see Aide Lora CNP if possible. please follow up when   able. Thanks!  ---------------------------------------------------------------------------  --------------  CALL BACK INFO  What is the best way for the office to contact you? OK to leave message on   voicemail  Preferred Call Back Phone Number? [phone]  ---------------------------------------------------------------------------  --------------  SCRIPT ANSWERS  Relationship to Patient? Self  (Is the patient requesting to see the provider for a procedure?)? No  (Is the patient requesting to see the provider urgently  today or   tomorrow. )? Yes  Have you been diagnosed with, awaiting test results for, or told that you   are suspected of having COVID-19 (Coronavirus)? (If patient has tested   negative or was tested as a requirement for work, school, or travel and   not based on symptoms, answer no)? No  Do you currently have flu-like symptoms including fever or chills, cough,   shortness of breath, difficulty breathing, or new loss of taste or smell? No  Have you had close contact with someone with COVID-19 in the last 14 days? No  (Service Expert  click yes below to proceed with [facility] As Usual   Scheduling)?  Yes

## 2021-08-04 ENCOUNTER — OFFICE VISIT (OUTPATIENT)
Dept: CARDIOLOGY CLINIC | Age: 66
End: 2021-08-04
Payer: COMMERCIAL

## 2021-08-04 VITALS
BODY MASS INDEX: 28.12 KG/M2 | WEIGHT: 175 LBS | HEIGHT: 66 IN | DIASTOLIC BLOOD PRESSURE: 62 MMHG | SYSTOLIC BLOOD PRESSURE: 118 MMHG | HEART RATE: 76 BPM

## 2021-08-04 DIAGNOSIS — I25.83 CORONARY ARTERY DISEASE DUE TO LIPID RICH PLAQUE: Primary | ICD-10-CM

## 2021-08-04 DIAGNOSIS — I25.10 CORONARY ARTERY DISEASE DUE TO LIPID RICH PLAQUE: Primary | ICD-10-CM

## 2021-08-04 PROCEDURE — 99214 OFFICE O/P EST MOD 30 MIN: CPT | Performed by: INTERNAL MEDICINE

## 2021-08-04 NOTE — PROGRESS NOTES
81 MG EC tablet, Take 81 mg by mouth daily. , Disp: , Rfl:     Past Medical History  Maria E Francisco  has a past medical history of Calcium oxalate renal stones, Coronary artery disease involving native coronary artery of native heart without angina pectoris, Essential hypertension, GERD (gastroesophageal reflux disease), Hyperlipidemia, Osteoarthritis, Palpitation, and Ulcerative colitis ([facility] [address] Social History  Maria E Francisco  reports that he quit smoking about 33 years ago. He has a 10.00 pack-year smoking history. He uses smokeless tobacco. He reports current alcohol use. He reports that he does not use drugs. Family History  Maria E Francisco family history includes Cancer in his mother and paternal grandfather; Diabetes in his maternal grandfather; Heart Disease in his maternal grandmother and paternal grandfather. Past Surgical History   Past Surgical History:   Procedure Laterality Date    CARDIAC CATHETERIZATION      COLONOSCOPY      4/2010=surveillance for ulcerative collitis    FINGER TRIGGER RELEASE      KNEE ARTHROSCOPY      ROTATOR CUFF REPAIR      TONSILLECTOMY         Subjective:     REVIEW OF SYSTEMS  Constitutional: denies sweats, chills and fever  HENT: denies  congestion, sinus pressure, sneezing and sore throat. Eyes: denies  pain, discharge, redness and itching. Respiratory: denies apnea, cough  Gastrointestinal: denies blood in stool, constipation, diarrhea   Endocrine: denies cold intolerance, heat intolerance, polydipsia. Genitourinary: denies dysuria, enuresis, flank pain and hematuria. Musculoskeletal: denies arthralgias, joint swelling and neck pain. Neurological: denies numbness and headaches. Psychiatric/Behavioral: denies agitation, confusion, decreased concentration and dysphoric mood    All others reviewed and are negative.    Objective:     /62   Pulse 76   Ht 5' 6\" (1.676 m)   Wt 175 lb (79.4 kg)   BMI 28.25 kg/m²     Wt Readings from Last 3 Encounters:   08/04/21 175 lb (79.4 kg)   06/01/21 184 lb (83.5 kg)   05/11/21 190 lb 9.6 oz (86.5 kg)     BP Readings from Last 3 Encounters:   08/04/21 118/62   06/01/21 110/68   05/11/21 116/70       PHYSICAL EXAM  Constitutional: Oriented to person, place, and time. Appears well-developed and well-nourished. HENT:   Head: Normocephalic and atraumatic. Eyes: EOM are normal. Pupils are equal, round, and reactive to light. Neck: Normal range of motion. Neck supple. No JVD present. Cardiovascular: Normal rate , normal heart sounds and intact distal pulses. Pulmonary/Chest: Effort normal and breath sounds normal. No respiratory distress. No wheezes. No rales. Abdominal: Soft. Bowel sounds are normal. No distension. There is no tenderness. Musculoskeletal: Normal range of motion. No edema. Neurological: Alert and oriented to person, place, and time. No cranial nerve deficit. Coordination normal.   Skin: Skin is warm and dry. Psychiatric: Normal mood and affect.        No results found for: CKTOTAL, CKMB, CKMBINDEX    Lab Results   Component Value Date    WBC 4.2 05/20/2021    RBC 4.30 05/20/2021    RBC 4.42 03/20/2012    HGB 13.3 05/20/2021    HCT 40.8 05/20/2021    MCV 94.9 05/20/2021    MCH 30.9 05/20/2021    MCHC 32.6 05/20/2021    RDW 12.3 03/29/2018     05/20/2021    MPV 9.1 05/20/2021       Lab Results   Component Value Date     05/20/2021    K 4.7 05/20/2021    K 3.9 03/15/2021     05/20/2021    CO2 28 05/20/2021    BUN 18 05/20/2021    LABALBU 4.4 05/20/2021    LABALBU 4.4 03/20/2012    CREATININE 0.8 05/20/2021    CALCIUM 9.8 05/20/2021    LABGLOM >90 05/20/2021    GLUCOSE 97 03/15/2021    GLUCOSE 97 03/20/2012       Lab Results   Component Value Date    ALKPHOS 63 05/20/2021    ALT 28 05/20/2021    AST 29 05/20/2021    PROT 6.9 05/20/2021    BILITOT 0.9 05/20/2021    LABALBU 4.4 05/20/2021    LABALBU 4.4 03/20/2012       No results found for: MG    Lab Results   Component Value Date    INR 0.97 03/15/2021    INR 0.93 12/22/2020         No results found for: LABA1C    Lab Results   Component Value Date    TRIG 46 05/20/2021    HDL 49 05/20/2021    LDLCALC 61 05/20/2021       Lab Results   Component Value Date    TSH 1.400 05/20/2021         Testing Reviewed:      I haveindividually reviewed the below cardiac tests    EKG:    ECHO: Results for orders placed during the hospital encounter of 12/22/20    ECHO Complete 2D W Doppler W Color    Narrative  Transthoracic Echocardiography Report (TTE)    Demographics    Patient Name    Rosalinda Chapa  Gender               Male  STEPH    MR #            [id number]       Race                     Ethnicity    Account #       [de-identified]       Room Number          0016    Accession       [id number]      Date of Study        12/22/2020  Number    Date of Birth   1955      Referring Physician  Goldie Lomax MD    Age             60 year(s)      Domenica Galloway, [facility]    Interpreting         Jeffery Khanna MD  Physician            Echo reader of the  week    Procedure    Type of Study    TTE procedure:ECHOCARDIOGRAM COMPLETE 2D W DOPPLER W COLOR. Procedure Date  Date: 12/22/2020 Start: 07:19 AM    Study Location: Bedside  Technical Quality: Adequate visualization    Indications:Chest pain. Additional Medical History:Ex Smoker, Hypertension, Hyperlipidemia. Patient Status: Routine    Height: 66 inches Weight: 202 pounds BSA: 2.01 m^2 BMI: 32.6 kg/m^2    BP: 129/74 mmHg    Allergies  - No Known Allergies. Conclusions    Summary  limited echo  Ejection fraction is visually estimated at 60%. Overall left ventricular function is normal.  No evidence of any pericardial effusion.     Signature    ----------------------------------------------------------------  Electronically signed by Jeffery Khanna MD (Interpreting  physician) on 12/27/2020 at 12:07 PM  ----------------------------------------------------------------    Findings    Mitral Valve  Structurally normal mitral valve. Aortic Valve  Aortic valve appears tricuspid. Tricuspid Valve  Tricuspid valve is structurally normal.    Pulmonic Valve  The pulmonic valve was not well visualized . Pulmonic valve is structurally normal.    Left Atrium  Normal size left atrium. Left Ventricle  Ejection fraction is visually estimated at 60%. Overall left ventricular function is normal.    Right Atrium  The right atrium is of normal size. Right Ventricle  Normal right ventricular size and function. Pericardial Effusion  No evidence of any pericardial effusion.     M-Mode/2D Measurements & Calculations    LV Diastolic    LV Systolic Dimension: 3  AV Cusp Separation: 2 cmLA  Dimension: 4.3  cm                        Dimension: 4 cmAO Root  cm              LV Volume Diastolic: 40.0 Dimension: 3.1 cmLA Area: 24.8  LV FS:30.2 %    ml                        cm^2  LV PW           LV Volume Systolic: 35 ml  Diastolic: 1.1  LV EDV/LV EDV Index: 83.1  cm              ml/41 m^2LV ESV/LV ESV  Septum          Index: 35 ml/17 m^2       RV Diastolic Dimension: 3 cm  Diastolic: 1.1  EF Calculated: 57.9 %  cm                                        LA/Aorta: 1.29  Ascending Aorta: 3.4 cm  LA volume/Index: 80.6 ml /40m^2    Doppler Measurements & Calculations    MV Peak E-Wave: 80.6 cm/s  AV Peak Velocity: 121  LVOT Peak Velocity: 87.4  MV Peak A-Wave: 41.6 cm/s  cm/s                   cm/s  MV E/A Ratio: 1.94         AV Peak Gradient: 5.86 LVOT Peak Gradient: 3  MV Peak Gradient: 2.6 mmHg mmHg                   mmHg    MV Deceleration Time: 264                         TV Peak E-Wave: 54.4  msec                                              cm/s  TV Peak A-Wave: 41.1  IVRT: 106 msec         cm/s  MV E' Septal Velocity: 7.8  cm/s                                              TV Peak Gradient: 1.18  MV A' Septal Velocity: 11 AV DVI (Vmax):0.72     mmHg  cm/s  MV E' Lateral Velocity:                           PV Peak Velocity: 70.7  9.4 cm/s                                          cm/s  MV A' Lateral Velocity:                           PV Peak Gradient: 2 mmHg  13.2 cm/s  E/E' septal: 10.33  E/E' lateral: 8.57    [email or web address] [id number]      STRESS:    CATH:    Assessment/Plan       Diagnosis Orders   1. Coronary artery disease due to lipid rich plaque         CAD s/p PCI on LAD 12/2020  UC  HTN  HLD  MELVIN on CPAP    Has some mobitz blocks at night time  Has had his CPAP pressures recently increased  Continue DAPT  On brilinta, no bleeding issues, no side effects  On statins  BP on lower side, Will d/c HCTZ  Completed cardiac rehab  The patient is asked to make an attempt to improve diet and exercise patterns to aid in medical management of this problem. Advised more plant based nutrition/meditarrean diet   Advised patient to call office or seek immediate medical attention if there is any new onset of  any chest pain, sob, palpitations, lightheadedness, dizziness, orthopnea, PND or pedal edema. All medication side effects were discussed in details. Thank youfor allowing me to participate in the care of this patient. Please do not hesitate to contact me for any further questions. Return in about 1 year (around 8/4/2022), or if symptoms worsen or fail to improve, for Regular follow up, Review testing.        Electronically signed by Thalia Perez MD [facility]  8/4/2021 at 7:46 AM EDT

## 2021-08-06 ENCOUNTER — TELEPHONE (OUTPATIENT)
Dept: FAMILY MEDICINE CLINIC | Age: 66
End: 2021-08-06

## 2021-08-06 ENCOUNTER — OFFICE VISIT (OUTPATIENT)
Dept: FAMILY MEDICINE CLINIC | Age: 66
End: 2021-08-06
Payer: COMMERCIAL

## 2021-08-06 VITALS
OXYGEN SATURATION: 98 % | WEIGHT: 182 LBS | DIASTOLIC BLOOD PRESSURE: 74 MMHG | TEMPERATURE: 97.9 F | HEART RATE: 67 BPM | BODY MASS INDEX: 29.25 KG/M2 | HEIGHT: 66 IN | RESPIRATION RATE: 16 BRPM | SYSTOLIC BLOOD PRESSURE: 110 MMHG

## 2021-08-06 DIAGNOSIS — K13.70 MOUTH LESION: Primary | ICD-10-CM

## 2021-08-06 PROCEDURE — 99213 OFFICE O/P EST LOW 20 MIN: CPT | Performed by: NURSE PRACTITIONER

## 2021-08-06 ASSESSMENT — ENCOUNTER SYMPTOMS
EYES NEGATIVE: 1
RESPIRATORY NEGATIVE: 1
GASTROINTESTINAL NEGATIVE: 1

## 2021-08-06 NOTE — TELEPHONE ENCOUNTER
----- Message from Edna Boles sent at 8/6/2021 10:38 AM EDT -----  Subject: Message to Provider    QUESTIONS  Information for Provider? Patient called , he went to the dentist and was   told the dentist does not handle bump on his lip, patient is requesting a   referral to an oral surgeon. And would like the referral as soon as   possible. Please contact patient to advise.  ---------------------------------------------------------------------------  --------------  CALL BACK INFO  What is the best way for the office to contact you? OK to leave message on   voicemail  Preferred Call Back Phone Number? [phone]  ---------------------------------------------------------------------------  --------------  SCRIPT ANSWERS  Relationship to Patient?  Self

## 2021-08-06 NOTE — PROGRESS NOTES
Gaurav Key is a 60 y.o. male whopresents today for :  Chief Complaint   Patient presents with    Other     sore on lip       HPI:     HPI  Pt has a growth on his inside of his lip.   States has been present for years but recently enlarged     Patient Active Problem List   Diagnosis    Episodic cluster headache, not intractable    Essential hypertension    Hyperlipidemia    GERD (gastroesophageal reflux disease)    Osteoarthritis    Ulcerative colitis ([address]    Abnormal cardiovascular stress test    Angina, class III ([address]    Coronary artery disease involving native coronary artery of native heart without angina pectoris    Palpitation        Past Medical History:   Diagnosis Date    Calcium oxalate renal stones     he has undergone ECSW lithotripsy    Coronary artery disease involving native coronary artery of native heart without angina pectoris     Essential hypertension 2016    GERD (gastroesophageal reflux disease)     Hyperlipidemia 2003    hypercholsterolemia    Osteoarthritis     Palpitation 2021    Ulcerative colitis ([address]       Past Surgical History:   Procedure Laterality Date    CARDIAC CATHETERIZATION      COLONOSCOPY      2010=surveillance for ulcerative collitis    FINGER TRIGGER RELEASE      KNEE ARTHROSCOPY      ROTATOR CUFF REPAIR      TONSILLECTOMY       Family History   Problem Relation Age of Onset    Cancer Mother         NHL    Heart Disease Maternal Grandmother         CAD    Diabetes Maternal Grandfather     Heart Disease Paternal Grandfather         [address] Cancer Paternal Grandfather         PROSTATE     Social History     Tobacco Use    Smoking status: Former Smoker     Packs/day: 1.00     Years: 10.00     Pack years: 10.00     Quit date: 3/29/1988     Years since quittin.3    Smokeless tobacco: Current User   Substance Use Topics    Alcohol use: Yes     Comment: 1-2 daily      Current Outpatient Medications   Medication Sig Skin: Negative. Neurological: Negative. Objective:     Vitals:    08/06/21 0943   BP: 110/74   Site: Left Upper Arm   Position: Sitting   Cuff Size: Medium Adult   Pulse: 67   Resp: 16   Temp: 97.9 °F (36.6 °C)   TempSrc: Temporal   SpO2: 98%   Weight: 182 lb (82.6 kg)   Height: 5' 5.98\" (1.676 m)       Physical Exam  Constitutional:       Appearance: He is well-developed. HENT:      Head: Normocephalic. Right Ear: Tympanic membrane and external ear normal.      Left Ear: Tympanic membrane and external ear normal.      Nose: Nose normal.      Mouth/Throat:     Cardiovascular:      Rate and Rhythm: Normal rate and regular rhythm. Heart sounds: Normal heart sounds. No murmur heard. No friction rub. No gallop. Pulmonary:      Effort: Pulmonary effort is normal.      Breath sounds: Normal breath sounds. No wheezing or rales. Abdominal:      General: Bowel sounds are normal.      Palpations: Abdomen is soft. Tenderness: There is no abdominal tenderness. There is no guarding. Musculoskeletal:         General: Normal range of motion. Cervical back: Normal range of motion and neck supple. Lymphadenopathy:      Cervical: No cervical adenopathy. Skin:     General: Skin is warm. Neurological:      Mental Status: He is alert and oriented to person, place, and time. Deep Tendon Reflexes: Reflexes are normal and symmetric. Assessment:      Diagnosis Orders   1. Mouth lesion         Plan:      No follow-ups on file. No orders of the defined types were placed in this encounter. No orders of the defined types were placed in this encounter. Advised to call dentist.  Suspect benign but since it is in area he is biting best to be removed   Patient given educational materials - seepatient instructions. Discussed use, benefit, and side effects of prescribed medications. All patient questions answered. Pt voiced understanding. Patient agreed withtreatment plan.

## 2021-08-10 ENCOUNTER — TELEPHONE (OUTPATIENT)
Dept: FAMILY MEDICINE CLINIC | Age: 66
End: 2021-08-10

## 2021-08-10 DIAGNOSIS — K13.70 ORAL LESION: Primary | ICD-10-CM

## 2021-08-10 NOTE — TELEPHONE ENCOUNTER
Faxed referral and notes to Dr Edel Jones [phone]    Patient aware and voiced understanding, no concerns voiced at this time.   He states he has appt already scheduled 9/16 @ 116

## 2021-08-10 NOTE — TELEPHONE ENCOUNTER
----- Message from Tosha Campbell sent at 8/10/2021  1:42 PM EDT -----  Subject: Referral Request    QUESTIONS   Reason for referral request? Patient needs oral surgery. Remove growth on   inside of lip and biopsy request per Tatiana Abel.   Has the physician seen you for this condition before? No   Preferred Specialist (if applicable)? Do you already have an appointment scheduled? Yes  Select Scheduled Date? 2021-09-16  Select Scheduled Physician? Outside Physician - Tosha Campbell  Additional Information for Provider?   ---------------------------------------------------------------------------  --------------  [address]  What is the best way for the office to contact you? OK to leave message on   voicemail  Preferred Call Back Phone Number?  [phone]

## 2021-08-11 ENCOUNTER — OFFICE VISIT (OUTPATIENT)
Dept: PULMONOLOGY | Age: 66
End: 2021-08-11
Payer: COMMERCIAL

## 2021-08-11 VITALS
DIASTOLIC BLOOD PRESSURE: 70 MMHG | SYSTOLIC BLOOD PRESSURE: 118 MMHG | HEART RATE: 72 BPM | OXYGEN SATURATION: 97 % | BODY MASS INDEX: 29.73 KG/M2 | WEIGHT: 185 LBS | TEMPERATURE: 98.1 F | HEIGHT: 66 IN

## 2021-08-11 DIAGNOSIS — G47.33 OSA ON CPAP: Primary | ICD-10-CM

## 2021-08-11 DIAGNOSIS — Z99.89 OSA ON CPAP: Primary | ICD-10-CM

## 2021-08-11 PROCEDURE — 99214 OFFICE O/P EST MOD 30 MIN: CPT | Performed by: NURSE PRACTITIONER

## 2021-08-11 ASSESSMENT — ENCOUNTER SYMPTOMS
WHEEZING: 0
ALLERGIC/IMMUNOLOGIC NEGATIVE: 1
GASTROINTESTINAL NEGATIVE: 1
DIARRHEA: 0
VOMITING: 0
RESPIRATORY NEGATIVE: 1
CHEST TIGHTNESS: 0
EYES NEGATIVE: 1
STRIDOR: 0
NAUSEA: 0

## 2021-08-11 NOTE — PROGRESS NOTES
[facility]      Shae Patiño         [id number]  8/11/2021   Chief Complaint   Patient presents with    Follow-up     3 mo fu with download        Pt of Dr. Abby Beatty    PAP Download:   Paige Sea or initial AHI: 36.4     Date of initial study: 1/16/21      Compliant  93%     Noncompliant 0 %     PAP Type Air Sense 10Level  10 cm H2O   Avg Hrs/Day 6 hours and 8 min  AHI: 6.1   Recorded compliance dates , 07/11/21  to 08/09/21   Machine/Mfg:   [x] ResMed    [] Respironics/Dreamstation   Interface:   [] Nasal    [] Nasal pillows   [x] FFM      Provider:      [x] [facility]     []Rita     [] [employer]    [] Radha Noland    [] [place]               [] [facility]      [] Adaptive    [] [address]      [] Other    Neck Size: 18  Mallampati Mallampati 2  ESS: 9  SAQLI: 89    Here is a scan of the most recent download:                        Presentation:   Moises Chong presents for sleep medicine follow up for obstructive sleep apnea  Since the last visit, Moises Chong has been compliant but AHI is NOT under control currently on APAP 6-10 cm H2O median pressure is 9 and max out at 10 cm H2O. Increase in centrals as well will decrease pressure to 10 cm H2O  Too much air patient belching and more gas lately     Equipment issues: The pressure is not  acceptable, the mask is acceptable     Sleep issues:  Do you feel better? Yes  More rested? Sometimes   Better concentration? yes    Progress History:   Since last visit any new medical issues? No  New ER or hospital visits? No  Any new or changes in medicines? No  Any new sleep medicines? No    Review of Systems -   Review of Systems   Constitutional: Negative. Negative for chills, fever and unexpected weight change. HENT: Negative. Eyes: Negative. Respiratory: Negative. Negative for chest tightness, wheezing and stridor. Cardiovascular: Negative for chest pain and leg swelling. Gastrointestinal: Negative.   Negative for diarrhea, nausea and vomiting. Endocrine: Negative. Genitourinary: Negative. Negative for dysuria. Musculoskeletal: Negative. Skin: Negative. Allergic/Immunologic: Negative. Neurological: Negative. Hematological: Negative. Psychiatric/Behavioral: Negative. Physical Exam:    BMI:  Body mass index is 29.86 kg/m². Wt Readings from Last 3 Encounters:   08/11/21 185 lb (83.9 kg)   08/06/21 182 lb (82.6 kg)   08/04/21 175 lb (79.4 kg)     Weight stable / unchanged  Vitals: /70 (Site: Left Upper Arm, Position: Sitting, Cuff Size: Large Adult)   Pulse 72   Temp 98.1 °F (36.7 °C)   Ht 5' 6\" (1.676 m)   Wt 185 lb (83.9 kg)   SpO2 97% Comment: on r/a  BMI 29.86 kg/m²       Physical Exam  Vitals and nursing note reviewed. Constitutional:       General: He is not in acute distress. Appearance: He is well-developed. HENT:      Head: Normocephalic and atraumatic. Neck:      Trachea: No tracheal deviation. Cardiovascular:      Rate and Rhythm: Normal rate and regular rhythm. Heart sounds: Normal heart sounds. No murmur heard. Pulmonary:      Effort: Pulmonary effort is normal. No respiratory distress. Breath sounds: Normal breath sounds. No stridor. No wheezing or rales. Chest:      Chest wall: No tenderness. Abdominal:      General: Bowel sounds are normal. There is no distension. Palpations: Abdomen is soft. Skin:     General: Skin is warm and dry. Capillary Refill: Capillary refill takes less than 2 seconds. Neurological:      Mental Status: He is alert and oriented to person, place, and time. Psychiatric:         Behavior: Behavior normal.         Thought Content: Thought content normal.         Judgment: Judgment normal.           ASSESSMENT/DIAGNOSIS     Diagnosis Orders   1. MELVIN on CPAP  CPAP Machine MISC            Plan   Do you need any equipment today?  No    - Download reviewed and discussed with patient  - Will change APAP to CPAP with straight pressure of 9 cm H2O and increase EPR to 3  With DL in 2 weeks   - He  advised to keep good compliance with current recommended pressure to get optimal results and clinical improvement  - Recommend 7-9 hours of sleep with PAP  - He was advised to call [employer] company regarding supplies if needed.   -He call my office for earlier appointment if needed for worsening of sleep symptoms.   - He was instructed on weight loss  - Yovani Jones was educated about my impression and plan. Patient verbalizesunderstanding.   We will see Kody Daniel back in: 3 months with download    Information added by my medical assistant/LPN was reviewed today    Electronically signed by Cl Martin, SAI - CNP on 8/11/2021 at 9:31 AM

## 2021-08-25 ENCOUNTER — PROCEDURE VISIT (OUTPATIENT)
Dept: CARDIOLOGY CLINIC | Age: 66
End: 2021-08-25

## 2021-08-25 ENCOUNTER — TELEPHONE (OUTPATIENT)
Dept: CARDIOLOGY CLINIC | Age: 66
End: 2021-08-25

## 2021-08-25 DIAGNOSIS — R00.2 PALPITATION: Primary | ICD-10-CM

## 2021-08-25 NOTE — PROGRESS NOTES
carelink medtronic linq     Hx palpitations   dontae     Wenkebach    ? Possible atrial flutter?  No OAC

## 2021-08-25 NOTE — TELEPHONE ENCOUNTER
Medtronic Loop recorder disconnected. Called patient to send download. Walked patient through download.

## 2021-09-16 ENCOUNTER — TELEPHONE (OUTPATIENT)
Dept: CARDIOLOGY CLINIC | Age: 66
End: 2021-09-16

## 2021-09-16 NOTE — TELEPHONE ENCOUNTER
Called patient asked to send download. Received alert this morning that he had dontae and pause episodes. Only one dontae episodes came up while he was sleeping. Manual download received. Dontae episodes at night. Asymptomatic. ?? more runs of atrial flutter. Longest episodes noted for 34 minutes.

## 2021-09-27 ENCOUNTER — PROCEDURE VISIT (OUTPATIENT)
Dept: CARDIOLOGY CLINIC | Age: 66
End: 2021-09-27
Payer: COMMERCIAL

## 2021-09-27 DIAGNOSIS — R00.2 PALPITATION: Primary | ICD-10-CM

## 2021-09-27 NOTE — PROGRESS NOTES
MEDTRONIC CARELINK LINQ REMOTE  BATTERY OK  EPISOES OF TAMRA IN THE MIDDLE OF THE NIGHT   DO YOU THINK THIS COULD BE AFLUTTER??  HAS A HX OF AFIB / ONLY ON BRILINTA FOR STENT   AFIB BURDEN <0.1%  POSSIBLE [facility]

## 2021-09-28 PROCEDURE — 93298 REM INTERROG DEV EVAL SCRMS: CPT | Performed by: INTERNAL MEDICINE

## 2021-09-28 PROCEDURE — G2066 INTER DEVC REMOTE 30D: HCPCS | Performed by: INTERNAL MEDICINE

## 2021-10-04 ENCOUNTER — PROCEDURE VISIT (OUTPATIENT)
Dept: CARDIOLOGY CLINIC | Age: 66
End: 2021-10-04

## 2021-10-04 DIAGNOSIS — I48.91 ATRIAL FIBRILLATION, UNSPECIFIED TYPE (HCC): Primary | ICD-10-CM

## 2021-11-02 ENCOUNTER — PROCEDURE VISIT (OUTPATIENT)
Dept: CARDIOLOGY CLINIC | Age: 66
End: 2021-11-02
Payer: COMMERCIAL

## 2021-11-02 DIAGNOSIS — R00.2 PALPITATION: Primary | ICD-10-CM

## 2021-11-05 ENCOUNTER — TELEPHONE (OUTPATIENT)
Dept: FAMILY MEDICINE CLINIC | Age: 66
End: 2021-11-05

## 2021-11-05 DIAGNOSIS — K21.9 GASTROESOPHAGEAL REFLUX DISEASE WITHOUT ESOPHAGITIS: ICD-10-CM

## 2021-11-05 DIAGNOSIS — I25.10 CORONARY ARTERY DISEASE INVOLVING NATIVE CORONARY ARTERY OF NATIVE HEART WITHOUT ANGINA PECTORIS: ICD-10-CM

## 2021-11-05 DIAGNOSIS — I10 ESSENTIAL HYPERTENSION: ICD-10-CM

## 2021-11-05 DIAGNOSIS — E78.00 PURE HYPERCHOLESTEROLEMIA: ICD-10-CM

## 2021-11-05 DIAGNOSIS — I10 ESSENTIAL HYPERTENSION: Primary | ICD-10-CM

## 2021-11-05 PROCEDURE — G2066 INTER DEVC REMOTE 30D: HCPCS | Performed by: INTERNAL MEDICINE

## 2021-11-05 PROCEDURE — 93298 REM INTERROG DEV EVAL SCRMS: CPT | Performed by: INTERNAL MEDICINE

## 2021-11-05 RX ORDER — ATORVASTATIN CALCIUM 40 MG/1
40 TABLET, FILM COATED ORAL DAILY
Qty: 90 TABLET | Refills: 3 | Status: SHIPPED | OUTPATIENT
Start: 2021-11-05 | End: 2022-01-11 | Stop reason: SDUPTHER

## 2021-11-05 RX ORDER — LOSARTAN POTASSIUM 50 MG/1
TABLET ORAL
Qty: 90 TABLET | Refills: 3 | Status: SHIPPED | OUTPATIENT
Start: 2021-11-05 | End: 2022-01-11 | Stop reason: SDUPTHER

## 2021-11-08 NOTE — TELEPHONE ENCOUNTER
Patient states he will be on medicare in January and wants to do his annual exam and the labs all at once.

## 2021-11-22 DIAGNOSIS — J30.1 CHRONIC SEASONAL ALLERGIC RHINITIS DUE TO POLLEN: ICD-10-CM

## 2021-11-23 RX ORDER — LEVOCETIRIZINE DIHYDROCHLORIDE 5 MG/1
TABLET, FILM COATED ORAL
Qty: 90 TABLET | Refills: 1 | Status: SHIPPED | OUTPATIENT
Start: 2021-11-23 | End: 2022-02-21 | Stop reason: ALTCHOICE

## 2021-12-07 ENCOUNTER — TELEPHONE (OUTPATIENT)
Dept: CARDIOLOGY CLINIC | Age: 66
End: 2021-12-07

## 2021-12-07 ENCOUNTER — PROCEDURE VISIT (OUTPATIENT)
Dept: CARDIOLOGY CLINIC | Age: 66
End: 2021-12-07
Payer: MEDICARE

## 2021-12-07 DIAGNOSIS — R00.2 PALPITATION: Primary | ICD-10-CM

## 2021-12-07 PROCEDURE — 93298 REM INTERROG DEV EVAL SCRMS: CPT | Performed by: INTERNAL MEDICINE

## 2021-12-07 PROCEDURE — G2066 INTER DEVC REMOTE 30D: HCPCS | Performed by: INTERNAL MEDICINE

## 2021-12-07 NOTE — LETTER
[address] [place] [address]  Phone: [phone]  Fax: [phone] December 7, 2021    [address] [place] [address]      Dear Delaney Mari:    I received your loop recorder home download on 12/7/21. Your next scheduled, automatic, download will be on 1/11/22. Your loop recorder was implanted for palpitations. Our findings:     No new issues --- KNOWN heart block at night. Please call office if any symptoms start. Our office will ALWAYS contact you BY PHONE if we determined you have a new, abnormal heart rhythm. Please update contact information if it changes! If you need help with your monitor, please contact [employer] at [phone]. Thank You!   Fabio Roberts

## 2021-12-07 NOTE — PROGRESS NOTES
[facility] Medtronic Linq   Patient of Nallu/ Hakim    History of palpitations    Battery okay    Episodes:  Known Mobitz 1 at night  known Mobitz 2 at night  ? False afib

## 2021-12-12 ENCOUNTER — TELEPHONE (OUTPATIENT)
Dept: FAMILY MEDICINE CLINIC | Age: 66
End: 2021-12-12

## 2021-12-20 ENCOUNTER — TELEPHONE (OUTPATIENT)
Dept: CARDIOLOGY CLINIC | Age: 66
End: 2021-12-20

## 2021-12-20 NOTE — TELEPHONE ENCOUNTER
Patient called stating on 8/4/2021 he was advised by Dr. Zachary Sims to stop Brilinta on 12/20/2021. Please agree this is what patient needs to do.

## 2022-01-05 ENCOUNTER — NURSE ONLY (OUTPATIENT)
Dept: LAB | Age: 67
End: 2022-01-05

## 2022-01-05 DIAGNOSIS — K21.9 GASTROESOPHAGEAL REFLUX DISEASE WITHOUT ESOPHAGITIS: ICD-10-CM

## 2022-01-05 DIAGNOSIS — I10 ESSENTIAL HYPERTENSION: ICD-10-CM

## 2022-01-05 DIAGNOSIS — I25.10 CORONARY ARTERY DISEASE INVOLVING NATIVE CORONARY ARTERY OF NATIVE HEART WITHOUT ANGINA PECTORIS: ICD-10-CM

## 2022-01-05 DIAGNOSIS — E78.00 PURE HYPERCHOLESTEROLEMIA: ICD-10-CM

## 2022-01-05 LAB
ALBUMIN SERPL-MCNC: 4.5 G/DL (ref 3.5–5.1)
ALP BLD-CCNC: 85 U/L (ref 38–126)
ALT SERPL-CCNC: 23 U/L (ref 11–66)
ANION GAP SERPL CALCULATED.3IONS-SCNC: 13 MEQ/L (ref 8–16)
AST SERPL-CCNC: 23 U/L (ref 5–40)
BILIRUB SERPL-MCNC: 0.8 MG/DL (ref 0.3–1.2)
BUN BLDV-MCNC: 18 MG/DL (ref 7–22)
CALCIUM SERPL-MCNC: 9.7 MG/DL (ref 8.5–10.5)
CHLORIDE BLD-SCNC: 106 MEQ/L (ref 98–111)
CHOLESTEROL, TOTAL: 127 MG/DL (ref 100–199)
CO2: 28 MEQ/L (ref 23–33)
CREAT SERPL-MCNC: 0.8 MG/DL (ref 0.4–1.2)
ERYTHROCYTE [DISTWIDTH] IN BLOOD BY AUTOMATED COUNT: 11.8 % (ref 11.5–14.5)
ERYTHROCYTE [DISTWIDTH] IN BLOOD BY AUTOMATED COUNT: 40.7 FL (ref 35–45)
GFR SERPL CREATININE-BSD FRML MDRD: > 90 ML/MIN/1.73M2
GLUCOSE FASTING: 91 MG/DL (ref 70–108)
HCT VFR BLD CALC: 42.1 % (ref 42–52)
HDLC SERPL-MCNC: 57 MG/DL
HEMOGLOBIN: 13.9 GM/DL (ref 14–18)
LDL CHOLESTEROL CALCULATED: 61 MG/DL
MCH RBC QN AUTO: 31.2 PG (ref 26–33)
MCHC RBC AUTO-ENTMCNC: 33 GM/DL (ref 32.2–35.5)
MCV RBC AUTO: 94.6 FL (ref 80–94)
PLATELET # BLD: 192 THOU/MM3 (ref 130–400)
PMV BLD AUTO: 9.1 FL (ref 9.4–12.4)
POTASSIUM SERPL-SCNC: 4.5 MEQ/L (ref 3.5–5.2)
RBC # BLD: 4.45 MILL/MM3 (ref 4.7–6.1)
SODIUM BLD-SCNC: 147 MEQ/L (ref 135–145)
TOTAL PROTEIN: 6.6 G/DL (ref 6.1–8)
TRIGL SERPL-MCNC: 47 MG/DL (ref 0–199)
TSH SERPL DL<=0.05 MIU/L-ACNC: 1.43 UIU/ML (ref 0.4–4.2)
WBC # BLD: 5 THOU/MM3 (ref 4.8–10.8)

## 2022-01-11 ENCOUNTER — PROCEDURE VISIT (OUTPATIENT)
Dept: CARDIOLOGY CLINIC | Age: 67
End: 2022-01-11
Payer: MEDICARE

## 2022-01-11 ENCOUNTER — TELEPHONE (OUTPATIENT)
Dept: CARDIOLOGY CLINIC | Age: 67
End: 2022-01-11

## 2022-01-11 ENCOUNTER — NURSE ONLY (OUTPATIENT)
Dept: LAB | Age: 67
End: 2022-01-11

## 2022-01-11 ENCOUNTER — OFFICE VISIT (OUTPATIENT)
Dept: FAMILY MEDICINE CLINIC | Age: 67
End: 2022-01-11
Payer: MEDICARE

## 2022-01-11 VITALS
BODY MASS INDEX: 29.73 KG/M2 | WEIGHT: 185 LBS | HEIGHT: 66 IN | TEMPERATURE: 98 F | OXYGEN SATURATION: 100 % | SYSTOLIC BLOOD PRESSURE: 110 MMHG | DIASTOLIC BLOOD PRESSURE: 64 MMHG | HEART RATE: 54 BPM | RESPIRATION RATE: 16 BRPM

## 2022-01-11 DIAGNOSIS — K21.9 GASTROESOPHAGEAL REFLUX DISEASE WITHOUT ESOPHAGITIS: ICD-10-CM

## 2022-01-11 DIAGNOSIS — I10 ESSENTIAL HYPERTENSION: ICD-10-CM

## 2022-01-11 DIAGNOSIS — E87.0 HYPERNATREMIA: ICD-10-CM

## 2022-01-11 DIAGNOSIS — N52.9 ERECTILE DYSFUNCTION, UNSPECIFIED ERECTILE DYSFUNCTION TYPE: ICD-10-CM

## 2022-01-11 DIAGNOSIS — R35.0 URINARY FREQUENCY: ICD-10-CM

## 2022-01-11 DIAGNOSIS — B35.1 ONYCHOMYCOSIS: ICD-10-CM

## 2022-01-11 DIAGNOSIS — M15.9 PRIMARY OSTEOARTHRITIS INVOLVING MULTIPLE JOINTS: ICD-10-CM

## 2022-01-11 DIAGNOSIS — Z00.00 ROUTINE GENERAL MEDICAL EXAMINATION AT A HEALTH CARE FACILITY: Primary | ICD-10-CM

## 2022-01-11 DIAGNOSIS — I25.10 CORONARY ARTERY DISEASE INVOLVING NATIVE CORONARY ARTERY OF NATIVE HEART WITHOUT ANGINA PECTORIS: ICD-10-CM

## 2022-01-11 DIAGNOSIS — G44.019 EPISODIC CLUSTER HEADACHE, NOT INTRACTABLE: ICD-10-CM

## 2022-01-11 DIAGNOSIS — J30.1 CHRONIC SEASONAL ALLERGIC RHINITIS DUE TO POLLEN: ICD-10-CM

## 2022-01-11 DIAGNOSIS — E78.00 PURE HYPERCHOLESTEROLEMIA: ICD-10-CM

## 2022-01-11 DIAGNOSIS — R00.2 PALPITATION: Primary | ICD-10-CM

## 2022-01-11 DIAGNOSIS — I20.9 ANGINA, CLASS III (HCC): ICD-10-CM

## 2022-01-11 DIAGNOSIS — K51.80 OTHER ULCERATIVE COLITIS WITHOUT COMPLICATION (HCC): ICD-10-CM

## 2022-01-11 LAB
PROSTATE SPECIFIC ANTIGEN: 0.49 NG/ML (ref 0–1)
SODIUM BLD-SCNC: 141 MEQ/L (ref 135–145)

## 2022-01-11 PROCEDURE — 3017F COLORECTAL CA SCREEN DOC REV: CPT | Performed by: FAMILY MEDICINE

## 2022-01-11 PROCEDURE — 99213 OFFICE O/P EST LOW 20 MIN: CPT | Performed by: FAMILY MEDICINE

## 2022-01-11 PROCEDURE — 4040F PNEUMOC VAC/ADMIN/RCVD: CPT | Performed by: FAMILY MEDICINE

## 2022-01-11 PROCEDURE — G8427 DOCREV CUR MEDS BY ELIG CLIN: HCPCS | Performed by: FAMILY MEDICINE

## 2022-01-11 PROCEDURE — G8482 FLU IMMUNIZE ORDER/ADMIN: HCPCS | Performed by: FAMILY MEDICINE

## 2022-01-11 PROCEDURE — 4004F PT TOBACCO SCREEN RCVD TLK: CPT | Performed by: FAMILY MEDICINE

## 2022-01-11 PROCEDURE — G0009 ADMIN PNEUMOCOCCAL VACCINE: HCPCS | Performed by: FAMILY MEDICINE

## 2022-01-11 PROCEDURE — 90732 PPSV23 VACC 2 YRS+ SUBQ/IM: CPT | Performed by: FAMILY MEDICINE

## 2022-01-11 PROCEDURE — G8417 CALC BMI ABV UP PARAM F/U: HCPCS | Performed by: FAMILY MEDICINE

## 2022-01-11 PROCEDURE — 1123F ACP DISCUSS/DSCN MKR DOCD: CPT | Performed by: FAMILY MEDICINE

## 2022-01-11 PROCEDURE — G0402 INITIAL PREVENTIVE EXAM: HCPCS | Performed by: FAMILY MEDICINE

## 2022-01-11 RX ORDER — ATORVASTATIN CALCIUM 40 MG/1
40 TABLET, FILM COATED ORAL DAILY
Qty: 90 TABLET | Refills: 3 | Status: SHIPPED | OUTPATIENT
Start: 2022-01-11

## 2022-01-11 RX ORDER — LOSARTAN POTASSIUM 50 MG/1
TABLET ORAL
Qty: 90 TABLET | Refills: 3 | Status: SHIPPED | OUTPATIENT
Start: 2022-01-11

## 2022-01-11 RX ORDER — TERBINAFINE HYDROCHLORIDE 250 MG/1
250 TABLET ORAL DAILY
Qty: 90 TABLET | Refills: 1 | Status: SHIPPED | OUTPATIENT
Start: 2022-01-11 | End: 2022-07-11 | Stop reason: SDUPTHER

## 2022-01-11 ASSESSMENT — LIFESTYLE VARIABLES
HAS A RELATIVE, FRIEND, DOCTOR, OR ANOTHER HEALTH PROFESSIONAL EXPRESSED CONCERN ABOUT YOUR DRINKING OR SUGGESTED YOU CUT DOWN: 0
HOW OFTEN DURING THE LAST YEAR HAVE YOU HAD A FEELING OF GUILT OR REMORSE AFTER DRINKING: 0
HOW MANY STANDARD DRINKS CONTAINING ALCOHOL DO YOU HAVE ON A TYPICAL DAY: 0
HOW OFTEN DURING THE LAST YEAR HAVE YOU FOUND THAT YOU WERE NOT ABLE TO STOP DRINKING ONCE YOU HAD STARTED: 0
HOW OFTEN DURING THE LAST YEAR HAVE YOU BEEN UNABLE TO REMEMBER WHAT HAPPENED THE NIGHT BEFORE BECAUSE YOU HAD BEEN DRINKING: 0
HAVE YOU OR SOMEONE ELSE BEEN INJURED AS A RESULT OF YOUR DRINKING: 0
AUDIT-C TOTAL SCORE: 3
HOW OFTEN DO YOU HAVE A DRINK CONTAINING ALCOHOL: 3
HOW OFTEN DO YOU HAVE SIX OR MORE DRINKS ON ONE OCCASION: 0
AUDIT TOTAL SCORE: 3
HOW OFTEN DURING THE LAST YEAR HAVE YOU NEEDED AN ALCOHOLIC DRINK FIRST THING IN THE MORNING TO GET YOURSELF GOING AFTER A NIGHT OF HEAVY DRINKING: 0
HOW OFTEN DURING THE LAST YEAR HAVE YOU FAILED TO DO WHAT WAS NORMALLY EXPECTED FROM YOU BECAUSE OF DRINKING: 0

## 2022-01-11 ASSESSMENT — PATIENT HEALTH QUESTIONNAIRE - PHQ9
1. LITTLE INTEREST OR PLEASURE IN DOING THINGS: 0
2. FEELING DOWN, DEPRESSED OR HOPELESS: 0
SUM OF ALL RESPONSES TO PHQ QUESTIONS 1-9: 0
SUM OF ALL RESPONSES TO PHQ9 QUESTIONS 1 & 2: 0

## 2022-01-11 NOTE — PROGRESS NOTES
Medicare Annual Wellness Visit  Name: Omaira May Date: 2022   MRN: [id number] Sex: Male   Age: 73 y.o. Ethnicity: Non- / Non    : 1955 Race: White (non-)      Cherry Mcdonald is here for Medicare AWV    Screenings for behavioral, psychosocial and functional/safety risks, and cognitive dysfunction are all negative except as indicated below. These results, as well as other patient data from the [address] form, are documented in Flowsheets linked to this Encounter. Well Adult Physical: Patient here for a comprehensive physical exam.The patient reports all chronic issues are well controlled on current medications. Do you take any herbs or supplements that were not prescribed by a doctor? no Are you taking calcium supplements? no Are you taking aspirin daily? no   History:  Any STD's in the past? none    Occasional tinnitus becoming more frequent and louder. He is willing to return for hearing test to reassess. Sometimes associated with sore throat and PND. Better on daily xyzal and saline nasal spray. Hypertension and hyperlipidemia are under good control but he has now also gone through heart cath and stent placement. He is feeling well and has lost weight and now exercising daily. Sinus pressure comes and goes all year. Now also having headaches in the evening. Left eye pressure occasional, tylenol and motrin helps. Occasional blood in the mucus from the nose. Taking xyzal daily. GERD controlled on PPI. UC being followed by GI. Lower pressure with urination. ED happening more. No Known Allergies      Prior to Visit Medications    Medication Sig Taking?  Authorizing Provider   losartan (COZAAR) 50 MG tablet Take 1 tablet by mouth daily Yes Daniela Hanna MD   atorvastatin (LIPITOR) 40 MG tablet Take 1 tablet by mouth daily Yes Daniela Hanna MD   terbinafine (LAMISIL) 250 MG tablet Take 1 tablet by mouth daily Yes Stone Retana MD   levocetirizine (XYZAL) 5 MG tablet TAKE 1 TABLET BY MOUTH ONCE NIGHTLY. Yes Stone Retana MD   CPAP Machine MISC by Does not apply route Change to CPAP pressure 9 cm H2O. Increase EPR to 3  DL in 2 weeks Yes Royce Linares APRN - CNP   metoprolol tartrate (LOPRESSOR) 25 MG tablet Take 0.5 tablets by mouth 2 times daily Yes Antolin Kumar MD   omeprazole (PRILOSEC) 20 MG delayed release capsule Take 1 capsule by mouth Daily  Patient taking differently: Take 20 mg by mouth every other day  Yes Stone Retana MD   Acetaminophen (TYLENOL) 325 MG CAPS Take by mouth Yes Historical Provider, MD   multivitamin [facility]) per tablet Take 1 tablet by mouth daily. Yes Historical Provider, MD   aspirin 81 MG EC tablet Take 81 mg by mouth daily. Yes Historical Provider, MD   nitroGLYCERIN (NITROSTAT) 0.4 MG SL tablet up to max of 3 total doses. If no relief after 1 dose, call 911.   Patient not taking: Reported on 1/11/2022  Arelis Macdonald MD         Past Medical History:   Diagnosis Date    Calcium oxalate renal stones 2004    he has undergone ECSW lithotripsy    Coronary artery disease involving native coronary artery of native heart without angina pectoris     Essential hypertension 04/28/2016    GERD (gastroesophageal reflux disease)     Hyperlipidemia 03/2003    hypercholsterolemia    Osteoarthritis     Palpitation 4/20/2021    Ulcerative colitis ([facility]        Past Surgical History:   Procedure Laterality Date    CARDIAC CATHETERIZATION      COLONOSCOPY      4/2010=surveillance for ulcerative collitis    FINGER TRIGGER RELEASE      KNEE ARTHROSCOPY      ROTATOR CUFF REPAIR      TONSILLECTOMY           Family History   Problem Relation Age of Onset    Cancer Mother         NHL    Heart Disease Maternal Grandmother         CAD    Diabetes Maternal Grandfather     Heart Disease Paternal Grandfather         MI    Cancer Paternal Grandfather         PROSTATE       CareTeam (Including outside providers/suppliers regularly involved in providing care):   Patient Care Team:  Isabel Cano MD as PCP - Erin Collier MD as PCP - [facility] Provider  Jhonny Hare MD as Consulting Physician (Gastroenterology)  SAI Cid - CNP (Nurse Practitioner)    Wt Readings from Last 3 Encounters:   01/11/22 185 lb (83.9 kg)   08/11/21 185 lb (83.9 kg)   08/06/21 182 lb (82.6 kg)     Vitals:    01/11/22 0808   BP: 110/64   Site: Right Upper Arm   Position: Sitting   Cuff Size: Medium Adult   Pulse: 54   Resp: 16   Temp: 98 °F (36.7 °C)   TempSrc: Temporal   SpO2: 100%   Weight: 185 lb (83.9 kg)   Height: 5' 5.98\" (1.676 m)     Body mass index is 29.87 kg/m². Based upon direct observation of the patient, evaluation of cognition reveals recent and remote memory intact. Physical Exam   Constitutional: Vital signs are normal. He appears well-developed and well-nourished. He is active. HENT:   Head: Normocephalic and atraumatic. Right Ear: Tympanic membrane, external ear and ear canal normal. No drainage or tenderness. Left Ear: Tympanic membrane, external ear and ear canal normal. No drainage or tenderness. Nose: Nose normal. No mucosal edema or rhinorrhea. Mouth/Throat: Uvula is midline, oropharynx is clear and moist and mucous membranes are normal. Mucous membranes are not pale. Normal dentition. No posterior oropharyngeal edema or posterior oropharyngeal erythema. Eyes: Lids are normal. Right eye exhibits no chemosis and no discharge. Left eye exhibits no chemosis and no drainage. Right conjunctiva has no hemorrhage. Left conjunctiva has no hemorrhage. Right eye exhibits normal extraocular motion. Left eye exhibits normal extraocular motion. Right pupil is round and reactive. Left pupil is round and reactive. Pupils are equal.   Cardiovascular: Normal rate, regular rhythm, S1 normal, S2 normal and normal heart sounds. Exam reveals no gallop.     No murmur heard.  Pulmonary/Chest: Effort normal and breath sounds normal. No respiratory distress. He has no wheezes. He has no rhonchi. He has no rales. Abdominal: Soft. Normal appearance and bowel sounds are normal. He exhibits no distension and no mass. There is no hepatosplenomegaly. No tenderness. He has no rigidity, no rebound and no guarding. No hernia. Musculoskeletal:        Right lower leg: He exhibits no edema. Left lower leg: He exhibits no edema. Neurological: He is alert. Oriented and pleasent  Skin:  Right 2nd toe with thick black nail    Patient's complete Health Risk Assessment and screening values have been reviewed and are found in Flowsheets. The following problems were reviewed today and where indicated follow up appointments were made and/or referrals ordered. Positive Risk Factor Screenings with Interventions:         Substance History:  Social History     Tobacco History     Smoking Status  Former Smoker Quit date  3/29/1988 Smoking Frequency  1 pack/day for 10 years (10 pk yrs)    Smokeless Tobacco Use  Current User          Alcohol History     Alcohol Use Status  Yes Comment  1-2 daily          Drug Use     Drug Use Status  No          Sexual Activity     Sexually Active  Not Asked               Alcohol Screening: Audit-C Score: 3  Total Score: 3    A score of 8 or more is associated with harmful or hazardous drinking. A score of 13 or more in women, and 15 or more in men, is likely to indicate alcohol dependence. Substance Abuse Interventions:  · Alcohol misuse/dependence:  patient agrees to limit alcohol intake to a moderate level- no more than 14 drinks/week and 4 drinks per occasion for men, or no more than 7 drinks/week and 3 drinks/occasion for women        General Health and ACP:  General  In general, how would you say your health is?: Very Good  In the past 7 days, have you experienced any of the following?  New or Increased Pain, New or Increased Fatigue, Loneliness, Social Isolation, Stress or Anger?: None of These  Do you get the social and emotional support that you need?: Yes  Do you have a Living Will?: (!) No  Advance Directives     Power of [facility] Will ACP-Advance Directive ACP-Power of     Not on File Not on File Not on File Not on File      General Health Risk Interventions:  · No Living Will: patient to bring in a copy      Safety:  Safety  Do you have working smoke detectors?: Yes  Have all throw rugs been removed or fastened?: (!) No  Do you have non-slip mats or surfaces in all bathtubs/showers?: Yes  Do all of your stairways have a railing or banister?: Yes  Are your doorways, halls and stairs free of clutter?: Yes  Do you always fasten your seatbelt when you are in a car?: Yes  Safety Interventions:  · Patient declines any further evaluation/treatment for this issue       Personalized Preventive Plan   Current Health Maintenance Status  Immunization History   Administered Date(s) Administered    COVID-19, J&J, PF, 0.5 mL 03/10/2021    COVID-19, Moderna, Primary or Immunocompromised, PF, 100mcg/0.5mL 10/22/2021    Influenza Virus Vaccine 10/18/2012, 10/14/2013, 09/24/2019, 10/12/2020    Influenza Whole 10/14/2013    Influenza, High Dose (Fluzone 65 yrs and older) 10/05/2021    Influenza, Quadv, IM, (6 mo and older Fluzone, Flulaval, Fluarix and 3 yrs and older Afluria) 10/27/2016    Influenza, Quadv, IM, PF (6 mo and older Fluzone, Flulaval, Fluarix, and 3 yrs and older Afluria) 10/20/2017, 10/05/2018    Pneumococcal Conjugate 13-valent (Effie Chough) 10/06/2017    Tdap (Boostrix, Adacel) 11/22/2016    Zoster Recombinant (Shingrix) 04/15/2019, 06/27/2019        Health Maintenance   Topic Date Due    AAA screen  Never done    Pneumococcal 65+ years Vaccine (2 of 2 - PPSV23) 09/19/2021    Depression Screen  06/01/2022    Lipid screen  01/05/2023    Potassium monitoring  01/05/2023    Creatinine monitoring  01/05/2023    DTaP/Tdap/Td vaccine (2 - Td or Tdap) 11/22/2026    Colon cancer screen colonoscopy  11/09/2030    Flu vaccine  Completed    Shingles Vaccine  Completed    COVID-19 Vaccine  Completed    Hepatitis A vaccine  Aged Out    Hepatitis B vaccine  Aged Out    Hib vaccine  Aged Out    Meningococcal (ACWY) vaccine  Aged Out    Hepatitis C screen  Discontinued    HIV screen  Discontinued     Recommendations for Preventive Services Due: see orders and patient instructions/AVS.  . Recommended screening schedule for the next 5-10 years is provided to the patient in written form: see Patient Instructions/AVS.    Tisha Garibay was seen today for medicare awv. Diagnoses and all orders for this visit:    Routine general medical examination at a health care facility    Essential hypertension  -     losartan (COZAAR) 50 MG tablet; Take 1 tablet by mouth daily    Pure hypercholesterolemia  -     atorvastatin (LIPITOR) 40 MG tablet; Take 1 tablet by mouth daily    Chronic seasonal allergic rhinitis due to pollen    Gastroesophageal reflux disease without esophagitis    Coronary artery disease involving native coronary artery of native heart without angina pectoris    Primary osteoarthritis involving multiple joints    Episodic cluster headache, not intractable    Other ulcerative colitis without complication (HCC)    Angina, class III (HCC)    Hypernatremia  -     Sodium; Future    Urinary frequency  -     PSA Prostatic Specific Antigen; Future    Erectile dysfunction, unspecified erectile dysfunction type  -     PSA Prostatic Specific Antigen; Future    Onychomycosis  -     terbinafine (LAMISIL) 250 MG tablet; Take 1 tablet by mouth daily  -     Hepatic Function Panel;  Future    Other orders  -     PNEUMOVAX 23 subcutaneous/IM (Pneumococcal polysaccharide vaccine 23-valent >= 1yo)           No change to medication   Continue healthy diet and exercise  Yearly eye exam  Daily foot inspection  Yearly flu shot  Monitor glucose regularly  Daily aspirin  Regular labs : A1c quarterly, lipids every 6 months and BMP quaterly

## 2022-01-11 NOTE — PATIENT INSTRUCTIONS
Personalized Preventive Plan for Adelaida Dawson - 1/11/2022  Medicare offers a range of preventive health benefits. Some of the tests and screenings are paid in full while other may be subject to a deductible, co-insurance, and/or copay. Some of these benefits include a comprehensive review of your medical history including lifestyle, illnesses that may run in your family, and various assessments and screenings as appropriate. After reviewing your medical record and screening and assessments performed today your provider may have ordered immunizations, labs, imaging, and/or referrals for you. A list of these orders (if applicable) as well as your Preventive Care list are included within your After Visit Summary for your review. Other Preventive Recommendations:    · A preventive eye exam performed by an eye specialist is recommended every 1-2 years to screen for glaucoma; cataracts, macular degeneration, and other eye disorders. · A preventive dental visit is recommended every 6 months. · Try to get at least 150 minutes of exercise per week or 10,000 steps per day on a pedometer . · Order or download the FREE \"Exercise & Physical Activity: Your Everyday Guide\" from [employer]. Call 4-271.317.2056 or search [employer] online. · You need 1106-1695 mg of calcium and 7109-7089 IU of vitamin D per day. It is possible to meet your calcium requirement with diet alone, but a vitamin D supplement is usually necessary to meet this goal.  · When exposed to the sun, use a sunscreen that protects against both UVA and UVB radiation with an SPF of 30 or greater. Reapply every 2 to 3 hours or after sweating, drying off with a towel, or swimming. · Always wear a seat belt when traveling in a car. Always wear a helmet when riding a bicycle or motorcycle.

## 2022-01-11 NOTE — PROGRESS NOTES
[address] [place] [address] Varghese Morse  Dept: [phone]  Dept Fax: [phone]  Loc: Reese Hampton is a 73 y.o. male who presents today for:  No chief complaint on file. HPI:     HPI    Well Adult Physical: Patient here for a comprehensive physical exam.The patient reports all chronic issues are well controlled on current medications. Do you take any herbs or supplements that were not prescribed by a doctor? no Are you taking calcium supplements? no Are you taking aspirin daily? no   History:  Any STD's in the past? none    Occasional tinnitus becoming more frequent and louder. He is willing to return for hearing test to reassess. Sometimes associated with sore throat and PND. ***    Hypertension and hyperlipidemia are under good control but he has now also gone through heart cath and stent placement. He is feeling well and has lost weight and now exercising daily. ***    Sinus pressure comes and goes all year. Now also having headaches in the evening. Left eye pressure the past month , tylenol and motrin helps. Occasional blood in the mucus from the nose. GERD controlled on PPI. UC being followed by GI. Reviewed chart forpast medical history , surgical history , allergies, social history , family history and medications.     Health Maintenance   Topic Date Due    AAA screen  Never done    COVID-19 Vaccine (2 - Booster for Chio series) 05/05/2021    Pneumococcal 65+ years Vaccine (2 of 2 - PPSV23) 09/19/2021    Depression Screen  06/01/2022    Lipid screen  01/05/2023    Potassium monitoring  01/05/2023    Creatinine monitoring  01/05/2023    DTaP/Tdap/Td vaccine (2 - Td or Tdap) 11/22/2026    Colon cancer screen colonoscopy  11/09/2030    Flu vaccine  Completed    Shingles Vaccine  Completed    Hepatitis A vaccine  Aged Out    Hepatitis B vaccine  Aged Out    Hib vaccine  Aged Out    Meningococcal (ACWY) vaccine  Aged Out    Hepatitis C screen  Discontinued    HIV screen  Discontinued       Subjective:      Constitutional:Negative for fever, chills, diaphoresis, activity change, appetite change and fatigue. HENT: Negative for hearing loss, ear pain, congestion, sore throat, rhinorrhea, postnasal drip and ear discharge. Eyes: Negative for photophobia and visual disturbance. Respiratory: Negative for cough, chest tightness, shortness of breath and wheezing. Cardiovascular: Negative for chest pain and leg swelling. Gastrointestinal: Negative for nausea, vomiting, abdominal pain, diarrhea and constipation. Genitourinary: Negative for dysuria, urgency and frequency. Neurological: Negative for weakness, light-headedness and headaches. Psychiatric/Behavioral: Negative for sleep disturbance.      :     There were no vitals filed for this visit. Wt Readings from Last 3 Encounters:   08/11/21 185 lb (83.9 kg)   08/06/21 182 lb (82.6 kg)   08/04/21 175 lb (79.4 kg)       Physical Exam        Assessment/Plan   Diagnoses and all orders for this visit:    Essential hypertension    Pure hypercholesterolemia    Chronic seasonal allergic rhinitis due to pollen    Gastroesophageal reflux disease without esophagitis    Coronary artery disease involving native coronary artery of native heart without angina pectoris    Primary osteoarthritis involving multiple joints    Episodic cluster headache, not intractable    Other ulcerative colitis without complication ([address]    Angina, class III ([address]          Reccommended tobacco cessation options including pharmacologicmethods, counseled great than 3 minutes during this visit:  Yes  []  No  []    Patient given educational materials - see patient instructions. Discussed use, benefit, and side effectsof prescribed medications. All patient questions answered. Pt voiced understanding. Reviewed health maintenance.   Instructed to continue current medications, diet and exercise. Patient agreed with treatment plan. Followup as directed.      Electronically signed by Bj Chiu MD

## 2022-01-11 NOTE — PROGRESS NOTES
[facility] [employer] [facility]   Patient of Nal    History of palpitations    Battery okay    Episodes:  Edwardo episodes- wenkeback & mobitz 2 at night time-- Hakim aware - monitoring symptoms   False afib   ?tachy -- artifact

## 2022-01-12 PROCEDURE — G2066 INTER DEVC REMOTE 30D: HCPCS | Performed by: INTERNAL MEDICINE

## 2022-01-12 PROCEDURE — 93298 REM INTERROG DEV EVAL SCRMS: CPT | Performed by: INTERNAL MEDICINE

## 2022-01-13 ENCOUNTER — TELEPHONE (OUTPATIENT)
Dept: CARDIOLOGY CLINIC | Age: 67
End: 2022-01-13

## 2022-01-13 NOTE — TELEPHONE ENCOUNTER
PT CALLED THE OFFICE AND HAS A TOE FUNGUS AND THEY STARTED HIM ON LAMISIL TABLETS  HE SAID THERE IS A DRUG INTERATION WITH THE LAMISIL (PILL FORM, NOT CREAM,)AND THE TOPROL. HE WANTS TO KNOW IF HE CAN TAKE THEM TOGETHER OR NOTE . I CHECKED THIS WEB MD/ DRUG INTERACTION  AND THIS IS WHAT CAMED UP :    MONITOR CLOSELY  Significant interaction possible (monitoring by your doctor required). Terbinafine + Metoprolol  Terbinafine will increase the level or effect of Metoprolol by altering drug metabolism.

## 2022-01-17 ENCOUNTER — HOSPITAL ENCOUNTER (OUTPATIENT)
Age: 67
Setting detail: SPECIMEN
Discharge: HOME | End: 2022-01-17
Payer: MEDICARE

## 2022-01-17 ENCOUNTER — TELEPHONE (OUTPATIENT)
Dept: FAMILY MEDICINE CLINIC | Age: 67
End: 2022-01-17

## 2022-01-17 DIAGNOSIS — R50.9 FEVER, UNSPECIFIED FEVER CAUSE: ICD-10-CM

## 2022-01-17 DIAGNOSIS — R50.9 FEVER, UNSPECIFIED FEVER CAUSE: Primary | ICD-10-CM

## 2022-01-17 PROCEDURE — 87636 SARSCOV2 & INF A&B AMP PRB: CPT

## 2022-01-18 DIAGNOSIS — U07.1 COVID-19: ICD-10-CM

## 2022-01-18 LAB
INFLUENZA A: NOT DETECTED
INFLUENZA B: NOT DETECTED
SARS-COV-2 RNA, RT PCR: DETECTED

## 2022-01-18 RX ORDER — SODIUM CHLORIDE 9 MG/ML
INJECTION, SOLUTION INTRAVENOUS CONTINUOUS
OUTPATIENT
Start: 2022-01-18

## 2022-01-18 RX ORDER — DIPHENHYDRAMINE HYDROCHLORIDE 50 MG/ML
50 INJECTION INTRAMUSCULAR; INTRAVENOUS
OUTPATIENT
Start: 2022-01-18

## 2022-01-18 RX ORDER — ALBUTEROL SULFATE 90 UG/1
4 AEROSOL, METERED RESPIRATORY (INHALATION) PRN
OUTPATIENT
Start: 2022-01-18

## 2022-01-18 RX ORDER — SODIUM CHLORIDE 0.9 % (FLUSH) 0.9 %
5-40 SYRINGE (ML) INJECTION PRN
OUTPATIENT
Start: 2022-01-18

## 2022-01-18 RX ORDER — ONDANSETRON 2 MG/ML
8 INJECTION INTRAMUSCULAR; INTRAVENOUS
OUTPATIENT
Start: 2022-01-18

## 2022-01-18 RX ORDER — SODIUM CHLORIDE 9 MG/ML
25 INJECTION, SOLUTION INTRAVENOUS PRN
OUTPATIENT
Start: 2022-01-18

## 2022-01-18 RX ORDER — ACETAMINOPHEN 325 MG/1
650 TABLET ORAL
OUTPATIENT
Start: 2022-01-18

## 2022-01-21 ENCOUNTER — HOSPITAL ENCOUNTER (OUTPATIENT)
Dept: GENERAL RADIOLOGY | Age: 67
Discharge: HOME | End: 2022-01-21
Payer: MEDICARE

## 2022-01-21 VITALS
SYSTOLIC BLOOD PRESSURE: 116 MMHG | OXYGEN SATURATION: 98 % | RESPIRATION RATE: 16 BRPM | TEMPERATURE: 97.3 F | HEART RATE: 62 BPM | DIASTOLIC BLOOD PRESSURE: 67 MMHG

## 2022-01-21 DIAGNOSIS — U07.1 COVID-19: Primary | ICD-10-CM

## 2022-01-21 PROCEDURE — 96365 THER/PROPH/DIAG IV INF INIT: CPT

## 2022-01-21 PROCEDURE — 6360000002 HC RX W HCPCS: Performed by: FAMILY MEDICINE

## 2022-01-21 PROCEDURE — 2580000003 HC RX 258: Performed by: FAMILY MEDICINE

## 2022-01-21 RX ORDER — SODIUM CHLORIDE 9 MG/ML
INJECTION, SOLUTION INTRAVENOUS CONTINUOUS
OUTPATIENT
Start: 2022-01-21

## 2022-01-21 RX ORDER — ALBUTEROL SULFATE 90 UG/1
4 AEROSOL, METERED RESPIRATORY (INHALATION) PRN
OUTPATIENT
Start: 2022-01-21

## 2022-01-21 RX ORDER — SODIUM CHLORIDE 0.9 % (FLUSH) 0.9 %
5-40 SYRINGE (ML) INJECTION PRN
OUTPATIENT
Start: 2022-01-21

## 2022-01-21 RX ORDER — SODIUM CHLORIDE 9 MG/ML
25 INJECTION, SOLUTION INTRAVENOUS PRN
OUTPATIENT
Start: 2022-01-21

## 2022-01-21 RX ORDER — ONDANSETRON 2 MG/ML
8 INJECTION INTRAMUSCULAR; INTRAVENOUS
OUTPATIENT
Start: 2022-01-21

## 2022-01-21 RX ORDER — ACETAMINOPHEN 325 MG/1
650 TABLET ORAL
OUTPATIENT
Start: 2022-01-21

## 2022-01-21 RX ORDER — DIPHENHYDRAMINE HYDROCHLORIDE 50 MG/ML
50 INJECTION INTRAMUSCULAR; INTRAVENOUS
OUTPATIENT
Start: 2022-01-21

## 2022-01-21 RX ADMIN — IMDEVIMAB: 300 INJECTION, SOLUTION, CONCENTRATE INTRAVENOUS at 10:00

## 2022-01-21 NOTE — PROGRESS NOTES
Met: yes   Safety:         (Environmental)   [place] to environment  [facility] ID band is correct and in place/ allergy band as needed   Assess for fall risk   Initiate fall precautions as applicable (fall band, side rails, etc.)   Call light within reach   Bed in low position/ wheels locked    Met: yes   Pain:        Assess pain level and characteristics   Administer analgesics as ordered   Assess effectiveness of pain management and report to MD as needed    Met: yes   Knowledge Deficit:   Assess baseline knowledge   Provide teaching at level of understanding   Provide teaching via preferred learning method   Evaluate teaching effectiveness    Met: yes Hemodynamic/Respiratory Status:       (Pre and Post Procedure Monitoring)   Assess/Monitor vital signs and LOC   Assess Baseline SpO2 prior to any sedation   Obtain weight/height   Assess vital signs/ LOC until patient meets discharge criteria   Monitor procedure site and notify MD of any issues    Met: yes   Infection-Risk of Central Venous Catheter:   Monitor for infection signs and symptoms (catheter site redness, temperature elevation, etc)   Assess for infection risks   Educate regarding infection prevention   Manage central venous catheter (flushes/ dressing changes per protocol)

## 2022-02-15 ENCOUNTER — PROCEDURE VISIT (OUTPATIENT)
Dept: CARDIOLOGY CLINIC | Age: 67
End: 2022-02-15
Payer: MEDICARE

## 2022-02-15 DIAGNOSIS — R00.2 PALPITATION: Primary | ICD-10-CM

## 2022-02-15 PROCEDURE — G2066 INTER DEVC REMOTE 30D: HCPCS | Performed by: INTERNAL MEDICINE

## 2022-02-15 PROCEDURE — 93298 REM INTERROG DEV EVAL SCRMS: CPT | Performed by: INTERNAL MEDICINE

## 2022-02-15 NOTE — PROGRESS NOTES
DR Elif Oliveira PT   [employer] [facility] REMOTE   BATTERY OK      12/28/21 DEVICE CALLING THIS A TACHY EPISODE. LOOKS LIKE ARTIFACT WITH SOME T WAVE OVERSENSING     12/27/21 ANOTHER EPISODE OF ARTIFACT     PT IS HAVING ALOT OF TAMRA EPISODES  KNOWN WENKEBACK  LOOKS LIKE HE IS HAVING SOME FALSE AFIB EPISODES AS WELL.

## 2022-02-21 ENCOUNTER — OFFICE VISIT (OUTPATIENT)
Dept: PULMONOLOGY | Age: 67
End: 2022-02-21
Payer: MEDICARE

## 2022-02-21 VITALS
OXYGEN SATURATION: 99 % | HEIGHT: 66 IN | WEIGHT: 186.4 LBS | DIASTOLIC BLOOD PRESSURE: 65 MMHG | HEART RATE: 55 BPM | BODY MASS INDEX: 29.96 KG/M2 | SYSTOLIC BLOOD PRESSURE: 110 MMHG

## 2022-02-21 DIAGNOSIS — E66.9 OBESITY (BMI 30-39.9): ICD-10-CM

## 2022-02-21 DIAGNOSIS — Z99.89 OSA ON CPAP: Primary | ICD-10-CM

## 2022-02-21 DIAGNOSIS — G47.33 OSA ON CPAP: Primary | ICD-10-CM

## 2022-02-21 PROCEDURE — 1123F ACP DISCUSS/DSCN MKR DOCD: CPT | Performed by: NURSE PRACTITIONER

## 2022-02-21 PROCEDURE — G8427 DOCREV CUR MEDS BY ELIG CLIN: HCPCS | Performed by: NURSE PRACTITIONER

## 2022-02-21 PROCEDURE — 4040F PNEUMOC VAC/ADMIN/RCVD: CPT | Performed by: NURSE PRACTITIONER

## 2022-02-21 PROCEDURE — 4004F PT TOBACCO SCREEN RCVD TLK: CPT | Performed by: NURSE PRACTITIONER

## 2022-02-21 PROCEDURE — G8482 FLU IMMUNIZE ORDER/ADMIN: HCPCS | Performed by: NURSE PRACTITIONER

## 2022-02-21 PROCEDURE — 99213 OFFICE O/P EST LOW 20 MIN: CPT | Performed by: NURSE PRACTITIONER

## 2022-02-21 PROCEDURE — 3017F COLORECTAL CA SCREEN DOC REV: CPT | Performed by: NURSE PRACTITIONER

## 2022-02-21 PROCEDURE — G8417 CALC BMI ABV UP PARAM F/U: HCPCS | Performed by: NURSE PRACTITIONER

## 2022-02-21 ASSESSMENT — ENCOUNTER SYMPTOMS
EYES NEGATIVE: 1
GASTROINTESTINAL NEGATIVE: 1
RESPIRATORY NEGATIVE: 1
VOMITING: 0
DIARRHEA: 0
STRIDOR: 0
NAUSEA: 0
CHEST TIGHTNESS: 0
WHEEZING: 0
ALLERGIC/IMMUNOLOGIC NEGATIVE: 1

## 2022-02-21 NOTE — PROGRESS NOTES
[facility]      Jacqueline Thakur         [id number]  2/21/2022   Chief Complaint   Patient presents with    Follow-up     3 mo MELVIN f/u with [facility]        Pt of Dr. Julien POTTER Download:   Original or initial AHI: 36.4    Date of initial study: 1/26/21      Compliant  97%     Noncompliant 3 %     PAP Type air senseLevel  9   Avg Hrs/Day 6 hours and 37 min  AHI: 4.7   Recorded compliance dates , 1/19/22  to 2/17/22   Machine/Mfg:   [x] ResMed    [] Respironics/Dreamstation   Interface:   [] Nasal    [] Nasal pillows   [x] FFM      Provider:      [x] -YENNY     []Rita     [] Jon    [] Quyen Ohara    [] Teofiloermans               [] [facility]      [] Adaptive    [] [address]      [] Other    Neck Size: 18  Mallampati Mallampati 2  ESS:  6  SAQLI: 91    Here is a scan of the most recent download:          Presentation:   Blair Segal presents for sleep medicine follow up for obstructive sleep apnea  Since the last visit, Blair Segal has been compliant with his CPAP therapy and continues to see benefit from its use. Patient down 40# since start of CPAP therapy     Equipment issues: The pressure is  acceptable, the mask is acceptable     Sleep issues:  Do you feel better? Yes  More rested? Yes   Better concentration? yes    Progress History:   Since last visit any new medical issues? No  New ER or hospital visits? No  Any new or changes in medicines? No  Any new sleep medicines? No    Review of Systems -   Review of Systems   Constitutional: Negative. Negative for chills, fever and unexpected weight change. HENT: Negative. Eyes: Negative. Respiratory: Negative. Negative for chest tightness, wheezing and stridor. Cardiovascular: Negative for chest pain and leg swelling. Gastrointestinal: Negative. Negative for diarrhea, nausea and vomiting. Endocrine: Negative. Genitourinary: Negative. Negative for dysuria. Musculoskeletal: Negative. Skin: Negative. Allergic/Immunologic: Negative. Neurological: Negative. Hematological: Negative. Psychiatric/Behavioral: Negative. Physical Exam:    BMI:  Body mass index is 30.09 kg/m². Wt Readings from Last 3 Encounters:   02/21/22 186 lb 6.4 oz (84.6 kg)   01/11/22 185 lb (83.9 kg)   08/11/21 185 lb (83.9 kg)     Weight stable / unchanged  Vitals: /65 (Site: Left Upper Arm, Position: Sitting, Cuff Size: Small Adult)   Pulse 55   Ht 5' 6\" (1.676 m)   Wt 186 lb 6.4 oz (84.6 kg)   SpO2 99% Comment: on r/a  BMI 30.09 kg/m²       Physical Exam  Vitals and nursing note reviewed. Constitutional:       General: He is not in acute distress. Appearance: He is well-developed. He is obese. HENT:      Head: Normocephalic and atraumatic. Neck:      Trachea: No tracheal deviation. Cardiovascular:      Rate and Rhythm: Normal rate and regular rhythm. Heart sounds: Normal heart sounds. No murmur heard. Pulmonary:      Effort: Pulmonary effort is normal. No respiratory distress. Breath sounds: Normal breath sounds. No stridor. No wheezing or rales. Chest:      Chest wall: No tenderness. Abdominal:      General: Bowel sounds are normal. There is no distension. Palpations: Abdomen is soft. Skin:     General: Skin is warm and dry. Capillary Refill: Capillary refill takes less than 2 seconds. Neurological:      Mental Status: He is alert and oriented to person, place, and time. Psychiatric:         Behavior: Behavior normal.         Thought Content: Thought content normal.         Judgment: Judgment normal.           ASSESSMENT/DIAGNOSIS     Diagnosis Orders   1. MELVIN on CPAP  DME Order for CPAP as OP   2. Obesity (BMI 30-39. 9)              Plan   Do you need any equipment today? Yes     - Download reviewed and discussed with patient  - He  was advised to continue current positive airway pressure therapy with above described pressure.    - He  advised to keep good compliance with current recommended pressure to get optimal results and clinical improvement  - Recommend 7-9 hours of sleep with PAP  - He was advised to call [employer] company regarding supplies if needed.   -He call my office for earlier appointment if needed for worsening of sleep symptoms.   - He was instructed on weight loss- patient doing well with weight loss discussed could repeat sleep study after patient loses 50+ pounds - patient verbalized understanding   - Candie Zimmerman was educated about my impression and plan. Patient verbalizesunderstanding.   We will see Anne-Marie Daniel back in: 1 year with download    Information added by my medical assistant/LPN was reviewed today    Electronically signed by Darryl Favre, APRN - CNP on 2/21/2022 at 8:58 AM

## 2022-03-16 ENCOUNTER — NURSE ONLY (OUTPATIENT)
Dept: LAB | Age: 67
End: 2022-03-16

## 2022-03-16 DIAGNOSIS — B35.1 ONYCHOMYCOSIS: ICD-10-CM

## 2022-03-16 LAB
ALBUMIN SERPL-MCNC: 4.1 G/DL (ref 3.5–5.1)
ALP BLD-CCNC: 83 U/L (ref 38–126)
ALT SERPL-CCNC: 38 U/L (ref 11–66)
AST SERPL-CCNC: 25 U/L (ref 5–40)
BILIRUB SERPL-MCNC: 0.5 MG/DL (ref 0.3–1.2)
BILIRUBIN DIRECT: < 0.2 MG/DL (ref 0–0.3)
TOTAL PROTEIN: 6.7 G/DL (ref 6.1–8)

## 2022-03-22 ENCOUNTER — PROCEDURE VISIT (OUTPATIENT)
Dept: CARDIOLOGY CLINIC | Age: 67
End: 2022-03-22
Payer: MEDICARE

## 2022-03-22 DIAGNOSIS — R00.2 PALPITATION: Primary | ICD-10-CM

## 2022-03-22 NOTE — PROGRESS NOTES
[facility] [employer] [facility]   Patient of Nallu    History of palpitations/mobitz 1 & 2    Battery okay    Episodes:  False tachy -- artifact  [facility]      [facility] for patient to send manual download to review all episodes    Manual download scanned into chart.    More episodes bradycardia rates 39-42  afib -- below to review

## 2022-03-23 PROCEDURE — 93298 REM INTERROG DEV EVAL SCRMS: CPT | Performed by: INTERNAL MEDICINE

## 2022-03-23 PROCEDURE — G2066 INTER DEVC REMOTE 30D: HCPCS | Performed by: INTERNAL MEDICINE

## 2022-03-29 ENCOUNTER — TELEPHONE (OUTPATIENT)
Dept: CARDIOLOGY CLINIC | Age: 67
End: 2022-03-29

## 2022-03-30 NOTE — TELEPHONE ENCOUNTER
Pt called in. Questions answered about monitor. Was sleeping at time of heart block. Does not remember any symptoms at that time.

## 2022-04-26 ENCOUNTER — PROCEDURE VISIT (OUTPATIENT)
Dept: CARDIOLOGY CLINIC | Age: 67
End: 2022-04-26
Payer: MEDICARE

## 2022-04-26 DIAGNOSIS — R00.2 PALPITATION: Primary | ICD-10-CM

## 2022-04-26 PROCEDURE — 93298 REM INTERROG DEV EVAL SCRMS: CPT | Performed by: INTERNAL MEDICINE

## 2022-04-26 PROCEDURE — G2066 INTER DEVC REMOTE 30D: HCPCS | Performed by: INTERNAL MEDICINE

## 2022-04-26 NOTE — PROGRESS NOTES
[facility] [employer] [facility]   Patient of Nallu/Hakim     History of palpitations/ mobitz 1 & 2    Battery okay    Episodes:  Bradycardia minimum rate 34  Mobitz 2  afib per device - False?

## 2022-05-31 ENCOUNTER — PROCEDURE VISIT (OUTPATIENT)
Dept: CARDIOLOGY CLINIC | Age: 67
End: 2022-05-31
Payer: MEDICARE

## 2022-05-31 DIAGNOSIS — R00.2 PALPITATION: Primary | ICD-10-CM

## 2022-06-01 PROCEDURE — G2066 INTER DEVC REMOTE 30D: HCPCS | Performed by: INTERNAL MEDICINE

## 2022-06-01 PROCEDURE — 93298 REM INTERROG DEV EVAL SCRMS: CPT | Performed by: INTERNAL MEDICINE

## 2022-06-17 ENCOUNTER — TELEPHONE (OUTPATIENT)
Dept: CARDIOLOGY CLINIC | Age: 67
End: 2022-06-17

## 2022-06-17 NOTE — TELEPHONE ENCOUNTER
[facility] episode    No dx of afib/ no OAC'S       Do you think this is new onset afib/ episode lasted for 28 minutes at 120 bpm ?????????

## 2022-07-05 ENCOUNTER — PROCEDURE VISIT (OUTPATIENT)
Dept: CARDIOLOGY CLINIC | Age: 67
End: 2022-07-05
Payer: MEDICARE

## 2022-07-05 DIAGNOSIS — R00.2 PALPITATION: Primary | ICD-10-CM

## 2022-07-05 PROCEDURE — 93298 REM INTERROG DEV EVAL SCRMS: CPT | Performed by: INTERNAL MEDICINE

## 2022-07-05 PROCEDURE — G2066 INTER DEVC REMOTE 30D: HCPCS | Performed by: INTERNAL MEDICINE

## 2022-07-06 NOTE — PROGRESS NOTES
[address] [place] [address] Varghese Morse  Dept: [phone]  Dept Fax: [phone]  Loc: [phone]    Donya Maki is a 73 y.o. male who presents today for:  Chief Complaint   Patient presents with    6 Month Follow-Up     recheck toe on right foot            HPI:     HPI    Well Adult Physical: Patient here for a comprehensive physical exam.The patient reports all chronic issues are well controlled on current medications. Do you take any herbs or supplements that were not prescribed by a doctor? no Are you taking calcium supplements? no Are you taking aspirin daily? no   History:  Any STD's in the past? none    Occasional tinnitus becoming more frequent and louder. He is willing to return for hearing test to reassess. Sometimes associated with sore throat and PND. Better on daily xyzal and saline nasal spray. Hypertension and hyperlipidemia are under good control but he has now also gone through heart cath and stent placement. He is feeling well and has lost weight and now exercising daily. Sinus pressure comes and goes all year. Now also having headaches in the evening. Left eye pressure occasional, tylenol and motrin helps. Occasional blood in the mucus from the nose. Taking xyzal daily. GERD controlled on PPI. UC being followed by GI. Lower pressure with urination. ED happening more. Reviewed chart forpast medical history , surgical history , allergies, social history , family history and medications.     Health Maintenance   Topic Date Due    AAA screen  Never done    Flu vaccine (1) 09/01/2022    Lipids  01/05/2023    Depression Screen  01/11/2023    Prostate Specific Antigen (PSA) Screening or Monitoring  01/11/2023    Annual Wellness Visit (AWV)  01/12/2023    DTaP/Tdap/Td vaccine (2 - Td or Tdap) 11/22/2026    Colorectal Cancer Screen  11/09/2030    Shingles vaccine  Completed    Pneumococcal 65+ years Vaccine  Completed    COVID-19 Vaccine  Completed    Hepatitis A vaccine  Aged Out    Hepatitis B vaccine  Aged Out    Hib vaccine  Aged Out    Meningococcal (ACWY) vaccine  Aged Out    Hepatitis C screen  Discontinued    HIV screen  Discontinued       Subjective:      Constitutional:Negative for fever, chills, diaphoresis, activity change, appetite change and fatigue. HENT: Negative for hearing loss, ear pain, congestion, sore throat, rhinorrhea, postnasal drip and ear discharge. Eyes: Negative for photophobia and visual disturbance. Respiratory: Negative for cough, chest tightness, shortness of breath and wheezing. Cardiovascular: Negative for chest pain and leg swelling. Gastrointestinal: Negative for nausea, vomiting, abdominal pain, diarrhea and constipation. Genitourinary: Negative for dysuria, urgency and frequency. Neurological: Negative for weakness, light-headedness and headaches. Psychiatric/Behavioral: Negative for sleep disturbance.      :     Vitals:    07/11/22 0918   BP: 120/74   Site: Right Upper Arm   Position: Sitting   Cuff Size: Large Adult   Pulse: 56   Resp: 16   Temp: 98.9 °F (37.2 °C)   TempSrc: Temporal   Weight: 185 lb (83.9 kg)     Wt Readings from Last 3 Encounters:   07/11/22 185 lb (83.9 kg)   02/21/22 186 lb 6.4 oz (84.6 kg)   01/11/22 185 lb (83.9 kg)       Physical Exam  Constitutional: Vital signs are normal. He appears well-developed and well-nourished. He is active. HENT:   Head: Normocephalic and atraumatic. Right Ear: Tympanic membrane, external ear and ear canal normal. No drainage or tenderness. Left Ear: Tympanic membrane, external ear and ear canal normal. No drainage or tenderness. Nose: Nose normal. No mucosal edema or rhinorrhea. Mouth/Throat: Uvula is midline, oropharynx is clear and moist and mucous membranes are normal. Mucous membranes are not pale. Normal dentition.  No posterior oropharyngeal edema or posterior oropharyngeal erythema. Eyes: Lids are normal. Right eye exhibits no chemosis and no discharge. Left eye exhibits no chemosis and no drainage. Right conjunctiva has no hemorrhage. Left conjunctiva has no hemorrhage. Right eye exhibits normal extraocular motion. Left eye exhibits normal extraocular motion. Right pupil is round and reactive. Left pupil is round and reactive. Pupils are equal.   Cardiovascular: Normal rate, regular rhythm, S1 normal, S2 normal and normal heart sounds. Exam reveals no gallop. No murmur heard. Pulmonary/Chest: Effort normal and breath sounds normal. No respiratory distress. He has no wheezes. He has no rhonchi. He has no rales. Abdominal: Soft. Normal appearance and bowel sounds are normal. He exhibits no distension and no mass. There is no hepatosplenomegaly. No tenderness. He has no rigidity, no rebound and no guarding. No hernia. Musculoskeletal:        Right lower leg: He exhibits no edema. Left lower leg: He exhibits no edema. Neurological: He is alert. Oriented and pleasent        Assessment/Plan   Whitley Jaimes was seen today for 6 month follow-up. Diagnoses and all orders for this visit:    Essential hypertension  -     TSH with Reflex; Future    Pure hypercholesterolemia  -     Comprehensive Metabolic Panel, Fasting; Future  -     Lipid Panel; Future    Coronary artery disease involving native coronary artery of native heart without angina pectoris    Gastroesophageal reflux disease without esophagitis  -     CBC; Future    Chronic seasonal allergic rhinitis due to pollen    Primary osteoarthritis involving multiple joints    Episodic cluster headache, not intractable    Other ulcerative colitis without complication (HCC)    Hypernatremia    Angina, class III (HCC)    Erectile dysfunction, unspecified erectile dysfunction type  -     PSA, Prostatic Specific Antigen;  Future    Onychomycosis  -     terbinafine (LAMISIL) 250 MG tablet; Take 1 tablet by mouth daily    Palpitation    Oral lesion    AV block    RBBB    No change to medications   Continue healthy diet and exercise  Aspirin daily    Discussed use, benefit, and side effectsof prescribed medications. All patient questions answered. Pt voiced understanding. Reviewed health maintenance. Instructed to continue current medications, diet and exercise. Patient agreed with treatment plan. Followup as directed.      Electronically signed by Ramez Pratt MD

## 2022-07-11 ENCOUNTER — OFFICE VISIT (OUTPATIENT)
Dept: FAMILY MEDICINE CLINIC | Age: 67
End: 2022-07-11
Payer: MEDICARE

## 2022-07-11 VITALS
DIASTOLIC BLOOD PRESSURE: 74 MMHG | WEIGHT: 185 LBS | BODY MASS INDEX: 29.86 KG/M2 | HEART RATE: 56 BPM | TEMPERATURE: 98.9 F | SYSTOLIC BLOOD PRESSURE: 120 MMHG | RESPIRATION RATE: 16 BRPM

## 2022-07-11 DIAGNOSIS — I44.30 AV BLOCK: ICD-10-CM

## 2022-07-11 DIAGNOSIS — B35.1 ONYCHOMYCOSIS: ICD-10-CM

## 2022-07-11 DIAGNOSIS — R00.2 PALPITATION: ICD-10-CM

## 2022-07-11 DIAGNOSIS — M15.9 PRIMARY OSTEOARTHRITIS INVOLVING MULTIPLE JOINTS: ICD-10-CM

## 2022-07-11 DIAGNOSIS — G44.019 EPISODIC CLUSTER HEADACHE, NOT INTRACTABLE: ICD-10-CM

## 2022-07-11 DIAGNOSIS — K13.70 ORAL LESION: ICD-10-CM

## 2022-07-11 DIAGNOSIS — K21.9 GASTROESOPHAGEAL REFLUX DISEASE WITHOUT ESOPHAGITIS: ICD-10-CM

## 2022-07-11 DIAGNOSIS — K51.80 OTHER ULCERATIVE COLITIS WITHOUT COMPLICATION (HCC): ICD-10-CM

## 2022-07-11 DIAGNOSIS — I10 ESSENTIAL HYPERTENSION: Primary | ICD-10-CM

## 2022-07-11 DIAGNOSIS — I25.10 CORONARY ARTERY DISEASE INVOLVING NATIVE CORONARY ARTERY OF NATIVE HEART WITHOUT ANGINA PECTORIS: ICD-10-CM

## 2022-07-11 DIAGNOSIS — J30.1 CHRONIC SEASONAL ALLERGIC RHINITIS DUE TO POLLEN: ICD-10-CM

## 2022-07-11 DIAGNOSIS — E87.0 HYPERNATREMIA: ICD-10-CM

## 2022-07-11 DIAGNOSIS — I20.9 ANGINA, CLASS III (HCC): ICD-10-CM

## 2022-07-11 DIAGNOSIS — I45.10 RBBB: ICD-10-CM

## 2022-07-11 DIAGNOSIS — N52.9 ERECTILE DYSFUNCTION, UNSPECIFIED ERECTILE DYSFUNCTION TYPE: ICD-10-CM

## 2022-07-11 DIAGNOSIS — E78.00 PURE HYPERCHOLESTEROLEMIA: ICD-10-CM

## 2022-07-11 PROCEDURE — 4004F PT TOBACCO SCREEN RCVD TLK: CPT | Performed by: FAMILY MEDICINE

## 2022-07-11 PROCEDURE — G8417 CALC BMI ABV UP PARAM F/U: HCPCS | Performed by: FAMILY MEDICINE

## 2022-07-11 PROCEDURE — G8427 DOCREV CUR MEDS BY ELIG CLIN: HCPCS | Performed by: FAMILY MEDICINE

## 2022-07-11 PROCEDURE — 3017F COLORECTAL CA SCREEN DOC REV: CPT | Performed by: FAMILY MEDICINE

## 2022-07-11 PROCEDURE — 1123F ACP DISCUSS/DSCN MKR DOCD: CPT | Performed by: FAMILY MEDICINE

## 2022-07-11 PROCEDURE — 99214 OFFICE O/P EST MOD 30 MIN: CPT | Performed by: FAMILY MEDICINE

## 2022-07-11 RX ORDER — TERBINAFINE HYDROCHLORIDE 250 MG/1
250 TABLET ORAL DAILY
Qty: 90 TABLET | Refills: 0 | Status: SHIPPED | OUTPATIENT
Start: 2022-07-11 | End: 2023-01-07

## 2022-07-11 SDOH — ECONOMIC STABILITY: FOOD INSECURITY: WITHIN THE PAST 12 MONTHS, THE FOOD YOU BOUGHT JUST DIDN'T LAST AND YOU DIDN'T HAVE MONEY TO GET MORE.: NEVER TRUE

## 2022-07-11 SDOH — ECONOMIC STABILITY: FOOD INSECURITY: WITHIN THE PAST 12 MONTHS, YOU WORRIED THAT YOUR FOOD WOULD RUN OUT BEFORE YOU GOT MONEY TO BUY MORE.: NEVER TRUE

## 2022-07-11 ASSESSMENT — SOCIAL DETERMINANTS OF HEALTH (SDOH): HOW HARD IS IT FOR YOU TO PAY FOR THE VERY BASICS LIKE FOOD, HOUSING, MEDICAL CARE, AND HEATING?: NOT HARD AT ALL

## 2022-08-09 ENCOUNTER — PROCEDURE VISIT (OUTPATIENT)
Dept: CARDIOLOGY CLINIC | Age: 67
End: 2022-08-09
Payer: MEDICARE

## 2022-08-09 DIAGNOSIS — R00.2 PALPITATION: Primary | ICD-10-CM

## 2022-08-09 PROCEDURE — G2066 INTER DEVC REMOTE 30D: HCPCS | Performed by: INTERNAL MEDICINE

## 2022-08-09 PROCEDURE — 93298 REM INTERROG DEV EVAL SCRMS: CPT | Performed by: INTERNAL MEDICINE

## 2022-08-31 ENCOUNTER — OFFICE VISIT (OUTPATIENT)
Dept: CARDIOLOGY CLINIC | Age: 67
End: 2022-08-31
Payer: MEDICARE

## 2022-08-31 VITALS
DIASTOLIC BLOOD PRESSURE: 60 MMHG | BODY MASS INDEX: 29.57 KG/M2 | SYSTOLIC BLOOD PRESSURE: 108 MMHG | HEIGHT: 66 IN | WEIGHT: 184 LBS | HEART RATE: 51 BPM

## 2022-08-31 DIAGNOSIS — I25.10 CORONARY ARTERY DISEASE INVOLVING NATIVE CORONARY ARTERY OF NATIVE HEART WITHOUT ANGINA PECTORIS: Primary | ICD-10-CM

## 2022-08-31 PROCEDURE — G8417 CALC BMI ABV UP PARAM F/U: HCPCS | Performed by: INTERNAL MEDICINE

## 2022-08-31 PROCEDURE — 99214 OFFICE O/P EST MOD 30 MIN: CPT | Performed by: INTERNAL MEDICINE

## 2022-08-31 PROCEDURE — 4004F PT TOBACCO SCREEN RCVD TLK: CPT | Performed by: INTERNAL MEDICINE

## 2022-08-31 PROCEDURE — 1123F ACP DISCUSS/DSCN MKR DOCD: CPT | Performed by: INTERNAL MEDICINE

## 2022-08-31 PROCEDURE — 93000 ELECTROCARDIOGRAM COMPLETE: CPT | Performed by: INTERNAL MEDICINE

## 2022-08-31 PROCEDURE — 3017F COLORECTAL CA SCREEN DOC REV: CPT | Performed by: INTERNAL MEDICINE

## 2022-08-31 PROCEDURE — G8427 DOCREV CUR MEDS BY ELIG CLIN: HCPCS | Performed by: INTERNAL MEDICINE

## 2022-08-31 NOTE — PROGRESS NOTES
[address] [facility] [address]  Dept: [phone]  Dept Fax: [phone]  Loc: [phone]    Visit Date: 8/31/2022    Mr. Alexandro Smith is a 73 y.o. male  who presented for:  Chief Complaint   Patient presents with    Coronary Artery Disease    Hyperlipidemia       HPI:   72 yo M c hx of CAD s/p PCI of LAD of 12/22/20, HTN, GERD, HLD, MELVIN on CPAP, Ulcerative colitis is here for a follow up. Has Linq device. Does show some Mobitz type 2 blocks at night time. Known to EP. No symptoms of presyncope and syncope. Denies any chest pain, sob, palpitations, lightheadedness, dizziness, orthopnea, PND or pedal edema. Current Outpatient Medications:     Acetaminophen (TYLENOL ARTHRITIS PAIN PO), Take by mouth, Disp: , Rfl:     NONFORMULARY, Saline spray as needed, Disp: , Rfl:     terbinafine (LAMISIL) 250 MG tablet, Take 1 tablet by mouth daily, Disp: 90 tablet, Rfl: 0    losartan (COZAAR) 50 MG tablet, Take 1 tablet by mouth daily, Disp: 90 tablet, Rfl: 3    atorvastatin (LIPITOR) 40 MG tablet, Take 1 tablet by mouth daily, Disp: 90 tablet, Rfl: 3    nitroGLYCERIN (NITROSTAT) 0.4 MG SL tablet, up to max of 3 total doses. If no relief after 1 dose, call 911., Disp: 25 tablet, Rfl: 3    multivitamin (THERAGRAN) per tablet, Take 1 tablet by mouth daily. , Disp: , Rfl:     aspirin 81 MG EC tablet, Take 81 mg by mouth daily. , Disp: , Rfl:     CPAP Machine MISC, by Does not apply route Change to CPAP pressure 9 cm H2O. Increase EPR to 3 DL in 2 weeks, Disp: 1 each, Rfl: 0    Past Medical History  Shelton Supriya  has a past medical history of Calcium oxalate renal stones, Coronary artery disease involving native coronary artery of native heart without angina pectoris, Essential hypertension, GERD (gastroesophageal reflux disease), Hyperlipidemia, Osteoarthritis, Palpitation, and Ulcerative colitis ([facility] [address]     Social History  Sheltonshe Epps  reports that he quit smoking about 34 years ago. He has a 10.00 pack-year smoking history. He uses smokeless tobacco. He reports current alcohol use. He reports that he does not use drugs. Family History  Donna Gresham family history includes Cancer in his mother and paternal grandfather; Diabetes in his maternal grandfather; Heart Disease in his maternal grandmother and paternal grandfather. Past Surgical History   Past Surgical History:   Procedure Laterality Date    CARDIAC CATHETERIZATION      COLONOSCOPY      4/2010=surveillance for ulcerative collitis    FINGER TRIGGER RELEASE      INSERTABLE CARDIAC MONITOR  01/2021    loop    KNEE ARTHROSCOPY      ROTATOR CUFF REPAIR      TONSILLECTOMY         Subjective:     REVIEW OF SYSTEMS  Constitutional: denies sweats, chills and fever  HENT: denies  congestion, sinus pressure, sneezing and sore throat. Eyes: denies  pain, discharge, redness and itching. Respiratory: denies apnea, cough  Gastrointestinal: denies blood in stool, constipation, diarrhea   Endocrine: denies cold intolerance, heat intolerance, polydipsia. Genitourinary: denies dysuria, enuresis, flank pain and hematuria. Musculoskeletal: denies arthralgias, joint swelling and neck pain. Neurological: denies numbness and headaches. Psychiatric/Behavioral: denies agitation, confusion, decreased concentration and dysphoric mood    All others reviewed and are negative. Objective:     /60   Pulse 51   Ht 5' 6\" (1.676 m)   Wt 184 lb (83.5 kg)   BMI 29.70 kg/m²     Wt Readings from Last 3 Encounters:   08/31/22 184 lb (83.5 kg)   07/11/22 185 lb (83.9 kg)   02/21/22 186 lb 6.4 oz (84.6 kg)     BP Readings from Last 3 Encounters:   08/31/22 108/60   07/11/22 120/74   02/21/22 110/65       PHYSICAL EXAM  Constitutional: Oriented to person, place, and time. Appears well-developed and well-nourished. HENT:   Head: Normocephalic and atraumatic.    Eyes: EOM are normal. Pupils are equal, round, and reactive to light. Neck: Normal range of motion. Neck supple. No JVD present. Cardiovascular: Normal rate , normal heart sounds and intact distal pulses. Pulmonary/Chest: Effort normal and breath sounds normal. No respiratory distress. No wheezes. No rales. Abdominal: Soft. Bowel sounds are normal. No distension. There is no tenderness. Musculoskeletal: Normal range of motion. No edema. Neurological: Alert and oriented to person, place, and time. No cranial nerve deficit. Coordination normal.   Skin: Skin is warm and dry. Psychiatric: Normal mood and affect.        No results found for: CKTOTAL, CKMB, CKMBINDEX    Lab Results   Component Value Date/Time    WBC 5.0 01/05/2022 09:52 AM    RBC 4.45 01/05/2022 09:52 AM    RBC 4.42 03/20/2012 05:47 AM    HGB 13.9 01/05/2022 09:52 AM    HCT 42.1 01/05/2022 09:52 AM    MCV 94.6 01/05/2022 09:52 AM    MCH 31.2 01/05/2022 09:52 AM    MCHC 33.0 01/05/2022 09:52 AM    RDW 12.3 03/29/2018 05:36 AM     01/05/2022 09:52 AM    MPV 9.1 01/05/2022 09:52 AM       Lab Results   Component Value Date/Time     01/11/2022 09:04 AM    K 4.5 01/05/2022 09:52 AM    K 3.9 03/15/2021 09:08 AM     01/05/2022 09:52 AM    CO2 28 01/05/2022 09:52 AM    BUN 18 01/05/2022 09:52 AM    LABALBU 4.1 03/16/2022 08:45 AM    LABALBU 4.4 03/20/2012 05:47 AM    CREATININE 0.8 01/05/2022 09:52 AM    CALCIUM 9.7 01/05/2022 09:52 AM    LABGLOM >90 01/05/2022 09:52 AM    GLUCOSE 97 03/15/2021 09:08 AM    GLUCOSE 97 03/20/2012 05:47 AM       Lab Results   Component Value Date/Time    ALKPHOS 83 03/16/2022 08:45 AM    ALT 38 03/16/2022 08:45 AM    AST 25 03/16/2022 08:45 AM    PROT 6.7 03/16/2022 08:45 AM    BILITOT 0.5 03/16/2022 08:45 AM    BILIDIR <0.2 03/16/2022 08:45 AM    LABALBU 4.1 03/16/2022 08:45 AM    LABALBU 4.4 03/20/2012 05:47 AM       No results found for: MG    Lab Results   Component Value Date    INR 0.97 03/15/2021    INR 0.93 12/22/2020         No results found for: LABA1C    Lab Results   Component Value Date/Time    TRIG 47 01/05/2022 09:52 AM    HDL 57 01/05/2022 09:52 AM    LDLCALC 61 01/05/2022 09:52 AM       Lab Results   Component Value Date/Time    TSH 1.430 01/05/2022 09:52 AM         Testing Reviewed:      I haveindividually reviewed the below cardiac tests    EKG:    ECHO: Results for orders placed during the hospital encounter of 12/22/20    ECHO Complete 2D W Doppler W Color    Narrative  Transthoracic Echocardiography Report (TTE)    Demographics    Patient Name    Bekah Troy  Gender               Male  L    MR #            [id number]       Race                     Ethnicity    Account #       [de-identified]       Room Number          0016    Accession       [id number]      Date of Study        12/22/2020  Number    Date of Birth   1955      Referring Physician  Kelli Thompson MD    Age             60 year(s)      Key Viveros, [facility] [[email or web address]]    Interpreting         Fly Odonnell MD  Physician            Echo reader of the  week    Procedure    Type of Study    TTE procedure:ECHOCARDIOGRAM COMPLETE 2D W DOPPLER W COLOR. Procedure Date  Date: 12/22/2020 Start: 07:19 AM    Study Location: Bedside  Technical Quality: Adequate visualization    Indications:Chest pain. Additional Medical History:Ex Smoker, Hypertension, Hyperlipidemia. Patient Status: Routine    Height: 66 inches Weight: 202 pounds BSA: 2.01 m^2 BMI: 32.6 kg/m^2    BP: 129/74 mmHg    Allergies  - No Known Allergies. Conclusions    Summary  limited echo  Ejection fraction is visually estimated at 60%. Overall left ventricular function is normal.  No evidence of any pericardial effusion.     Signature    ----------------------------------------------------------------  Electronically signed by Fly Odonnell MD (Interpreting  physician) on 12/27/2020 at 12:07 PM  ----------------------------------------------------------------    Findings    Mitral Valve  Structurally normal mitral valve. Aortic Valve  Aortic valve appears tricuspid. Tricuspid Valve  Tricuspid valve is structurally normal.    Pulmonic Valve  The pulmonic valve was not well visualized . Pulmonic valve is structurally normal.    Left Atrium  Normal size left atrium. Left Ventricle  Ejection fraction is visually estimated at 60%. Overall left ventricular function is normal.    Right Atrium  The right atrium is of normal size. Right Ventricle  Normal right ventricular size and function. Pericardial Effusion  No evidence of any pericardial effusion.     M-Mode/2D Measurements & Calculations    LV Diastolic    LV Systolic Dimension: 3  AV Cusp Separation: 2 cmLA  Dimension: 4.3  cm                        Dimension: 4 cmAO Root  cm              LV Volume Diastolic: 05.4 Dimension: 3.1 cmLA Area: 24.8  LV FS:30.2 %    ml                        cm^2  LV PW           LV Volume Systolic: 35 ml  Diastolic: 1.1  LV EDV/LV EDV Index: 83.1  cm              ml/41 m^2LV ESV/LV ESV  Septum          Index: 35 ml/17 m^2       RV Diastolic Dimension: 3 cm  Diastolic: 1.1  EF Calculated: 57.9 %  cm                                        LA/Aorta: 1.29  Ascending Aorta: 3.4 cm  LA volume/Index: 80.6 ml /40m^2    Doppler Measurements & Calculations    MV Peak E-Wave: 80.6 cm/s  AV Peak Velocity: 121  LVOT Peak Velocity: 87.4  MV Peak A-Wave: 41.6 cm/s  cm/s                   cm/s  MV E/A Ratio: 1.94         AV Peak Gradient: 5.86 LVOT Peak Gradient: 3  MV Peak Gradient: 2.6 mmHg mmHg                   mmHg    MV Deceleration Time: 264                         TV Peak E-Wave: 54.4  msec                                              cm/s  TV Peak A-Wave: 41.1  IVRT: 106 msec         cm/s  MV E' Septal Velocity: 7.8  cm/s                                              TV Peak Gradient: 1.18  MV A' Septal Velocity: 11 AV DVI (Vmax):0.72     mmHg  cm/s  MV E' Lateral Velocity:                           PV Peak Velocity: 70.7  9.4 cm/s                                          cm/s  MV A' Lateral Velocity:                           PV Peak Gradient: 2 mmHg  13.2 cm/s  E/E' septal: 10.33  E/E' lateral: 8.57    [id number]      STRESS:    CATH:    Assessment/Plan       Diagnosis Orders   1. Coronary artery disease involving native coronary artery of native heart without angina pectoris  EKG 12 Lead          CAD s/p PCI on LAD 12/2020  UC  HTN  HLD  MELVIN on CPAP    Has some mobitz blocks at night time  On CPAP  No lightheadedness  EKG today with SB, 1st degree AVB  Will d/c metoprolol today and monitor  Continue DAPT  On brilinta, no bleeding issues, no side effects  On statins  d/c HCTZ  Completed cardiac rehab  The patient is asked to make an attempt to improve diet and exercise patterns to aid in medical management of this problem. Advised more plant based nutrition/meditarrean diet   Advised patient to call office or seek immediate medical attention if there is any new onset of  any chest pain, sob, palpitations, lightheadedness, dizziness, orthopnea, PND or pedal edema. All medication side effects were discussed in details. Thank youfor allowing me to participate in the care of this patient. Please do not hesitate to contact me for any further questions. Return in about 1 year (around 8/31/2023), or if symptoms worsen or fail to improve, for Review testing, Regular follow up.        Electronically signed by Elana Liu MD [facility]  8/31/2022 at 7:46 AM EDT

## 2022-09-06 ENCOUNTER — TELEPHONE (OUTPATIENT)
Dept: CARDIOLOGY CLINIC | Age: 67
End: 2022-09-06

## 2022-09-06 NOTE — TELEPHONE ENCOUNTER
Unscheduled [facility] [employer] Linq  Pt of José/Karla    9/4/22- 54 minutes ? ?aflutter - please review.

## 2022-09-13 ENCOUNTER — PROCEDURE VISIT (OUTPATIENT)
Dept: CARDIOLOGY CLINIC | Age: 67
End: 2022-09-13
Payer: MEDICARE

## 2022-09-13 DIAGNOSIS — R00.2 PALPITATION: Primary | ICD-10-CM

## 2022-09-13 PROCEDURE — 93298 REM INTERROG DEV EVAL SCRMS: CPT | Performed by: INTERNAL MEDICINE

## 2022-09-13 PROCEDURE — G2066 INTER DEVC REMOTE 30D: HCPCS | Performed by: INTERNAL MEDICINE

## 2022-09-13 NOTE — PROGRESS NOTES
[facility] medtronic reveal     Battery ok   Hx palpitations  Known Wenkebach  Episodes device is calling AF, indeterminate    A flutter?

## 2022-10-18 ENCOUNTER — TELEPHONE (OUTPATIENT)
Dept: CARDIOLOGY CLINIC | Age: 67
End: 2022-10-18

## 2022-10-18 ENCOUNTER — PROCEDURE VISIT (OUTPATIENT)
Dept: CARDIOLOGY CLINIC | Age: 67
End: 2022-10-18
Payer: MEDICARE

## 2022-10-18 DIAGNOSIS — R00.2 PALPITATION: Primary | ICD-10-CM

## 2022-10-18 PROCEDURE — G2066 INTER DEVC REMOTE 30D: HCPCS | Performed by: INTERNAL MEDICINE

## 2022-10-18 PROCEDURE — 93298 REM INTERROG DEV EVAL SCRMS: CPT | Performed by: INTERNAL MEDICINE

## 2022-10-18 NOTE — TELEPHONE ENCOUNTER
Progress Notes  Tawny Mattson MD (Physician)   Cardiology  Noted. Aflutter. Please let Dr. Deepti Camilo know about this. Progress Notes  Pooja Almonte, RN (Registered Nurse)  [address] Linq   Patient of [facility]/Artie     History of palpitations/wenkebach/mobitz 2     Battery okay     Episodes:  Bradycardia   10/17/22 at 2320 - 13 seconds mobitz 2  10/18/22 at 0120 -Mobitz 2  Many runs afib per device - please review- no OAC     9/4/22 appeared aflutter per [facility] -monitoring. Call to patient. No signs/symptoms of low rates. Metoprolol d/c'd at [facility] appt 8/31/22. BP has been trending up. /82 yesterday, but mainly 120's.

## 2022-10-18 NOTE — PROGRESS NOTES
[facility] [employer] Linq   Patient of [facility]/Karla    History of palpitations/wenkebach/mobitz 2    Battery okay    Episodes:  Bradycardia   10/17/22 at 2320 - 13 seconds mobitz 2  10/18/22 at 0120 -Mobitz 2  Many runs afib per device - please review- no OAC    9/4/22 appeared aflutter per [facility] -monitoring. Call to patient. No signs/symptoms of low rates. Metoprolol d/c'd at [facility] appt 8/31/22. BP has been trending up. /82 yesterday, but mainly 120's.

## 2022-10-21 ENCOUNTER — OFFICE VISIT (OUTPATIENT)
Dept: CARDIOLOGY CLINIC | Age: 67
End: 2022-10-21
Payer: MEDICARE

## 2022-10-21 VITALS
WEIGHT: 185.8 LBS | HEART RATE: 60 BPM | DIASTOLIC BLOOD PRESSURE: 77 MMHG | HEIGHT: 66 IN | BODY MASS INDEX: 29.86 KG/M2 | SYSTOLIC BLOOD PRESSURE: 123 MMHG

## 2022-10-21 DIAGNOSIS — I48.92 ATRIAL FLUTTER, PAROXYSMAL (HCC): Primary | ICD-10-CM

## 2022-10-21 PROCEDURE — G8417 CALC BMI ABV UP PARAM F/U: HCPCS | Performed by: INTERNAL MEDICINE

## 2022-10-21 PROCEDURE — 1123F ACP DISCUSS/DSCN MKR DOCD: CPT | Performed by: INTERNAL MEDICINE

## 2022-10-21 PROCEDURE — 4004F PT TOBACCO SCREEN RCVD TLK: CPT | Performed by: INTERNAL MEDICINE

## 2022-10-21 PROCEDURE — 99214 OFFICE O/P EST MOD 30 MIN: CPT | Performed by: INTERNAL MEDICINE

## 2022-10-21 PROCEDURE — G8427 DOCREV CUR MEDS BY ELIG CLIN: HCPCS | Performed by: INTERNAL MEDICINE

## 2022-10-21 PROCEDURE — G8484 FLU IMMUNIZE NO ADMIN: HCPCS | Performed by: INTERNAL MEDICINE

## 2022-10-21 PROCEDURE — 3017F COLORECTAL CA SCREEN DOC REV: CPT | Performed by: INTERNAL MEDICINE

## 2022-10-21 RX ORDER — LEVOCETIRIZINE DIHYDROCHLORIDE 5 MG/1
5 TABLET, FILM COATED ORAL NIGHTLY
COMMUNITY

## 2022-10-21 NOTE — PROGRESS NOTES
[address] [facility] ([place] [address]  Dept: [phone]  Dept Fax: [phone]  Loc: [phone]    Visit Date: 10/21/2022    Mr. Sarah Balderrama is a 78 y.o. male  who presented for:  Chief Complaint   Patient presents with    Check-Up    Coronary Artery Disease       HPI:   77 yo M c hx of CAD s/p PCI of LAD of 12/22/20, HTN, GERD, HLD, MELVIN on CPAP, Ulcerative colitis is here for a follow up. Has Linq device. Known to EP. He had 54 minutes of aflutter. He denies any symptoms at that time. He is here to discuss further. Current Outpatient Medications:     levocetirizine (XYZAL) 5 MG tablet, Take 5 mg by mouth nightly, Disp: , Rfl:     rivaroxaban (XARELTO) 20 MG TABS tablet, Take 1 tablet by mouth daily (with breakfast), Disp: 90 tablet, Rfl: 1    Acetaminophen (TYLENOL ARTHRITIS PAIN PO), Take by mouth, Disp: , Rfl:     NONFORMULARY, Saline spray as needed, Disp: , Rfl:     terbinafine (LAMISIL) 250 MG tablet, Take 1 tablet by mouth daily, Disp: 90 tablet, Rfl: 0    losartan (COZAAR) 50 MG tablet, Take 1 tablet by mouth daily, Disp: 90 tablet, Rfl: 3    atorvastatin (LIPITOR) 40 MG tablet, Take 1 tablet by mouth daily, Disp: 90 tablet, Rfl: 3    CPAP Machine MISC, by Does not apply route Change to CPAP pressure 9 cm H2O. Increase EPR to 3 DL in 2 weeks, Disp: 1 each, Rfl: 0    nitroGLYCERIN (NITROSTAT) 0.4 MG SL tablet, up to max of 3 total doses. If no relief after 1 dose, call 911., Disp: 25 tablet, Rfl: 3    multivitamin (THERAGRAN) per tablet, Take 1 tablet by mouth daily. , Disp: , Rfl:     aspirin 81 MG EC tablet, Take 81 mg by mouth daily.   , Disp: , Rfl:     Past Medical History  Apryl Alexander  has a past medical history of Calcium oxalate renal stones, Coronary artery disease involving native coronary artery of native heart without angina pectoris, Essential hypertension, GERD (gastroesophageal reflux disease), Hyperlipidemia, Osteoarthritis, Palpitation, and Ulcerative colitis ([facility] [address] Social History  Luciana Cespedes  reports that he quit smoking about 34 years ago. He has a 10.00 pack-year smoking history. He uses smokeless tobacco. He reports current alcohol use. He reports that he does not use drugs. Family History  Luciana Cespedes family history includes Cancer in his mother and paternal grandfather; Diabetes in his maternal grandfather; Heart Disease in his maternal grandmother and paternal grandfather. Past Surgical History   Past Surgical History:   Procedure Laterality Date    CARDIAC CATHETERIZATION      COLONOSCOPY      4/2010=surveillance for ulcerative collitis    FINGER TRIGGER RELEASE      INSERTABLE CARDIAC MONITOR  01/2021    loop    KNEE ARTHROSCOPY      ROTATOR CUFF REPAIR      TONSILLECTOMY         Subjective:     REVIEW OF SYSTEMS  Constitutional: denies sweats, chills and fever  HENT: denies  congestion, sinus pressure, sneezing and sore throat. Eyes: denies  pain, discharge, redness and itching. Respiratory: denies apnea, cough  Gastrointestinal: denies blood in stool, constipation, diarrhea   Endocrine: denies cold intolerance, heat intolerance, polydipsia. Genitourinary: denies dysuria, enuresis, flank pain and hematuria. Musculoskeletal: denies arthralgias, joint swelling and neck pain. Neurological: denies numbness and headaches. Psychiatric/Behavioral: denies agitation, confusion, decreased concentration and dysphoric mood    All others reviewed and are negative. Objective:     /77   Pulse 60   Ht 5' 6\" (1.676 m)   Wt 185 lb 12.8 oz (84.3 kg)   BMI 29.99 kg/m²     Wt Readings from Last 3 Encounters:   10/21/22 185 lb 12.8 oz (84.3 kg)   08/31/22 184 lb (83.5 kg)   07/11/22 185 lb (83.9 kg)     BP Readings from Last 3 Encounters:   10/21/22 123/77   08/31/22 108/60   07/11/22 120/74       PHYSICAL EXAM  Constitutional: Oriented to person, place, and time. Appears well-developed and well-nourished. HENT:   Head: Normocephalic and atraumatic. Eyes: EOM are normal. Pupils are equal, round, and reactive to light. Neck: Normal range of motion. Neck supple. No JVD present. Cardiovascular: Normal rate , normal heart sounds and intact distal pulses. Pulmonary/Chest: Effort normal and breath sounds normal. No respiratory distress. No wheezes. No rales. Abdominal: Soft. Bowel sounds are normal. No distension. There is no tenderness. Musculoskeletal: Normal range of motion. No edema. Neurological: Alert and oriented to person, place, and time. No cranial nerve deficit. Coordination normal.   Skin: Skin is warm and dry. Psychiatric: Normal mood and affect.        No results found for: CKTOTAL, CKMB, CKMBINDEX    Lab Results   Component Value Date/Time    WBC 5.0 01/05/2022 09:52 AM    RBC 4.45 01/05/2022 09:52 AM    RBC 4.42 03/20/2012 05:47 AM    HGB 13.9 01/05/2022 09:52 AM    HCT 42.1 01/05/2022 09:52 AM    MCV 94.6 01/05/2022 09:52 AM    MCH 31.2 01/05/2022 09:52 AM    MCHC 33.0 01/05/2022 09:52 AM    RDW 12.3 03/29/2018 05:36 AM     01/05/2022 09:52 AM    MPV 9.1 01/05/2022 09:52 AM       Lab Results   Component Value Date/Time     01/11/2022 09:04 AM    K 4.5 01/05/2022 09:52 AM    K 3.9 03/15/2021 09:08 AM     01/05/2022 09:52 AM    CO2 28 01/05/2022 09:52 AM    BUN 18 01/05/2022 09:52 AM    LABALBU 4.1 03/16/2022 08:45 AM    LABALBU 4.4 03/20/2012 05:47 AM    CREATININE 0.8 01/05/2022 09:52 AM    CALCIUM 9.7 01/05/2022 09:52 AM    LABGLOM >90 01/05/2022 09:52 AM    GLUCOSE 97 03/15/2021 09:08 AM    GLUCOSE 97 03/20/2012 05:47 AM       Lab Results   Component Value Date/Time    ALKPHOS 83 03/16/2022 08:45 AM    ALT 38 03/16/2022 08:45 AM    AST 25 03/16/2022 08:45 AM    PROT 6.7 03/16/2022 08:45 AM    BILITOT 0.5 03/16/2022 08:45 AM    BILIDIR <0.2 03/16/2022 08:45 AM    LABALBU 4.1 03/16/2022 08:45 AM    LABALBU 4.4 03/20/2012 05:47 AM       No results found for: MG    Lab Results   Component Value Date    INR 0.97 03/15/2021    INR 0.93 12/22/2020         No results found for: LABA1C    Lab Results   Component Value Date/Time    TRIG 47 01/05/2022 09:52 AM    HDL 57 01/05/2022 09:52 AM    LDLCALC 61 01/05/2022 09:52 AM       Lab Results   Component Value Date/Time    TSH 1.430 01/05/2022 09:52 AM         Testing Reviewed:      I haveindividually reviewed the below cardiac tests    EKG:    ECHO: Results for orders placed during the hospital encounter of 12/22/20    ECHO Complete 2D W Doppler W Color    Narrative  Transthoracic Echocardiography Report (TTE)    Demographics    Patient Name    Bere Jackson  Gender               Male  L    MR #            [id number]       Race                     Ethnicity    Account #       [de-identified]       Room Number          0016    Accession       [id number]      Date of Study        12/22/2020  Number    Date of Birth   1955      Referring Physician  Luis Alberto Sanabria MD    Age             60 year(s)      Darcy Senegal, RDCS    Interpreting         Garr Phalen MD  Physician            Echo reader of the  week    Procedure    Type of Study    TTE procedure:ECHOCARDIOGRAM COMPLETE 2D W DOPPLER W COLOR. Procedure Date  Date: 12/22/2020 Start: 07:19 AM    Study Location: Bedside  Technical Quality: Adequate visualization    Indications:Chest pain. Additional Medical History:Ex Smoker, Hypertension, Hyperlipidemia. Patient Status: Routine    Height: 66 inches Weight: 202 pounds BSA: 2.01 m^2 BMI: 32.6 kg/m^2    BP: 129/74 mmHg    Allergies  - No Known Allergies. Conclusions    Summary  limited echo  Ejection fraction is visually estimated at 60%. Overall left ventricular function is normal.  No evidence of any pericardial effusion.     Signature    ----------------------------------------------------------------  Electronically signed by Garr Phalen MD (Interpreting  physician) on 12/27/2020 at 12:07 PM  ----------------------------------------------------------------    Findings    Mitral Valve  Structurally normal mitral valve. Aortic Valve  Aortic valve appears tricuspid. Tricuspid Valve  Tricuspid valve is structurally normal.    Pulmonic Valve  The pulmonic valve was not well visualized . Pulmonic valve is structurally normal.    Left Atrium  Normal size left atrium. Left Ventricle  Ejection fraction is visually estimated at 60%. Overall left ventricular function is normal.    Right Atrium  The right atrium is of normal size. Right Ventricle  Normal right ventricular size and function. Pericardial Effusion  No evidence of any pericardial effusion.     M-Mode/2D Measurements & Calculations    LV Diastolic    LV Systolic Dimension: 3  AV Cusp Separation: 2 cmLA  Dimension: 4.3  cm                        Dimension: 4 cmAO Root  cm              LV Volume Diastolic: 55.3 Dimension: 3.1 cmLA Area: 24.8  LV FS:30.2 %    ml                        cm^2  LV PW           LV Volume Systolic: 35 ml  Diastolic: 1.1  LV EDV/LV EDV Index: 83.1  cm              ml/41 m^2LV ESV/LV ESV  Septum          Index: 35 ml/17 m^2       RV Diastolic Dimension: 3 cm  Diastolic: 1.1  EF Calculated: 57.9 %  cm                                        LA/Aorta: 1.29  Ascending Aorta: 3.4 cm  LA volume/Index: 80.6 ml /40m^2    Doppler Measurements & Calculations    MV Peak E-Wave: 80.6 cm/s  AV Peak Velocity: 121  LVOT Peak Velocity: 87.4  MV Peak A-Wave: 41.6 cm/s  cm/s                   cm/s  MV E/A Ratio: 1.94         AV Peak Gradient: 5.86 LVOT Peak Gradient: 3  MV Peak Gradient: 2.6 mmHg mmHg                   mmHg    MV Deceleration Time: 264                         TV Peak E-Wave: 54.4  msec                                              cm/s  TV Peak A-Wave: 41.1  IVRT: 106 msec         cm/s  MV E' Septal Velocity: 7.8  cm/s                                              TV Peak Gradient: 1.18  MV

## 2022-10-26 ENCOUNTER — TELEPHONE (OUTPATIENT)
Dept: CARDIOLOGY CLINIC | Age: 67
End: 2022-10-26

## 2022-10-26 NOTE — TELEPHONE ENCOUNTER
Pt is calling and states he can get a better price on his Xeralto prescription through [facility].  He needs a prescription for a 30 day supply sent to them by fax at [phone].   Please advise pt at [phone]

## 2022-10-26 NOTE — TELEPHONE ENCOUNTER
Pre op Risk Assessment    Procedure Colonoscopy  Physician Dr Angela Dupree  Date of surgery/procedure 11/14/22    Last OV 10/21/22  Last Stress none in epic  Last Echo 12/22/20  Last Cath 12/22/20  Last Stent 12/22/20  Is patient on blood thinners ASA & Xarelto  Hold Meds/how many days 1-2    Fax [phone] Attn: Rainer Mckinney

## 2022-11-07 NOTE — PATIENT INSTRUCTIONS
Follow up with José's office in regards to chest pain, SOB. Call Office when ready to schedule the Micra pacemaker. [phone] opt 1  Verna CARRANZA CMA       You may receive a survey regarding the care you received during your visit. Your input is valuable to us. We encourage you to complete and return your survey. We hope you will choose us in the future for your healthcare needs.

## 2022-11-09 ENCOUNTER — OFFICE VISIT (OUTPATIENT)
Dept: CARDIOLOGY CLINIC | Age: 67
End: 2022-11-09
Payer: MEDICARE

## 2022-11-09 ENCOUNTER — NURSE ONLY (OUTPATIENT)
Dept: CARDIOLOGY CLINIC | Age: 67
End: 2022-11-09

## 2022-11-09 ENCOUNTER — TELEPHONE (OUTPATIENT)
Dept: CARDIOLOGY CLINIC | Age: 67
End: 2022-11-09

## 2022-11-09 VITALS
DIASTOLIC BLOOD PRESSURE: 65 MMHG | WEIGHT: 185.4 LBS | BODY MASS INDEX: 29.8 KG/M2 | OXYGEN SATURATION: 96 % | HEIGHT: 66 IN | SYSTOLIC BLOOD PRESSURE: 116 MMHG | HEART RATE: 70 BPM

## 2022-11-09 DIAGNOSIS — I44.1 WENCKEBACH: Primary | ICD-10-CM

## 2022-11-09 DIAGNOSIS — I25.10 CORONARY ARTERY DISEASE INVOLVING NATIVE CORONARY ARTERY OF NATIVE HEART WITHOUT ANGINA PECTORIS: Primary | ICD-10-CM

## 2022-11-09 DIAGNOSIS — I48.92 ATRIAL FLUTTER, PAROXYSMAL (HCC): Primary | ICD-10-CM

## 2022-11-09 DIAGNOSIS — R06.02 SOB (SHORTNESS OF BREATH) ON EXERTION: ICD-10-CM

## 2022-11-09 DIAGNOSIS — R07.9 CHEST PAIN, UNSPECIFIED TYPE: ICD-10-CM

## 2022-11-09 PROCEDURE — 3078F DIAST BP <80 MM HG: CPT | Performed by: INTERNAL MEDICINE

## 2022-11-09 PROCEDURE — G8417 CALC BMI ABV UP PARAM F/U: HCPCS | Performed by: INTERNAL MEDICINE

## 2022-11-09 PROCEDURE — 1123F ACP DISCUSS/DSCN MKR DOCD: CPT | Performed by: INTERNAL MEDICINE

## 2022-11-09 PROCEDURE — 3017F COLORECTAL CA SCREEN DOC REV: CPT | Performed by: INTERNAL MEDICINE

## 2022-11-09 PROCEDURE — 3074F SYST BP LT 130 MM HG: CPT | Performed by: INTERNAL MEDICINE

## 2022-11-09 PROCEDURE — 99214 OFFICE O/P EST MOD 30 MIN: CPT | Performed by: INTERNAL MEDICINE

## 2022-11-09 PROCEDURE — G8484 FLU IMMUNIZE NO ADMIN: HCPCS | Performed by: INTERNAL MEDICINE

## 2022-11-09 PROCEDURE — 4004F PT TOBACCO SCREEN RCVD TLK: CPT | Performed by: INTERNAL MEDICINE

## 2022-11-09 PROCEDURE — G8427 DOCREV CUR MEDS BY ELIG CLIN: HCPCS | Performed by: INTERNAL MEDICINE

## 2022-11-09 NOTE — PROGRESS NOTES
Medtronic linq loop recorder/hakim    At flutter  Known Wenckebach (presenting rhythm)  Edwardo/pause during sleep hours/early morning     Wife states increased dizziness

## 2022-11-09 NOTE — PROGRESS NOTES
[address] ) [address] [facility]  Dept: [phone]  Cardiac Electrophysiology: Consultation Note  Patient's demographics:  Date:   11/9/2022  Patient name:              Niki Prince  YOB: 1955  Sex:    male   MRN:   [id number]    Primary Care Physician:  Trice Gómez MD    Referring Physician:  Lo Elizabeth MD    Reason for Consultation:  Atrial flutter and Mobitz type II AV second-degree AV block. Clinical Summary:  67/M with intermittent sinus bradycardia and Mobitz type II second-degree AV block, initially noted on ECG underwent a loop recorder implantation in 3/20/2021. He was recently noted to have episodes of narrow complex tachycardia, suspicion of atrial flutter and was started on apixaban for stroke prevention. He has baseline bifascicular block and intermittent high-grade AV block, predominantly during the night. His beta-blockers were discontinued which he normally used to take for coronary artery disease. Complains of intermittent bilateral chest pain which appears to be similar in nature as he had prior to getting a coronary stent. Complains of fatigue and intermittent lightheadedness but denies shortness of breath, orthopnea, syncope or lower extremity swelling. He is concerned that he may have active blockage in coronary artery. Medical Hx: Atrial flutter and Mobitz type II AV second-degree AV block (ILR 10/2022), bifascicular block, ILR implanted for bradycardia ( 3/15/21), HTN, HPL, CAD/PCI-LAD (12/20), , overweight, MELVIN/CPAP, GERD, ulcerative colitis and calcium oxalate nephrolithiasis. Review of systems:  Constitutional: Negative for chills and fever  HENT: Negative for congestion, sinus pressure, sneezing and sore throat. Eyes: Negative for pain, discharge, redness and itching.    Respiratory: Negative for apnea, cough  Gastrointestinal: Negative for blood in stool, constipation, diarrhea Endocrine: Negative for cold intolerance, heat intolerance, polydipsia. Genitourinary: Negative for dysuria, enuresis, flank pain and hematuria. Musculoskeletal: Negative for arthralgias, joint swelling and neck pain. Neurological: Negative for numbness and headaches. Psychiatric/Behavioral: Negative for agitation, confusion, decreased concentration and dysphoric mood.       Past Medical History[de-identified]  Past Medical History:   Diagnosis Date    Calcium oxalate renal stones     he has undergone ECSW lithotripsy    Coronary artery disease involving native coronary artery of native heart without angina pectoris     Essential hypertension 2016    GERD (gastroesophageal reflux disease)     Hyperlipidemia 2003    hypercholsterolemia    Osteoarthritis     Palpitation 2021    Ulcerative colitis ([facility] [address]        Past Surgical History:  Past Surgical History:   Procedure Laterality Date    CARDIAC CATHETERIZATION      COLONOSCOPY      2010=surveillance for ulcerative collitis    FINGER TRIGGER RELEASE      INSERTABLE CARDIAC MONITOR  2021    loop    KNEE ARTHROSCOPY      ROTATOR CUFF REPAIR      TONSILLECTOMY         Family History:  Family History   Problem Relation Age of Onset    Cancer Mother         NHL    Heart Disease Maternal Grandmother         CAD    Diabetes Maternal Grandfather     Heart Disease Paternal Grandfather         MI    Cancer Paternal Grandfather         PROSTATE        Social History:  Social History     Socioeconomic History    Marital status:      Spouse name: Etta Bowling   Occupational History    Occupation: retired   Tobacco Use    Smoking status: Former     Packs/day: 1.00     Years: 10.00     Pack years: 10.00     Types: Cigarettes     Quit date: 3/29/1988     Years since quittin.6    Smokeless tobacco: Current   Vaping Use    Vaping Use: Never used   Substance and Sexual Activity    Alcohol use: Yes     Comment: 1-2 daily    Drug use: No     Social Determinants of Health     Financial Resource Strain: Low Risk     Difficulty of Paying Living Expenses: Not hard at all   Food Insecurity: No Food Insecurity    Worried About Running Out of Food in the Last Year: Never true    Ran Out of Food in the Last Year: Never true        Allergies:  No Known Allergies     Medications:  Current Outpatient Medications   Medication Sig Dispense Refill    rivaroxaban (XARELTO) 20 MG TABS tablet Take 1 tablet by mouth daily (with breakfast) 30 tablet 1    levocetirizine (XYZAL) 5 MG tablet Take 5 mg by mouth nightly      rivaroxaban (XARELTO) 20 MG TABS tablet Take 1 tablet by mouth daily (with breakfast) 14 tablet 0    Acetaminophen (TYLENOL ARTHRITIS PAIN PO) Take by mouth      NONFORMULARY Saline spray as needed      terbinafine (LAMISIL) 250 MG tablet Take 1 tablet by mouth daily 90 tablet 0    losartan (COZAAR) 50 MG tablet Take 1 tablet by mouth daily 90 tablet 3    atorvastatin (LIPITOR) 40 MG tablet Take 1 tablet by mouth daily 90 tablet 3    CPAP Machine MISC by Does not apply route Change to CPAP pressure 9 cm H2O. Increase EPR to 3  DL in 2 weeks 1 each 0    nitroGLYCERIN (NITROSTAT) 0.4 MG SL tablet up to max of 3 total doses. If no relief after 1 dose, call 911. 25 tablet 3    multivitamin (THERAGRAN) per tablet Take 1 tablet by mouth daily. aspirin 81 MG EC tablet Take 81 mg by mouth daily. No current facility-administered medications for this visit. Physical Examination:  Vitals:    11/09/22 1502   BP: 116/65   Pulse: 70   SpO2: 96%     Body mass index is 29.92 kg/m². GENERAL: Alert and oriented. No distress. EYES: No pallor or icterus. ENT: No cyanosis. No thyromegaly or cervical LAP. VESSELS: No jugular venous distension or carotid bruits. HEART: Normal S1/S2. No murmur, rub or gallop. LUNGS: Clear to auscultation. ABDOMEN: Soft and non-tender. EXTREMITIES: No lower extremity edema.   NEUROLOGICAL: Grossly normal.     Laboratory And Diagnostic Data  I have personally reviewed and interpreted the results of the following diagnostic testing    Lab Results   Component Value Date    WBC 5.0 01/05/2022    HGB 13.9 (L) 01/05/2022    HCT 42.1 01/05/2022     01/05/2022    CHOL 127 01/05/2022    TRIG 47 01/05/2022    HDL 57 01/05/2022    ALT 38 03/16/2022    AST 25 03/16/2022     01/11/2022    K 4.5 01/05/2022     01/05/2022    CREATININE 0.8 01/05/2022    BUN 18 01/05/2022    CO2 28 01/05/2022    TSH 1.430 01/05/2022    INR 0.97 03/15/2021    GLUF 91 01/05/2022     Echocardiogram 12/22/20: LVEF 60%, LVWT 60%, LAD/STEVE 80. No significant valvular abnormalities. ECG Sinus rhythm, bifascicular block (RBBB and LPFB) and prolonged MO interval.  Coronary angiogram PCI-LAD (12/22/20). Loop interrogation Runs of regular tachycardia (atrial flutter with aberrancy) and intermittent Mobitz type II AV second-degree AV block and high grade AV block. Impression:  Atrial flutter, low burden detected on loop recorder. [id number] score 2. On rivaroxaban. Intermittent high-grade AV block, predominantly during the night. Bifascicular block. CAD/PCI-LAD (12/20). On aspirin. Off beta-blocker. HTN, HPL, overweight, MELVIN/CPAP. Chest pains. Loop recorder. Assessment And Recommendations: The patient has bradycardia arrhythmias due to significant inflammation conduction system disease. He has indication for beta-blocker including atrial flutter and coronary artery disease. I think he will benefit by a dual-chamber pacemaker implantation. He is currently concerned primarily about his chest pains this could be related to blockages in his coronary arteries. He is going to discuss with Dr. Vignesh Chen. He will get back to us if he decides to have a pacemaker. The risk and benefits of pacemaker implantation discussed with patient in details. His questions answered. **This report has been created using voice recognition software.  It may contain minor errors which are inherent in voice recognition technology. **       Lee Bull MD, MRCP, [facility], [facility] on 11/9/2022 at 4:11 PM

## 2022-11-09 NOTE — TELEPHONE ENCOUNTER
Pt saw Dr Pretty Rivas today. Will need a pacer. Pt c/o various symptoms, including SOB and chest pains with exertion. Pt and wife state similar to symptoms pt had prior to getting a previous stent several years ago. Wife states at that time pts stress test was okay, but pt ended up needing a stent shortly thereafter. Pt last saw Dr Pilo Cope 10/21/22. Pt's wife states the symptoms started after he saw Dr Pilo Cope. Please advise if additional testing needed prior to pacer insertion.

## 2022-11-11 NOTE — TELEPHONE ENCOUNTER
Spoke with patient. He states he does have shortness of breath and chest discomfort in the past 2 weeks. Cath ordered and given to scheduling.

## 2022-11-14 ENCOUNTER — TELEPHONE (OUTPATIENT)
Dept: CARDIOLOGY CLINIC | Age: 67
End: 2022-11-14

## 2022-11-14 NOTE — TELEPHONE ENCOUNTER
Assessment And Recommendations: The patient has bradycardia arrhythmias due to significant inflammation conduction system disease. He has indication for beta-blocker including atrial flutter and coronary artery disease. I think he will benefit by a dual-chamber pacemaker implantation. He is currently concerned primarily about his chest pains this could be related to blockages in his coronary arteries. He is going to discuss with Dr. Svitlana Stephens. He will get back to us if he decides to have a pacemaker. The risk and benefits of pacemaker implantation discussed with patient in details. His questions answered. Lesley Osorio MD, MRCP, [facility] on 11/9/2022 at 4:11 PM     Cath with Nallu has been ordered. Awaiting scheduling.

## 2022-11-15 NOTE — TELEPHONE ENCOUNTER
Heart cath scheduled 11-17-22 @ 8:00am  Pt informed, date, time and instructions reviewed with pt    Being Tracked

## 2022-11-16 ENCOUNTER — PRE-PROCEDURE TELEPHONE (OUTPATIENT)
Dept: INPATIENT UNIT | Age: 67
End: 2022-11-16

## 2022-11-16 ENCOUNTER — PREP FOR PROCEDURE (OUTPATIENT)
Dept: CARDIOLOGY | Age: 67
End: 2022-11-16

## 2022-11-16 RX ORDER — NITROGLYCERIN 0.4 MG/1
0.4 TABLET SUBLINGUAL EVERY 5 MIN PRN
Status: CANCELLED | OUTPATIENT
Start: 2022-11-16

## 2022-11-16 RX ORDER — SODIUM CHLORIDE 9 MG/ML
INJECTION, SOLUTION INTRAVENOUS CONTINUOUS
Status: CANCELLED | OUTPATIENT
Start: 2022-11-16

## 2022-11-16 RX ORDER — SODIUM CHLORIDE 0.9 % (FLUSH) 0.9 %
5-40 SYRINGE (ML) INJECTION PRN
Status: CANCELLED | OUTPATIENT
Start: 2022-11-16

## 2022-11-16 RX ORDER — SODIUM CHLORIDE 9 MG/ML
INJECTION, SOLUTION INTRAVENOUS PRN
Status: CANCELLED | OUTPATIENT
Start: 2022-11-16

## 2022-11-16 RX ORDER — SODIUM CHLORIDE 0.9 % (FLUSH) 0.9 %
5-40 SYRINGE (ML) INJECTION EVERY 12 HOURS SCHEDULED
Status: CANCELLED | OUTPATIENT
Start: 2022-11-16

## 2022-11-16 RX ORDER — ASPIRIN 325 MG
325 TABLET ORAL ONCE
Status: CANCELLED | OUTPATIENT
Start: 2022-11-16 | End: 2022-11-16

## 2022-11-17 ENCOUNTER — HOSPITAL ENCOUNTER (OUTPATIENT)
Dept: INPATIENT UNIT | Age: 67
Discharge: HOME | End: 2022-11-17
Attending: INTERNAL MEDICINE | Admitting: INTERNAL MEDICINE
Payer: MEDICARE

## 2022-11-17 VITALS
HEART RATE: 69 BPM | HEIGHT: 66 IN | WEIGHT: 185 LBS | RESPIRATION RATE: 17 BRPM | BODY MASS INDEX: 29.73 KG/M2 | TEMPERATURE: 97.7 F | DIASTOLIC BLOOD PRESSURE: 64 MMHG | OXYGEN SATURATION: 97 % | SYSTOLIC BLOOD PRESSURE: 118 MMHG

## 2022-11-17 PROBLEM — I20.89 ANGINAL EQUIVALENT: Status: ACTIVE | Noted: 2022-11-17

## 2022-11-17 PROBLEM — I20.8 ANGINAL EQUIVALENT (HCC): Status: ACTIVE | Noted: 2022-11-17

## 2022-11-17 LAB
ABO: NORMAL
ANION GAP SERPL CALCULATED.3IONS-SCNC: 11 MEQ/L (ref 8–16)
ANTIBODY SCREEN: NORMAL
APTT: 26.7 SECONDS (ref 22–38)
BUN BLDV-MCNC: 18 MG/DL (ref 7–22)
CALCIUM SERPL-MCNC: 9.8 MG/DL (ref 8.5–10.5)
CHLORIDE BLD-SCNC: 104 MEQ/L (ref 98–111)
CHOLESTEROL, TOTAL: 128 MG/DL (ref 100–199)
CO2: 26 MEQ/L (ref 23–33)
CREAT SERPL-MCNC: 0.8 MG/DL (ref 0.4–1.2)
EKG ATRIAL RATE: 56 BPM
EKG P AXIS: 6 DEGREES
EKG P-R INTERVAL: 292 MS
EKG Q-T INTERVAL: 450 MS
EKG QRS DURATION: 136 MS
EKG QTC CALCULATION (BAZETT): 434 MS
EKG R AXIS: 46 DEGREES
EKG T AXIS: 18 DEGREES
EKG VENTRICULAR RATE: 56 BPM
ERYTHROCYTE [DISTWIDTH] IN BLOOD BY AUTOMATED COUNT: 11.9 % (ref 11.5–14.5)
ERYTHROCYTE [DISTWIDTH] IN BLOOD BY AUTOMATED COUNT: 40.8 FL (ref 35–45)
GFR SERPL CREATININE-BSD FRML MDRD: > 60 ML/MIN/1.73M2
GLUCOSE BLD-MCNC: 94 MG/DL (ref 70–108)
HCT VFR BLD CALC: 39.6 % (ref 42–52)
HDLC SERPL-MCNC: 57 MG/DL
HEMOGLOBIN: 13.5 GM/DL (ref 14–18)
INR BLD: 0.97 (ref 0.85–1.13)
LDL CHOLESTEROL CALCULATED: 63 MG/DL
MCH RBC QN AUTO: 31.8 PG (ref 26–33)
MCHC RBC AUTO-ENTMCNC: 34.1 GM/DL (ref 32.2–35.5)
MCV RBC AUTO: 93.4 FL (ref 80–94)
PLATELET # BLD: 192 THOU/MM3 (ref 130–400)
PMV BLD AUTO: 8.7 FL (ref 9.4–12.4)
POTASSIUM SERPL-SCNC: 4.8 MEQ/L (ref 3.5–5.2)
RBC # BLD: 4.24 MILL/MM3 (ref 4.7–6.1)
RH FACTOR: NORMAL
SODIUM BLD-SCNC: 141 MEQ/L (ref 135–145)
TRIGL SERPL-MCNC: 42 MG/DL (ref 0–199)
WBC # BLD: 4.1 THOU/MM3 (ref 4.8–10.8)

## 2022-11-17 PROCEDURE — 6360000004 HC RX CONTRAST MEDICATION: Performed by: INTERNAL MEDICINE

## 2022-11-17 PROCEDURE — 93458 L HRT ARTERY/VENTRICLE ANGIO: CPT | Performed by: INTERNAL MEDICINE

## 2022-11-17 PROCEDURE — 85027 COMPLETE CBC AUTOMATED: CPT

## 2022-11-17 PROCEDURE — 85610 PROTHROMBIN TIME: CPT

## 2022-11-17 PROCEDURE — 36415 COLL VENOUS BLD VENIPUNCTURE: CPT

## 2022-11-17 PROCEDURE — 2500000003 HC RX 250 WO HCPCS

## 2022-11-17 PROCEDURE — 6360000002 HC RX W HCPCS

## 2022-11-17 PROCEDURE — C1769 GUIDE WIRE: HCPCS

## 2022-11-17 PROCEDURE — 93005 ELECTROCARDIOGRAM TRACING: CPT | Performed by: PHYSICIAN ASSISTANT

## 2022-11-17 PROCEDURE — 86850 RBC ANTIBODY SCREEN: CPT

## 2022-11-17 PROCEDURE — 80061 LIPID PANEL: CPT

## 2022-11-17 PROCEDURE — 93458 L HRT ARTERY/VENTRICLE ANGIO: CPT

## 2022-11-17 PROCEDURE — 80048 BASIC METABOLIC PNL TOTAL CA: CPT

## 2022-11-17 PROCEDURE — 86900 BLOOD TYPING SEROLOGIC ABO: CPT

## 2022-11-17 PROCEDURE — 85730 THROMBOPLASTIN TIME PARTIAL: CPT

## 2022-11-17 PROCEDURE — 93010 ELECTROCARDIOGRAM REPORT: CPT | Performed by: INTERNAL MEDICINE

## 2022-11-17 PROCEDURE — 86901 BLOOD TYPING SEROLOGIC RH(D): CPT

## 2022-11-17 PROCEDURE — C1894 INTRO/SHEATH, NON-LASER: HCPCS

## 2022-11-17 PROCEDURE — 2580000003 HC RX 258: Performed by: PHYSICIAN ASSISTANT

## 2022-11-17 RX ORDER — ACETAMINOPHEN 325 MG/1
650 TABLET ORAL EVERY 4 HOURS PRN
Status: DISCONTINUED | OUTPATIENT
Start: 2022-11-17 | End: 2022-11-17 | Stop reason: HOSPADM

## 2022-11-17 RX ORDER — SODIUM CHLORIDE 0.9 % (FLUSH) 0.9 %
5-40 SYRINGE (ML) INJECTION EVERY 12 HOURS SCHEDULED
Status: DISCONTINUED | OUTPATIENT
Start: 2022-11-17 | End: 2022-11-17 | Stop reason: HOSPADM

## 2022-11-17 RX ORDER — SODIUM CHLORIDE 9 MG/ML
INJECTION, SOLUTION INTRAVENOUS PRN
Status: DISCONTINUED | OUTPATIENT
Start: 2022-11-17 | End: 2022-11-17 | Stop reason: HOSPADM

## 2022-11-17 RX ORDER — SODIUM CHLORIDE 0.9 % (FLUSH) 0.9 %
5-40 SYRINGE (ML) INJECTION PRN
Status: DISCONTINUED | OUTPATIENT
Start: 2022-11-17 | End: 2022-11-17 | Stop reason: HOSPADM

## 2022-11-17 RX ORDER — SODIUM CHLORIDE 9 MG/ML
INJECTION, SOLUTION INTRAVENOUS CONTINUOUS
Status: DISCONTINUED | OUTPATIENT
Start: 2022-11-17 | End: 2022-11-17 | Stop reason: HOSPADM

## 2022-11-17 RX ORDER — ASPIRIN 325 MG
325 TABLET ORAL ONCE
Status: DISCONTINUED | OUTPATIENT
Start: 2022-11-17 | End: 2022-11-17 | Stop reason: HOSPADM

## 2022-11-17 RX ORDER — NITROGLYCERIN 0.4 MG/1
0.4 TABLET SUBLINGUAL EVERY 5 MIN PRN
Status: DISCONTINUED | OUTPATIENT
Start: 2022-11-17 | End: 2022-11-17 | Stop reason: HOSPADM

## 2022-11-17 RX ADMIN — SODIUM CHLORIDE: 9 INJECTION, SOLUTION INTRAVENOUS at 06:38

## 2022-11-17 RX ADMIN — IOPAMIDOL 50 ML: 755 INJECTION, SOLUTION INTRAVENOUS at 08:41

## 2022-11-17 ASSESSMENT — LIFESTYLE VARIABLES
HOW MANY STANDARD DRINKS CONTAINING ALCOHOL DO YOU HAVE ON A TYPICAL DAY: 1 OR 2
HOW OFTEN DO YOU HAVE A DRINK CONTAINING ALCOHOL: 4 OR MORE TIMES A WEEK

## 2022-11-17 NOTE — PROGRESS NOTES
Returned to 2E17. Monitor attached showing SB with 1 degree block. Vasc-band in place to right wrist.  Hemostasis maintained. 0.9NSS infusing with approx 500 ml remaining.

## 2022-11-17 NOTE — BRIEF OP NOTE
[facility]  S[facility]/Analgesia Post Sedation Record        Pt Name: Qian Crane  MRN: [id number]  YOB: 1955  Procedure Performed By: Akash Joy MD MD, [address]  Primary Care Physician: Benjamin Simmons MD    POST-PROCEDURE    Sedation/Anesthesia:  Local Anesthesia and IV Conscious Sedation with continuous O2 monitoring    Estimated Blood Loss: 10 cc     Specimens Removed:  [x]None []Other:      Disposition of Specimen:  []Pathology []Other        Complications:   [x]None Immediate []Other:       Procedure performed: Left heart cath    Post Procedure Diagnosis/Findings:  Non-obstructive Coronary Artery Disease        Recommendations:    Medical treatment  Routine post-cath care.                Akash Joy MD MD, [address]  Electronically signed 11/17/2022 at 9:11 AM

## 2022-11-17 NOTE — H&P
[address] [address]  Sedation/Analgesia History & Physical    Pt Name: Casandra Dean  Account number: [de-identified]  MRN: [id number]  YOB: 1955  Provider Performing Procedure: Ottoniel Alex MD MD [facility]  Primary Care Physician: Jakub Giles MD  Date: 11/17/2022    PRE-PROCEDURE    Code Status: FULL CODE  Brief History/Pre-Procedure Diagnosis: Angina III    Consent: : I have discussed with the patient risks, benefits, and alternatives (and relevant risks, benefits, and side effects related to alternatives or not receiving care), and likelihood of the success. The patient and/or representative appear to understand and agree to proceed. The discussion encompasses risks, benefits, and side effects related to the alternatives and the risks related to not receiving the proposed care, treatment, and services. PLANNED PROCEDURE   [x]Cath  [x]PCI                []Pacemaker/AICD  []CAMELIA             []Cardioversion []Peripheral angiography/PTA  []Other:      VITAL SIGNS   Vitals:    11/17/22 0623   BP:    Pulse: 55   Resp:    Temp:    SpO2:        PHYSICAL:   General: No acute distress  HEENT:  Unremarkable for age  Neck: without increased JVD, carotid pulses 2+ bilaterally without bruits  Heart: RRR, S1 & S2 WNL   Lungs: Clear to auscultation    Abdomen: BS present, without HSM, masses, or tenderness    Extremities: without C,C,E.  Pulses 2+ bilaterally  Mental Status: Alert & Oriented    SEDATION  Planned agent:[x]Midazolam []Meperidine []Sublimaze []Morphine  []Diazepam  [x]Other: fentanyl      ASA Classification:  []1 []2 [x]3 []4 []5  Class 1: A normal healthy patient  Class 2: Pt with mild to moderate systemic disease  Class 3: Severe systemic disease or disturbance  Class 4: Severe systemic disorders that are already life threatening. Class 5: Moribund pt with little chances of survival, for more than 24 hours.   Mallampati I Airway Classification:   []1 []2 [x]3 []4          MEDICAL HISTORY   has a past medical history of Atrial flutter ([facility] [address] Calcium oxalate renal stones, Coronary artery disease involving native coronary artery of native heart without angina pectoris, Essential hypertension, GERD (gastroesophageal reflux disease), Hyperlipidemia, Osteoarthritis, Palpitation, and Ulcerative colitis ([facility] [address] []Additional information:          SURGICAL HISTORY   has a past surgical history that includes Colonoscopy; Cardiac catheterization; Rotator cuff repair; Knee arthroscopy; Finger trigger release; Tonsillectomy; and Insertable Cardiac Monitor (01/2021). Additional information:       ALLERGIES   Allergies as of 11/17/2022    (No Known Allergies)     Additional information:       MEDICATIONS     Current Facility-Administered Medications:     0.9 % sodium chloride infusion, , IntraVENous, Continuous, Teetee Hill PA-C, Last Rate: 75 mL/hr at 11/17/22 5768, New Bag at 11/17/22 5843    sodium chloride flush 0.9 % injection 5-40 mL, 5-40 mL, IntraVENous, 2 times per day, Teetee Hill PA-C    sodium chloride flush 0.9 % injection 5-40 mL, 5-40 mL, IntraVENous, PRN, Teetee Hill PA-C    0.9 % sodium chloride infusion, , IntraVENous, PRN, Teetee Hill PA-C    aspirin tablet 325 mg, 325 mg, Oral, Once, Teetee Hill PA-C    nitroGLYCERIN (NITROSTAT) SL tablet 0.4 mg, 0.4 mg, SubLINGual, Q5 Min PRN, Teetee Hill PA-C  Prior to Admission medications    Medication Sig Start Date End Date Taking?  Authorizing Provider   rivaroxaban (XARELTO) 20 MG TABS tablet Take 1 tablet by mouth daily (with breakfast) 10/26/22   Emily Araiza MD   levocetirizine (XYZAL) 5 MG tablet Take 5 mg by mouth nightly    Historical Provider, MD   Acetaminophen (TYLENOL ARTHRITIS PAIN PO) Take by mouth    Historical Provider, MD   NONFORMULARY Saline spray as needed    Historical Provider, MD   losartan (COZAAR) 50 MG tablet Take 1 tablet by mouth daily 1/11/22   Cori Bateman MD   atorvastatin (LIPITOR) 40 MG tablet Take 1 tablet by mouth daily 1/11/22   Sundeep Guadalupe MD   CPAP Machine MISC by Does not apply route Change to CPAP pressure 9 cm H2O. Increase EPR to 3  DL in 2 weeks 8/11/21   SAI Franklin - CNP   nitroGLYCERIN (NITROSTAT) 0.4 MG SL tablet up to max of 3 total doses. If no relief after 1 dose, call 911. 12/22/20   Terri Paez MD   multivitamin [facility]) per tablet Take 1 tablet by mouth daily. Historical Provider, MD   aspirin 81 MG EC tablet Take 81 mg by mouth daily.       Historical Provider, MD     Additional information:                 Terri Paez MD MD [facility]  Electronically signed 11/17/2022 at 7:59 AM

## 2022-11-17 NOTE — PROGRESS NOTES
Patient admitted to 2E17  Ambulatory for cardiac cath. Patient NPO. Patient accompanied by family. Vital signs obtained. Assessment and data collection intiated. Oriented to room. Policies and procedures for 2E explained. All questions answered with no further questions at this time. Fall prevention and safety precautions discussed with patient.

## 2022-11-17 NOTE — PROCEDURES
[address] [place] [address]                            CARDIAC CATHETERIZATION    PATIENT NAME: Bacilio Marmolejo                   :        1955  MED REC NO:   [id number]                           ROOM:       0017  ACCOUNT NO:   [de-identified]                           ADMIT DATE: 2022  PROVIDER:     Gibson Vasquez MD    DATE OF PROCEDURE:  2022    PROCEDURES PERFORMED:  Coronary angiogram, LV gram.    INDICATION FOR PROCEDURE:  Angina equivalent symptoms. DESCRIPTION OF PROCEDURE:  After written informed consent was obtained,  the patient was brought to the cardiac catheterization laboratory in a  fasting and nonsedated state. Preprocedure timeout was performed. 2%  lidocaine was used to anesthetize the subcutaneous tissue overlying the  right radial artery. Using a modifier Seldinger technique, a 6-Togolese  Slender arterial sheath was placed in the right radial artery. Once the  sheath was in place, radial cocktail was given to reduce radial artery  spasm and occlusion. EQUIPMENTS USED:  Standard 5-Togolese JR4, JL3.5 diagnostic catheters. ESTIMATED BLOOD LOSS:  Less than _____ mL. MEDICATION:  Please see EMR. CORONARY ANGIOGRAM:  1. Right coronary artery appears to be a dominant vessel. It is  overall patent with mild luminal irregularities in the proximal, mid and  distal portions of the vessel. 2.  Left main is overall patent, gives rise to LAD and left circumflex  arteries. 3.  Left circumflex artery is small in caliber as a 30% ostial disease  followed by mild luminal irregularities in the mid to distal portion of  the vessel. 4. LAD had a patent prior intervention site that crossed all segments. There is a large branch diagonal that is overall patent. The LAD is  patent at the proximal, mid and distal portions of the vessel.   Of note,  the proximal portion of the LAD is stented and it is jailing a diagonal  branch that appears to have a 70% ostial stenosis. 5.  LVEDP was measured to be 10 mmHg. There is no significant gradient  across the aortic valve and LV systolic function was roughly estimated  at 50%. The patient tolerated the procedure well. There were no immediate  complications. Hemostasis of the right radial artery was achieved with  a Vasc Band device and he was transferred to room in stable condition. EBL ~20 cc    SUMMARY:  1. Nonobstructive coronary artery disease with patent stent in the  proximal LAD. 2.  LVEDP was measured to be 10 mmHg. 3.  LV systolic function was estimated at 50%. RECOMMENDATIONS:  1. Continue DAPT. 2.  Aggressive risk factor modification. 3.  Optimize antianginal therapy. 4.  If symptoms persist, could consider PCI of jailed diagonal branch  but would first try medical therapy only. 5.  Proceed with evaluation for pacemaker by Electrophysiology. 6.  Monitor radial artery access site for bleeding/hematoma. 7.  IV fluids. 8.  Routine post cath care. All procedure details and management plans were discussed in detail with  the patient and family members and they were in agreement with the plan.         Brenda Drake MD    D: 11/17/2022 9:20:34       T: 11/17/2022 10:24:59     [id number]  Job#: [id number]     Doc#: [id number]    CC:

## 2022-11-18 ENCOUNTER — TELEPHONE (OUTPATIENT)
Dept: CARDIOLOGY CLINIC | Age: 67
End: 2022-11-18

## 2022-11-18 DIAGNOSIS — I48.92 ATRIAL FLUTTER, PAROXYSMAL (HCC): Primary | ICD-10-CM

## 2022-11-18 DIAGNOSIS — R00.1 BRADYCARDIA: ICD-10-CM

## 2022-11-18 NOTE — TELEPHONE ENCOUNTER
Assessment And Recommendations: The patient has bradycardia arrhythmias due to significant inflammation conduction system disease. He has indication for beta-blocker including atrial flutter and coronary artery disease. I think he will benefit by a dual-chamber pacemaker implantation. He is currently concerned primarily about his chest pains this could be related to blockages in his coronary arteries. He is going to discuss with Dr. Serge Gonzalez. He will get back to us if he decides to have a pacemaker. The risk and benefits of pacemaker implantation discussed with patient in details. His questions answered. German Topete MD, MRCP, Lamon Fabry on 11/9/2022 at 4:11 PM      Please see cath results-  Jovanny Jimenez to move forward with Dual pacemaker? Hold Xarelto 1 day? SUMMARY:  1. Nonobstructive coronary artery disease with patent stent in the  proximal LAD. 2.  LVEDP was measured to be 10 mmHg. 3.  LV systolic function was estimated at 50%. RECOMMENDATIONS:  1. Continue DAPT. 2.  Aggressive risk factor modification. 3.  Optimize antianginal therapy. 4.  If symptoms persist, could consider PCI of jailed diagonal branch  but would first try medical therapy only. 5.  Proceed with evaluation for pacemaker by Electrophysiology. 6.  Monitor radial artery access site for bleeding/hematoma. 7.  IV fluids. 8.  Routine post cath care. All procedure details and management plans were discussed in detail with  the patient and family members and they were in agreement with the plan.            Nakia Jones MD     D: 11/17/2022 9:20:34       T: 11/17/2022 10:24:59     [id number]  Job#: [id number]     Doc#: [id number]

## 2022-11-18 NOTE — TELEPHONE ENCOUNTER
Procedure: dual chamber pacemaker  Date: 11.25.2022  Arrival Time: 800am   Meds to Hold: Xarelto 1 day prior      Instructions verbalized to the patient. Instructions emailed to the patient. [email or web address]

## 2022-11-25 ENCOUNTER — APPOINTMENT (OUTPATIENT)
Dept: GENERAL RADIOLOGY | Age: 67
End: 2022-11-25
Attending: INTERNAL MEDICINE
Payer: MEDICARE

## 2022-11-25 ENCOUNTER — HOSPITAL ENCOUNTER (OUTPATIENT)
Dept: INPATIENT UNIT | Age: 67
Discharge: HOME | End: 2022-11-25
Attending: INTERNAL MEDICINE | Admitting: INTERNAL MEDICINE
Payer: MEDICARE

## 2022-11-25 VITALS
HEIGHT: 66 IN | TEMPERATURE: 97.6 F | OXYGEN SATURATION: 98 % | DIASTOLIC BLOOD PRESSURE: 58 MMHG | SYSTOLIC BLOOD PRESSURE: 103 MMHG | BODY MASS INDEX: 29.73 KG/M2 | WEIGHT: 185 LBS | HEART RATE: 65 BPM | RESPIRATION RATE: 18 BRPM

## 2022-11-25 PROBLEM — I44.30 AV BLOCK: Status: ACTIVE | Noted: 2022-11-25

## 2022-11-25 PROBLEM — R00.1 SINUS BRADYCARDIA: Status: ACTIVE | Noted: 2022-11-25

## 2022-11-25 PROBLEM — Z95.0 PACEMAKER: Status: ACTIVE | Noted: 2022-11-25

## 2022-11-25 LAB
ABO: NORMAL
ALBUMIN SERPL-MCNC: 4.2 G/DL (ref 3.5–5.1)
ALP BLD-CCNC: 69 U/L (ref 38–126)
ALT SERPL-CCNC: 28 U/L (ref 11–66)
ANION GAP SERPL CALCULATED.3IONS-SCNC: 11 MEQ/L (ref 8–16)
ANTIBODY SCREEN: NORMAL
AST SERPL-CCNC: 26 U/L (ref 5–40)
BILIRUB SERPL-MCNC: 0.9 MG/DL (ref 0.3–1.2)
BUN BLDV-MCNC: 18 MG/DL (ref 7–22)
CALCIUM SERPL-MCNC: 9.8 MG/DL (ref 8.5–10.5)
CHLORIDE BLD-SCNC: 102 MEQ/L (ref 98–111)
CO2: 28 MEQ/L (ref 23–33)
CREAT SERPL-MCNC: 0.8 MG/DL (ref 0.4–1.2)
ERYTHROCYTE [DISTWIDTH] IN BLOOD BY AUTOMATED COUNT: 11.6 % (ref 11.5–14.5)
ERYTHROCYTE [DISTWIDTH] IN BLOOD BY AUTOMATED COUNT: 39.7 FL (ref 35–45)
GFR SERPL CREATININE-BSD FRML MDRD: > 60 ML/MIN/1.73M2
GLUCOSE BLD-MCNC: 95 MG/DL (ref 70–108)
HCT VFR BLD CALC: 39.5 % (ref 42–52)
HEMOGLOBIN: 13.5 GM/DL (ref 14–18)
INR BLD: 1.01 (ref 0.85–1.13)
MCH RBC QN AUTO: 31.9 PG (ref 26–33)
MCHC RBC AUTO-ENTMCNC: 34.2 GM/DL (ref 32.2–35.5)
MCV RBC AUTO: 93.4 FL (ref 80–94)
PLATELET # BLD: 196 THOU/MM3 (ref 130–400)
PMV BLD AUTO: 8.6 FL (ref 9.4–12.4)
POTASSIUM SERPL-SCNC: 4.5 MEQ/L (ref 3.5–5.2)
RBC # BLD: 4.23 MILL/MM3 (ref 4.7–6.1)
RH FACTOR: NORMAL
SODIUM BLD-SCNC: 141 MEQ/L (ref 135–145)
TOTAL PROTEIN: 6.6 G/DL (ref 6.1–8)
WBC # BLD: 4.4 THOU/MM3 (ref 4.8–10.8)

## 2022-11-25 PROCEDURE — 36415 COLL VENOUS BLD VENIPUNCTURE: CPT

## 2022-11-25 PROCEDURE — 86900 BLOOD TYPING SEROLOGIC ABO: CPT

## 2022-11-25 PROCEDURE — 85610 PROTHROMBIN TIME: CPT

## 2022-11-25 PROCEDURE — 86901 BLOOD TYPING SEROLOGIC RH(D): CPT

## 2022-11-25 PROCEDURE — C1785 PMKR, DUAL, RATE-RESP: HCPCS

## 2022-11-25 PROCEDURE — 6360000002 HC RX W HCPCS

## 2022-11-25 PROCEDURE — 33208 INSRT HEART PM ATRIAL & VENT: CPT

## 2022-11-25 PROCEDURE — 93005 ELECTROCARDIOGRAM TRACING: CPT | Performed by: INTERNAL MEDICINE

## 2022-11-25 PROCEDURE — C1898 LEAD, PMKR, OTHER THAN TRANS: HCPCS

## 2022-11-25 PROCEDURE — 80053 COMPREHEN METABOLIC PANEL: CPT

## 2022-11-25 PROCEDURE — 33286 RMVL SUBQ CAR RHYTHM MNTR: CPT

## 2022-11-25 PROCEDURE — C1894 INTRO/SHEATH, NON-LASER: HCPCS

## 2022-11-25 PROCEDURE — 2580000003 HC RX 258: Performed by: INTERNAL MEDICINE

## 2022-11-25 PROCEDURE — 6360000002 HC RX W HCPCS: Performed by: INTERNAL MEDICINE

## 2022-11-25 PROCEDURE — 71046 X-RAY EXAM CHEST 2 VIEWS: CPT

## 2022-11-25 PROCEDURE — 86850 RBC ANTIBODY SCREEN: CPT

## 2022-11-25 PROCEDURE — 33208 INSRT HEART PM ATRIAL & VENT: CPT | Performed by: INTERNAL MEDICINE

## 2022-11-25 PROCEDURE — 85027 COMPLETE CBC AUTOMATED: CPT

## 2022-11-25 PROCEDURE — 6370000000 HC RX 637 (ALT 250 FOR IP): Performed by: INTERNAL MEDICINE

## 2022-11-25 PROCEDURE — 33286 RMVL SUBQ CAR RHYTHM MNTR: CPT | Performed by: INTERNAL MEDICINE

## 2022-11-25 RX ORDER — SODIUM CHLORIDE 9 MG/ML
INJECTION, SOLUTION INTRAVENOUS CONTINUOUS
Status: DISCONTINUED | OUTPATIENT
Start: 2022-11-25 | End: 2022-11-25 | Stop reason: HOSPADM

## 2022-11-25 RX ORDER — SODIUM CHLORIDE 9 MG/ML
INJECTION, SOLUTION INTRAVENOUS PRN
Status: DISCONTINUED | OUTPATIENT
Start: 2022-11-25 | End: 2022-11-25 | Stop reason: HOSPADM

## 2022-11-25 RX ORDER — SODIUM CHLORIDE 0.9 % (FLUSH) 0.9 %
5-40 SYRINGE (ML) INJECTION EVERY 12 HOURS SCHEDULED
Status: DISCONTINUED | OUTPATIENT
Start: 2022-11-25 | End: 2022-11-25 | Stop reason: HOSPADM

## 2022-11-25 RX ORDER — SODIUM CHLORIDE 0.9 % (FLUSH) 0.9 %
5-40 SYRINGE (ML) INJECTION PRN
Status: DISCONTINUED | OUTPATIENT
Start: 2022-11-25 | End: 2022-11-25 | Stop reason: HOSPADM

## 2022-11-25 RX ORDER — ACETAMINOPHEN 325 MG/1
650 TABLET ORAL EVERY 4 HOURS PRN
Status: DISCONTINUED | OUTPATIENT
Start: 2022-11-25 | End: 2022-11-25 | Stop reason: HOSPADM

## 2022-11-25 RX ADMIN — SODIUM CHLORIDE: 9 INJECTION, SOLUTION INTRAVENOUS at 09:03

## 2022-11-25 RX ADMIN — ACETAMINOPHEN 650 MG: 325 TABLET ORAL at 16:12

## 2022-11-25 RX ADMIN — CEFAZOLIN 2000 MG: 10 INJECTION, POWDER, FOR SOLUTION INTRAVENOUS at 12:59

## 2022-11-25 ASSESSMENT — PAIN DESCRIPTION - DESCRIPTORS: DESCRIPTORS: ACHING

## 2022-11-25 ASSESSMENT — PAIN DESCRIPTION - ORIENTATION: ORIENTATION: LEFT;UPPER;INNER

## 2022-11-25 ASSESSMENT — PAIN SCALES - GENERAL: PAINLEVEL_OUTOF10: 6

## 2022-11-25 ASSESSMENT — PAIN DESCRIPTION - LOCATION: LOCATION: ARM

## 2022-11-25 NOTE — FLOWSHEET NOTE
Patient admitted to 2E07  Ambulatory for pacemaker implant. Patient NPO. Patient accompanied by his wife. Vital signs obtained. Assessment and data collection intiated. Oriented to room. Policies and procedures for  explained. All questions answered with no further questions at this time. Fall prevention and safety precautions discussed with patient. Explained patients right to have family, representative or physician notified of their admission. Patient has Declined for physician to be notified. Patient has Declined for family/representative to be notified.

## 2022-11-25 NOTE — PLAN OF CARE
Problem: Discharge Planning  Goal: Discharge to home or other facility with appropriate resources  Outcome: Progressing  Flowsheets (Taken 11/25/2022 1839)  Discharge to home or other facility with appropriate resources: Identify barriers to discharge with patient and caregiver   Pt to have pacemaker placed  Problem: ABCDS Injury Assessment  Goal: Absence of physical injury  Outcome: Progressing

## 2022-11-25 NOTE — H&P
[facility] ()  [address] [address] [address]  H&P and SEDATION/ANALGESIA     Patient demographics:  Date:   11/25/2022  Patient name: Tommy Hernandez  YOB: 1955  Sex: male   MRN:   [id number]    Reason for admission or planned procedure:  Dual Chamber pacemaker implantation    Code Status: Full Code    Consent:The risk and benefits of pacemaker implantation including pneumothorax, hemothorax, bleeding, vascular complications, infection, pericardial tamponade requiring emergency pericardiocentesis, MI, death, exposure to radiation, lead dislodgement requiring reposition, future operations for generator change or device revision or upgrade were discussed with the patient. He verbalized understanding of the discussion. His questions were answered. The patient wishes to proceed. Brief clinical summary:  67/M with intermittent high grade AV block, underling bifascicular AV block and indication for beta-blocker (CAD atrial atrial flutter) electively presents for a pacemaker implantation. Normal LVEF. Medical Hx: Atrial flutter and Mobitz type II AV second-degree AV block and high grade AV block (ILR 10/2022), baseline bifascicular block, ILR implanted for bradycardia ( 3/15/21), HTN, HPL, CAD/PCI-LAD (12/20), , overweight, MELVIN/CPAP, GERD, ulcerative colitis and calcium oxalate nephrolithiasis.      Past Medical History:  Past Medical History:   Diagnosis Date    Atrial flutter ([address]     Calcium oxalate renal stones 2004    he has undergone ECSW lithotripsy    Coronary artery disease involving native coronary artery of native heart without angina pectoris     Essential hypertension 04/28/2016    GERD (gastroesophageal reflux disease)     Hyperlipidemia 03/2003    hypercholsterolemia    Osteoarthritis     Palpitation 04/20/2021    Ulcerative colitis [facility])        Past Surgical History:  Past Surgical History:   Procedure Laterality Date    CARDIAC CATHETERIZATION      COLONOSCOPY      4/2010=surveillance for ulcerative collitis    FINGER TRIGGER RELEASE      INSERTABLE CARDIAC MONITOR  01/2021    loop    KNEE ARTHROSCOPY      PTCA  12/2020    ROTATOR CUFF REPAIR      TONSILLECTOMY          Allergies: Allergies as of 11/25/2022    (No Known Allergies)     Additional information:       Medications     Current Facility-Administered Medications:     sodium chloride flush 0.9 % injection 5-40 mL, 5-40 mL, IntraVENous, 2 times per day, Merissa Stahl MD    sodium chloride flush 0.9 % injection 5-40 mL, 5-40 mL, IntraVENous, PRN, Merissa Stahl MD    0.9 % sodium chloride infusion, , IntraVENous, PRN, Merissa Stahl MD    0.9 % sodium chloride infusion, , IntraVENous, Continuous, Merissa Stahl MD, Last Rate: 75 mL/hr at 11/25/22 0903, New Bag at 11/25/22 0903    ceFAZolin (ANCEF) 2000 mg in dextrose 5 % 50 mL IVPB, 2,000 mg, IntraVENous, Once, Merissa Stahl MD    vancomycin (VANCOCIN) 250 mg in sodium chloride 0.9 % 250 mL irrigation, 250 mg, Irrigation, Once, Merissa Stahl MD  Prior to Admission medications    Medication Sig Start Date End Date Taking? Authorizing Provider   rivaroxaban (XARELTO) 20 MG TABS tablet Take 1 tablet by mouth daily (with breakfast) 10/26/22   Terri Paez MD   levocetirizine (XYZAL) 5 MG tablet Take 5 mg by mouth nightly    Historical Provider, MD   Acetaminophen (TYLENOL ARTHRITIS PAIN PO) Take by mouth    Historical Provider, MD   NONFORMULARY Saline spray as needed    Historical Provider, MD   losartan (COZAAR) 50 MG tablet Take 1 tablet by mouth daily 1/11/22   Sundeep Guadalupe MD   atorvastatin (LIPITOR) 40 MG tablet Take 1 tablet by mouth daily 1/11/22   Sundeep Guadalupe MD   CPAP Machine MISC by Does not apply route Change to CPAP pressure 9 cm H2O. Increase EPR to 3  DL in 2 weeks 8/11/21   SAI Franklin - CNP   nitroGLYCERIN (NITROSTAT) 0.4 MG SL tablet up to max of 3 total doses.  If no relief after 1 dose, call 911. Patient not taking: Reported on 11/25/2022 12/22/20   Sylwia Kapadia MD   multivitamin [facility]) per tablet Take 1 tablet by mouth daily. Historical Provider, MD   aspirin 81 MG EC tablet Take 81 mg by mouth daily. Historical Provider, MD     Aspirin  [x] 81 mg  [] 325 mg  [] None  Antiplatelet drug therapy use last 5 days  [x]No []Yes  Coumadin Use Last 5 Days [x]No []Yes  Other anticoagulant use last 5 days  []No [x]Yes  Additional information: Per medical records       Vitals:   Vitals:    11/25/22 0825   BP: 118/68   Pulse: 66   Resp: 13   Temp: 98 °F (36.7 °C)   SpO2: 100%       Physical Examination:   GENERAL: Alert and oriented. No distress. EYES: No pallor or icterus. ENT: No central cyanosis. VESSELS: No jugular venous distension or carotid bruits. HEART: Normal S1/S2. No murmur, rub or gallop. LUNGS: Clear to auscultation. ABDOMEN: Soft and non-tender. EXTREMITIES: No lower extremity edema. Feet are warm. NEUROLOGICAL: Grossly intact. Procedure Plannd:   [] AF ablation [] Atrial Flutter ablation [] SVT ablation [] PVC/VT ablation [] EP study  [x] Pacemaker implantation [] AICD implantation [] BiV PM/ICD implantation   [] Generator change [] Loop recorder implantation [] Loop recorder explantation. [] Micra leadless pacemaker implantation. [] His bundle/Left bundle pacemaker implantation. [] Lead revision. [] Pocket Revision. [] CAMELIA. [] Cardioversion    []Other:       Planned Sedation/Analgesia:  [] General anesthesia. [] Midazolam [] Fentanyl [] Propofol [] Propofol with anesthesia team.    []Midazolam []Meperidine []Sublimaze []Morphine [] Diazepam    []Other:       ASA Classification  []1 []2 [x]3 []4 []5  Class 1: A normal healthy patient  Class 2: Pt with mild to moderate systemic disease  Class 3: Severe systemic disease or disturbance  Class 4: Severe systemic disorders that are already life threatening.   Class 5: Moribund pt with little chances of survival, for more than 24 hours. Mallampati Airway Classification:   []1 []2 [x]3 []4    [x]Pre-procedure diagnostic studies complete and results available. Comment:    [x]Previous sedation/anesthesia experiences assessed. Comment:    [x]The patient is an appropriate candidate to undergo the planned procedure sedation and anesthesia. (Refer to nursing sedation/analgesia documentation record)  [x]Formulation and discussion of sedation/procedure plan, risks, and expectations with patient and/or responsible adult completed. [x]Patient examined immediately prior to the procedure.  (Refer to nursing sedation/analgesia documentation record)        Lee Bull MD MD [facility]  Electronically signed 11/25/2022 at 9:43 AM

## 2022-11-25 NOTE — PLAN OF CARE
Problem: Discharge Planning  Goal: Discharge to home or other facility with appropriate resources  11/25/2022 1532 by Irina Stuart RN  Outcome: Adequate for Discharge  11/25/2022 2154 by Irina Stuart RN  Outcome: Progressing  Flowsheets (Taken 11/25/2022 7806)  Discharge to home or other facility with appropriate resources: Identify barriers to discharge with patient and caregiver     Problem: ABCDS Injury Assessment  Goal: Absence of physical injury  11/25/2022 1532 by Irina Stuart RN  Outcome: Adequate for Discharge  11/25/2022 0906 by Irina Stuart RN  Outcome: Progressing

## 2022-11-25 NOTE — BRIEF OP NOTE
Brief Postoperative Note    Date:   11/25/2022  Patient name: Mac Vivas  YOB: 1955  Sex: male   MRN:   [id number]    PCP: Jesús Oropeza MD     Procedure:Dual Chamber pacemaker implantation    Pre-Op Diagnosis: Sinus bradycardia and intermittent AV block. Post-Op Diagnosis: Same    Surgeon: Carter Hair MD, [facility], [place], [place]    Assistant: Ca Ballesteros. Anesthesia/sedation:  Local lidocaine/fentanyl and midazolam as needed. Estimated Blood Loss (mL): Minimal    Complications: None    Recommendations:  See Epic orders. Bed rest for 2 hours. Keep pocket site dry. No shower for 7 days ((sponge bath and tub bath OK). ICE packs locally. Monitor site for bleeding or swelling. Pressure dressing x 24 hours. Chest x-ray PA and lateral views. Follow up in device clinic in 1 week.        Electronically signed by Cecy Disla MD, Jostin Cristina on 11/25/2022 at 3:16 PM

## 2022-11-25 NOTE — FLOWSHEET NOTE
Received from cath lab. Upper left chest pacemaker and loop removal sites stable. Safegaurd over pacemaker site with 60 mls of liquid in it. Pt aware to keep left arm below shoulder level. Taking sips of water. 0.9 normal saline cont. Wife at bedside. Denies needs. Resting with easy resp.

## 2022-11-25 NOTE — PROCEDURES
[facility]  Dept: [phone]  ELECTROPHYSIOLOGY PROCEDURE NOTE  Patients Demographics:  Date:   11/25/2022  Patient name:              Casandra Dean  YOB: 1955  Sex: male   MRN:   [id number]    Primary Care Provider:    Jakub Giles MD     Procedure Planned:  Dual chamber pacemaker implantation. Removal of loop recorder. Cardiologist:  Ottoniel Alex MD     Indications for Procedure  Sinus bradycardia and intermittent high grade AV block. Brief Medical History:  67/M with intermittent high grade AV block, underling bifascicular AV block and indication for beta-blocker (CAD atrial atrial flutter) electively presents for a pacemaker implantation. Normal LVEF. Medical Hx: Atrial flutter and Mobitz type II AV second-degree AV block and high grade AV block (ILR 10/2022), baseline bifascicular block, ILR implanted for bradycardia ( 3/15/21), HTN, HPL, CAD/PCI-LAD (12/20), overweight, MELVIN/CPAP, GERD, ulcerative colitis and calcium oxalate nephrolithiasis. Procedure Performed:  Dual chamber pacemaker implantation (deep septal). Explantation of loop recorder. Ultrasound guided left axillary vein accesses. Fluoroscopy of the leads. Procedure Details: The patient was brought to the electrophysiology lab in a fasting and non-sedated state. He was prepped and draped in the usual sterile fashion. Intravenous Fentanyl and Midazolam as needed was used for conscious sedation. The left pectoral region was liberally infiltrated with 2% lidocaine for local anesthesia. Left axillary vein was cannulated x2 under ultrasound guidance using 21G micro-puncture needle and 2 standard J-tip 0.035 inch guidewires were advanced into IVC. A 4 cm long incision was made in the left pectoral region using #10 blade and a subcutaneous pocket was made.  Two 7-French hemostatic peel away sheaths were advanced into the axillary vein over the guide wires. Through one of the sheaths right ventricular pacemaker lead was inserted and advanced under fluoro guidance and positioned into the right ventricular mid deep septum. In identical fashion, the right atrial lead was advanced through another sheath and positioned into the right atrial appendage. After deployment of helix the electric parameters of the leads were checked using external analyzing system and noted to be adequate. High output pacing with 10 volts was performed with both the leads and there was no phrenic nerve capture. The leads were secured to the pectoral fascia using 0 Ethibond over the suture sleeves. The pocket was thoroughly irrigated with antibiotic solution. The leads were attached to the pulse generator and lead parameters were tested one more time. The leads were wrapped behind the device and the device was placed inside the pocket. The pocket was closed in 3 layers using, 2-0, 3-0 and 4-0 Vicryl sutures. The incision was covered by steri-strips and site covered with Safeguard Cool pressure dressing. Following this, lidocaine was administered at loop insertion site and 1 cm incision was made. Using blunt dissection, the end of the loop recorder was exposed and loop recorder explanted by traction using Kathleen's forceps. The fluoroscopy of the chest showed no pneumothorax or pericardial or pleural effusion. The patient's hemodynamics were monitored throughout the procedure. He tolerated the procedure well. Medications:  Midazolam 2 mg intravenously. Fentanyl 100 mcg intravenously. Cefazolin 2 gm intravenously for surgical prophylaxis. Lidocaine 2%, 30 cc for local anaesthesia. Fluoroscopy Time:  2.9  minutes. Blood Loss:  Minimal.     Complication:  None. Implanted Device:  Pulse generator: [employer]ify, model [id number] and serial [id number]. Right atrial lead:  MedSnaps, model [id number] and serial [id number].     Right ventricular lead: [employer], model [id number] and serial [id number]. Intraoperative Electric Parameters:  RA lead: [email or web address]. RV lead: [email or web address]. Bradycardia Settings:  MVP  pulses per minute. Summary:  Successful dual chamber pacemaker implantation (deep septal). Successful explantation of loop recorder. Fluoroscopy of the leads. Normal pacemaker functions. Recommendations   Observation for 2 hours. Chest x-ray (2 views) and device check in 2 hours. Discharge and post operative care per protocol.        Electronically signed by Macy Jose MD, Beauford Rummage on 11/25/2022 at 3:22 PM

## 2022-11-25 NOTE — DISCHARGE INSTRUCTIONS
Activity     You should gradually return to your normal activities. For the first 4 weeks:         Do not lift more than 10 pounds         Do not swim, golf, bowl or vacuum          Do not raise your device side arm above your shoulder for 30 days         At your two-week follow-up visit, ask your doctor which  of the above activities you can resume         ALWAYS avoid any contact sports or hard blows to the chest or abdomen         Avoid sleeping on the left side and face down     You may resume your sexual activity when you feel ready. Wound care      Remove outer dressing in 24 hours, leave steri strips on . Leave safegaurd on for 24 hours. Keep your incision dry for at least 7 days, and then you may shower. Sponge bathes around the device insertion site for the first week, keeping the incision dry. Do not apply any ointment, talc, or lotion to the incision. Do not scratch, rub or touch the incision with your hands     The steri-strips (tape strips) will be removed at your follow-up visit, if they have not yet peeled off on their own     Call your doctor immediately if your incision looks red, becomes swollen or tender, or begins to ooze fluid. Also, notify your doctor at once if you develop a temperature  greater than 100 degrees Fahrenheit. Magnets and electromagnetic interference     Some devices or equipment that we encounter send out electrical signals. You must avoid:                Airport magnet security wants                Diathermy or other heart treatments                Arc or resistance welding                Electrical power generator plants                Electric cautery that is used for surgical procedures                MRI scans                Radio or television transmitting towers    Special Precautions: You must stay at arm's length from magnets of any kind       Cellular phones should be used on the ear opposite to your device.   Do not keep cellular phones in a pocket over your pacemaker        To avoid any interference with your device, you must pass through any security devices or schaffer (airports, department stores, and other public places) within               10 seconds. Microwave ovens, televisions, electric tools, and gardening equipment should not cause problems. If you have any questions about equipment not listed, call the  of your device or the Pacemaker clinic at [address] [place] [address]. General instructions      Always carry your device identification card with you. The device   will mail a permanent  identification card to you in 6-8 weeks. Keep your follow-up appointment with your doctor and the device clinic to be sure your device and the leads are working properly. Always take your medications at the times prescribed by your doctor. Always tell  medical personnel that you have a device. Memorize the name of the [address] [place] [address]    Follow-up appointments:     Make a follow-up appointment with you doctor 1-2 weeks after your implantation. If you have any problems or questions about your device, call the Pacemaker/ICD Clinic [phone]     Clinic hours Monday through Friday 8 am to 4 pm  IN CASE OF AN EMERGENCY, CALL [phone]            SEDATION/ANALGESIA INFORMATION/HOME GOING ADVICE    SEDATION / ANALGESIA INFORMATION / HOME GOING ADVICE  You have received the sedation/analgesia medication during your visit     Sedation/analgesia is used during short medical procedures under controlled supervision. The medication will produce a strong relaxation. You will be able to hear, speak and follow instructions, but your memory and alertness will be decreased. You will be able to swallow and breathe on your own. During sedation/analgesia your blood pressure, heart and breathing will be watched closely. After the procedure, you may not remember what was said or done. You may have the following effects from the medication. \"           Drowsiness, dizziness, sleepiness or confusion. \"           Difficulty remembering or delayed reaction times. \"           Loss of fine muscle control or difficulty with your balance especially while walking. \"           Difficulty focusing or blurred vision. You may not be aware of slight changes in your behavior and/or your reaction time because of the medication used during the procedure. Therefore you should follow these instructions. \"           Have someone responsible help you with your care. \"           Do not drive for 24 hours. \"           Do not operate equipment for 24 hours (lawnmowers, power tools, kitchen accessories, stove). \"           Do not drink any alcoholic beverages for a minimum of 24 hours. \"           Do not make important personal, legal or business decisions for 24 hours. \"           You may experience dizziness or lightheadedness. Move slowly and carefully, do not make sudden position changes. \"           Drink extra amounts of fluids today. \"           Increase your diet as tolerated (unless you have received specific instructions from your doctor). \"           If you feel nauseated, continue with liquids until the nausea is gone. \"           Notify your physician if you have not urinated within 8 hours after the procedure. \"           Resume your medications unless otherwise instructed. Contact your physician if you have any questions or concerns.      IF YOU REPORT TO AN EMERGENCY ROOM, DOCTOR'S OFFICE OR HOSPITAL WITHIN 24 HOURS AFTER YOUR PROCEDURE, BRING THIS SHEET AND YOUR AFTER VISIT SUMMARY WITH YOU AND GIVE IT TO THE PHYSICIAN OR NURSE ATTENDING YOU.

## 2022-11-25 NOTE — FLOWSHEET NOTE
Discharge instructions with AVS review completed. Wife present. Questions and concerns addressed. Iv catheter removed. Telmetry off. Wife assisting pt with dressing for discharge to home post pacemaker insertion. Upper left chest procedure sites stable. Safeguard cont to pacemaker site. 30 mls of liquid remain in safeguard device.

## 2022-11-26 LAB
EKG ATRIAL RATE: 62 BPM
EKG ATRIAL RATE: 63 BPM
EKG P AXIS: 15 DEGREES
EKG P AXIS: 20 DEGREES
EKG P-R INTERVAL: 316 MS
EKG P-R INTERVAL: 320 MS
EKG Q-T INTERVAL: 414 MS
EKG Q-T INTERVAL: 422 MS
EKG QRS DURATION: 140 MS
EKG QRS DURATION: 142 MS
EKG QTC CALCULATION (BAZETT): 423 MS
EKG QTC CALCULATION (BAZETT): 428 MS
EKG R AXIS: 38 DEGREES
EKG R AXIS: 52 DEGREES
EKG T AXIS: 15 DEGREES
EKG T AXIS: 25 DEGREES
EKG VENTRICULAR RATE: 62 BPM
EKG VENTRICULAR RATE: 63 BPM

## 2022-11-26 PROCEDURE — 93010 ELECTROCARDIOGRAM REPORT: CPT | Performed by: INTERNAL MEDICINE

## 2022-11-29 ENCOUNTER — TELEPHONE (OUTPATIENT)
Dept: CARDIOLOGY CLINIC | Age: 67
End: 2022-11-29

## 2022-11-29 NOTE — TELEPHONE ENCOUNTER
PT CALLED THE OFFICE AND IS ASKING ABOUT TOPROL    PT SAID THAT DR KEANE MENTIONED TO HIM GETTING STARTED ON TOPROL BUT NEVER DID. HE DID NOT KNOW IF YOU WANTED HIM ON METOPROLOL    HE HAS A F/U WITH DR SUH IN APRIL    DID YOU WANT TO SEE HIM IN 4 WEEKS TO DISCUSS HIS MEDS? ?? OR JUST HAVE HIM F/U WITH ANGELI

## 2022-11-29 NOTE — TELEPHONE ENCOUNTER
PT NOTIFIED AND NOTE PUT ON HIS APPT DESK TO BRING DIYA IN TO SEE THIS PT ON MONDAY WHEN HE COMES IN FOR HIS DEVICE CHECK

## 2022-12-05 ENCOUNTER — NURSE ONLY (OUTPATIENT)
Dept: CARDIOLOGY CLINIC | Age: 67
End: 2022-12-05
Payer: MEDICARE

## 2022-12-05 DIAGNOSIS — Z95.0 PACEMAKER: Primary | ICD-10-CM

## 2022-12-05 PROCEDURE — 93280 PM DEVICE PROGR EVAL DUAL: CPT | Performed by: INTERNAL MEDICINE

## 2022-12-05 NOTE — PROGRESS NOTES
Dr Jenna Hooker pt   Medtronic dual pacer initial check in office after implant    Medtronic linq removed    Battery 12.4 yrs remaining    Known afib/ xarelto   Afib burden <0.1%    P waves 2.6  Rv waves >20    Atrial impedence 380  Vent impedence 475      Atrial threshold 0.5 @ 0.4  Vent threshold 1.25 @ 0.4      Atrial and vent amplitudes 3.5 @ 0.4  A paced 43.5%  V paced 23.2%    Presents in asvs 66    Incision line with steri strips removed and area cleaned with chlora prep  Incision line well approx without any redness,drainage or warm to the touch      Steri strips removed from linq site. Linq explanted. No redness , drainage or warm to the touch    Verbal order per dr Nacho Valdes to start pt back on his toprol previous dose     He was taking 12.5 mg bid.      PT NOTIFIED

## 2022-12-13 RX ORDER — RIVAROXABAN 20 MG/1
TABLET, FILM COATED ORAL
Qty: 30 TABLET | Refills: 0 | Status: SHIPPED | OUTPATIENT
Start: 2022-12-13

## 2022-12-21 ENCOUNTER — OFFICE VISIT (OUTPATIENT)
Dept: CARDIOLOGY CLINIC | Age: 67
End: 2022-12-21
Payer: MEDICARE

## 2022-12-21 VITALS
SYSTOLIC BLOOD PRESSURE: 118 MMHG | BODY MASS INDEX: 30.37 KG/M2 | WEIGHT: 189 LBS | HEIGHT: 66 IN | DIASTOLIC BLOOD PRESSURE: 62 MMHG | HEART RATE: 68 BPM

## 2022-12-21 DIAGNOSIS — I48.3 TYPICAL ATRIAL FLUTTER (HCC): ICD-10-CM

## 2022-12-21 DIAGNOSIS — Z95.0 PACEMAKER: Primary | ICD-10-CM

## 2022-12-21 PROBLEM — I48.20 CHRONIC ATRIAL FIBRILLATION, UNSPECIFIED (HCC): Status: ACTIVE | Noted: 2022-12-21

## 2022-12-21 PROCEDURE — G8428 CUR MEDS NOT DOCUMENT: HCPCS | Performed by: INTERNAL MEDICINE

## 2022-12-21 PROCEDURE — 99214 OFFICE O/P EST MOD 30 MIN: CPT | Performed by: INTERNAL MEDICINE

## 2022-12-21 PROCEDURE — 4004F PT TOBACCO SCREEN RCVD TLK: CPT | Performed by: INTERNAL MEDICINE

## 2022-12-21 PROCEDURE — 3078F DIAST BP <80 MM HG: CPT | Performed by: INTERNAL MEDICINE

## 2022-12-21 PROCEDURE — 3017F COLORECTAL CA SCREEN DOC REV: CPT | Performed by: INTERNAL MEDICINE

## 2022-12-21 PROCEDURE — G8417 CALC BMI ABV UP PARAM F/U: HCPCS | Performed by: INTERNAL MEDICINE

## 2022-12-21 PROCEDURE — 1123F ACP DISCUSS/DSCN MKR DOCD: CPT | Performed by: INTERNAL MEDICINE

## 2022-12-21 PROCEDURE — G8484 FLU IMMUNIZE NO ADMIN: HCPCS | Performed by: INTERNAL MEDICINE

## 2022-12-21 PROCEDURE — 3074F SYST BP LT 130 MM HG: CPT | Performed by: INTERNAL MEDICINE

## 2022-12-21 RX ORDER — NITROGLYCERIN 0.4 MG/1
TABLET SUBLINGUAL
Qty: 25 TABLET | Refills: 3 | Status: SHIPPED | OUTPATIENT
Start: 2022-12-21 | End: 2022-12-21 | Stop reason: SDUPTHER

## 2022-12-21 RX ORDER — NITROGLYCERIN 0.4 MG/1
TABLET SUBLINGUAL
Qty: 25 TABLET | Refills: 3 | Status: SHIPPED | OUTPATIENT
Start: 2022-12-21

## 2022-12-21 RX ORDER — OMEPRAZOLE 20 MG/1
20 CAPSULE, DELAYED RELEASE ORAL DAILY
COMMUNITY

## 2022-12-21 NOTE — PROGRESS NOTES
[address] [facility] [address]  Dept: [phone]  Dept Fax: [phone]  Loc: [phone]    Visit Date: 12/21/2022    Mr. Sarah Balderrama is a 78 y.o. male  who presented for:  Chief Complaint   Patient presents with    Check-Up    Atrial Fibrillation    Coronary Artery Disease       HPI:   79 yo M c hx of CAD s/p PCI of LAD of 12/22/20, HTN, GERD, HLD, MELVIN on CPAP, Ulcerative colitis is here for a follow up. He had PPM placed  on 11/25/22. He is doing well. He denies any symptoms at that time. Current Outpatient Medications:     omeprazole (PRILOSEC) 20 MG delayed release capsule, Take 20 mg by mouth daily, Disp: , Rfl:     rivaroxaban (XARELTO) 20 MG TABS tablet, Take 1 tablet by mouth daily (with breakfast), Disp: 90 tablet, Rfl: 3    nitroGLYCERIN (NITROSTAT) 0.4 MG SL tablet, up to max of 3 total doses. If no relief after 1 dose, call 911., Disp: 25 tablet, Rfl: 3    metoprolol tartrate (LOPRESSOR) 25 MG tablet, Take 0.5 tablets by mouth 2 times daily, Disp: 90 tablet, Rfl: 3    Acetaminophen (TYLENOL ARTHRITIS PAIN PO), Take by mouth, Disp: , Rfl:     NONFORMULARY, Saline spray as needed, Disp: , Rfl:     losartan (COZAAR) 50 MG tablet, Take 1 tablet by mouth daily, Disp: 90 tablet, Rfl: 3    atorvastatin (LIPITOR) 40 MG tablet, Take 1 tablet by mouth daily, Disp: 90 tablet, Rfl: 3    multivitamin (THERAGRAN) per tablet, Take 1 tablet by mouth daily. , Disp: , Rfl:     aspirin 81 MG EC tablet, Take 81 mg by mouth daily. , Disp: , Rfl:     CPAP Machine MISC, by Does not apply route Change to CPAP pressure 9 cm H2O.  Increase EPR to 3 DL in 2 weeks, Disp: 1 each, Rfl: 0    Past Medical History  Apryl Alexander  has a past medical history of Atrial flutter ([address] Calcium oxalate renal stones, Coronary artery disease involving native coronary artery of native heart without angina pectoris, Essential hypertension, GERD (gastroesophageal reflux disease), Hyperlipidemia, Osteoarthritis, Palpitation, and Ulcerative colitis ([address] Social History  Uriel Lake  reports that he quit smoking about 34 years ago. His smoking use included cigarettes. He has a 10.00 pack-year smoking history. He uses smokeless tobacco. He reports current alcohol use. He reports that he does not use drugs. Family History  Uriel Lake family history includes Cancer in his mother and paternal grandfather; Diabetes in his maternal grandfather; Heart Disease in his maternal grandmother, paternal grandfather, and sister; Kidney Disease in his sister; No Known Problems in his father. Past Surgical History   Past Surgical History:   Procedure Laterality Date    CARDIAC CATHETERIZATION      COLONOSCOPY      4/2010=surveillance for ulcerative collitis    FINGER TRIGGER RELEASE      INSERTABLE CARDIAC MONITOR  01/2021    loop    KNEE ARTHROSCOPY      PTCA  12/2020    ROTATOR CUFF REPAIR      TONSILLECTOMY         Subjective:     REVIEW OF SYSTEMS  Constitutional: denies sweats, chills and fever  HENT: denies  congestion, sinus pressure, sneezing and sore throat. Eyes: denies  pain, discharge, redness and itching. Respiratory: denies apnea, cough  Gastrointestinal: denies blood in stool, constipation, diarrhea   Endocrine: denies cold intolerance, heat intolerance, polydipsia. Genitourinary: denies dysuria, enuresis, flank pain and hematuria. Musculoskeletal: denies arthralgias, joint swelling and neck pain. Neurological: denies numbness and headaches. Psychiatric/Behavioral: denies agitation, confusion, decreased concentration and dysphoric mood    All others reviewed and are negative.    Objective:     /62   Pulse 68   Ht 5' 6\" (1.676 m)   Wt 189 lb (85.7 kg)   BMI 30.51 kg/m²     Wt Readings from Last 3 Encounters:   12/21/22 189 lb (85.7 kg)   11/25/22 185 lb (83.9 kg)   11/17/22 185 lb (83.9 kg)     BP Readings from Last 3 Encounters:   12/21/22 118/62 11/25/22 (!) 103/58   11/17/22 118/64       PHYSICAL EXAM  Constitutional: Oriented to person, place, and time. Appears well-developed and well-nourished. HENT:   Head: Normocephalic and atraumatic. Eyes: EOM are normal. Pupils are equal, round, and reactive to light. Neck: Normal range of motion. Neck supple. No JVD present. Cardiovascular: Normal rate , normal heart sounds and intact distal pulses. Pulmonary/Chest: Effort normal and breath sounds normal. No respiratory distress. No wheezes. No rales. Abdominal: Soft. Bowel sounds are normal. No distension. There is no tenderness. Musculoskeletal: Normal range of motion. No edema. Neurological: Alert and oriented to person, place, and time. No cranial nerve deficit. Coordination normal.   Skin: Skin is warm and dry. Psychiatric: Normal mood and affect.        No results found for: CKTOTAL, CKMB, CKMBINDEX    Lab Results   Component Value Date/Time    WBC 4.4 11/25/2022 08:20 AM    RBC 4.23 11/25/2022 08:20 AM    RBC 4.42 03/20/2012 05:47 AM    HGB 13.5 11/25/2022 08:20 AM    HCT 39.5 11/25/2022 08:20 AM    MCV 93.4 11/25/2022 08:20 AM    MCH 31.9 11/25/2022 08:20 AM    MCHC 34.2 11/25/2022 08:20 AM    RDW 12.3 03/29/2018 05:36 AM     11/25/2022 08:20 AM    MPV 8.6 11/25/2022 08:20 AM       Lab Results   Component Value Date/Time     11/25/2022 08:20 AM    K 4.5 11/25/2022 08:20 AM    K 3.9 03/15/2021 09:08 AM     11/25/2022 08:20 AM    CO2 28 11/25/2022 08:20 AM    BUN 18 11/25/2022 08:20 AM    LABALBU 4.2 11/25/2022 08:20 AM    LABALBU 4.4 03/20/2012 05:47 AM    CREATININE 0.8 11/25/2022 08:20 AM    CALCIUM 9.8 11/25/2022 08:20 AM    LABGLOM >60 11/25/2022 08:13 AM    GLUCOSE 95 11/25/2022 08:20 AM    GLUCOSE 97 03/20/2012 05:47 AM       Lab Results   Component Value Date/Time    ALKPHOS 69 11/25/2022 08:20 AM    ALT 28 11/25/2022 08:20 AM    AST 26 11/25/2022 08:20 AM    PROT 6.6 11/25/2022 08:20 AM    BILITOT 0.9 11/25/2022 08:20 AM    BILIDIR <0.2 03/16/2022 08:45 AM    LABALBU 4.2 11/25/2022 08:20 AM    LABALBU 4.4 03/20/2012 05:47 AM       No results found for: MG    Lab Results   Component Value Date    INR 1.01 11/25/2022    INR 0.97 11/17/2022    INR 0.97 03/15/2021         No results found for: LABA1C    Lab Results   Component Value Date/Time    TRIG 42 11/17/2022 06:21 AM    HDL 57 11/17/2022 06:21 AM    LDLCALC 63 11/17/2022 06:21 AM       Lab Results   Component Value Date/Time    TSH 1.430 01/05/2022 09:52 AM         Testing Reviewed:      I haveindividually reviewed the below cardiac tests    EKG:    ECHO: Results for orders placed during the hospital encounter of 12/22/20    ECHO Complete 2D W Doppler W Color    Narrative  Transthoracic Echocardiography Report (TTE)    Demographics    Patient Name    Mirza Chu  Gender               Male  L    MR #            [id number]       Race                     Ethnicity    Account #       [de-identified]       Room Number          0016    Accession       [id number]      Date of Study        12/22/2020  Number    Date of Birth   1955      Referring Physician  Raji Oleary MD    Age             60 year(s)      Edward Begum RDCS    Interpreting         Jon Barraza MD  Physician            Echo reader of the  week    Procedure    Type of Study    TTE procedure:ECHOCARDIOGRAM COMPLETE 2D W DOPPLER W COLOR. Procedure Date  Date: 12/22/2020 Start: 07:19 AM    Study Location: Bedside  Technical Quality: Adequate visualization    Indications:Chest pain. Additional Medical History:Ex Smoker, Hypertension, Hyperlipidemia. Patient Status: Routine    Height: 66 inches Weight: 202 pounds BSA: 2.01 m^2 BMI: 32.6 kg/m^2    BP: 129/74 mmHg    Allergies  - No Known Allergies. Conclusions    Summary  limited echo  Ejection fraction is visually estimated at 60%.   Overall left ventricular function is normal.  No evidence of any pericardial effusion. Signature    ----------------------------------------------------------------  Electronically signed by Jeane Alejandro MD (Interpreting  physician) on 12/27/2020 at 12:07 PM  ----------------------------------------------------------------    Findings    Mitral Valve  Structurally normal mitral valve. Aortic Valve  Aortic valve appears tricuspid. Tricuspid Valve  Tricuspid valve is structurally normal.    Pulmonic Valve  The pulmonic valve was not well visualized . Pulmonic valve is structurally normal.    Left Atrium  Normal size left atrium. Left Ventricle  Ejection fraction is visually estimated at 60%. Overall left ventricular function is normal.    Right Atrium  The right atrium is of normal size. Right Ventricle  Normal right ventricular size and function. Pericardial Effusion  No evidence of any pericardial effusion.     M-Mode/2D Measurements & Calculations    LV Diastolic    LV Systolic Dimension: 3  AV Cusp Separation: 2 cmLA  Dimension: 4.3  cm                        Dimension: 4 cmAO Root  cm              LV Volume Diastolic: 05.6 Dimension: 3.1 cmLA Area: 24.8  LV FS:30.2 %    ml                        cm^2  LV PW           LV Volume Systolic: 35 ml  Diastolic: 1.1  LV EDV/LV EDV Index: 83.1  cm              ml/41 m^2LV ESV/LV ESV  Septum          Index: 35 ml/17 m^2       RV Diastolic Dimension: 3 cm  Diastolic: 1.1  EF Calculated: 57.9 %  cm                                        LA/Aorta: 1.29  Ascending Aorta: 3.4 cm  LA volume/Index: 80.6 ml /40m^2    Doppler Measurements & Calculations    MV Peak E-Wave: 80.6 cm/s  AV Peak Velocity: 121  LVOT Peak Velocity: 87.4  MV Peak A-Wave: 41.6 cm/s  cm/s                   cm/s  MV E/A Ratio: 1.94         AV Peak Gradient: 5.86 LVOT Peak Gradient: 3  MV Peak Gradient: 2.6 mmHg mmHg                   mmHg    MV Deceleration Time: 264                         TV Peak E-Wave: 54.4  msec cm/s  TV Peak A-Wave: 41.1  IVRT: 106 msec         cm/s  MV E' Septal Velocity: 7.8  cm/s                                              TV Peak Gradient: 1.18  MV A' Septal Velocity: 11  AV DVI (Vmax):0.72     mmHg  cm/s  MV E' Lateral Velocity:                           PV Peak Velocity: 70.7  9.4 cm/s                                          cm/s  MV A' Lateral Velocity:                           PV Peak Gradient: 2 mmHg  13.2 cm/s  E/E' septal: 10.33  E/E' lateral: 8.57    [id number]      STRESS:    CATH:    Assessment/Plan       Diagnosis Orders   1. Pacemaker        2. Typical atrial flutter            High grade AVB s/p Dual chamber PPM  Aflutter on Xarelto  CAD s/p PCI on LAD 12/2020  UC  HTN  HLD  MELVIN on CPAP    S/p recent PPM  He is doing well,   Site looks good  No signs of infection  On xarelto and aspirin  No more symptoms. Completed cardiac rehab  The patient is asked to make an attempt to improve diet and exercise patterns to aid in medical management of this problem. Advised more plant based nutrition/meditarrean diet   Advised patient to call office or seek immediate medical attention if there is any new onset of  any chest pain, sob, palpitations, lightheadedness, dizziness, orthopnea, PND or pedal edema. All medication side effects were discussed in details. Thank youfor allowing me to participate in the care of this patient. Please do not hesitate to contact me for any further questions. Return in about 1 year (around 12/21/2023), or if symptoms worsen or fail to improve, for Regular follow up, Review testing.        Electronically signed by Farzad Hanna MD [facility]  12/21/2022 at 7:46 AM EDT

## 2022-12-21 NOTE — PROGRESS NOTES
Pt here for check up -Karla pacer 11/25/22    Pt continues with site soreness,     Pt has questions on the restrictions

## 2022-12-27 RX ORDER — NITROGLYCERIN 0.4 MG/1
TABLET SUBLINGUAL
Qty: 25 TABLET | Refills: 3 | OUTPATIENT
Start: 2022-12-27

## 2023-01-24 ENCOUNTER — TELEPHONE (OUTPATIENT)
Dept: FAMILY MEDICINE CLINIC | Age: 68
End: 2023-01-24

## 2023-01-24 NOTE — TELEPHONE ENCOUNTER
Patient is scheduled for OV on 1/31. Patient asking if still needs to have labs done prior. Patient was in the hospital in Nov.  Lipid, BMP and CBC were done then. Patient has orders for CBC,CMP, lipid, TSH and PSA. Informed patient may only need to have TSH And PSA done prior to appt but would check with PCP. Any other labs patient needs done?

## 2023-01-24 NOTE — TELEPHONE ENCOUNTER
Do ALL labs ordered  This will recheck that everything from the hospital came back to normal  Call patient

## 2023-01-25 ENCOUNTER — NURSE ONLY (OUTPATIENT)
Dept: LAB | Age: 68
End: 2023-01-25

## 2023-01-25 DIAGNOSIS — I10 ESSENTIAL HYPERTENSION: ICD-10-CM

## 2023-01-25 DIAGNOSIS — N52.9 ERECTILE DYSFUNCTION, UNSPECIFIED ERECTILE DYSFUNCTION TYPE: ICD-10-CM

## 2023-01-25 DIAGNOSIS — E78.00 PURE HYPERCHOLESTEROLEMIA: ICD-10-CM

## 2023-01-25 DIAGNOSIS — K21.9 GASTROESOPHAGEAL REFLUX DISEASE WITHOUT ESOPHAGITIS: ICD-10-CM

## 2023-01-25 LAB
ALBUMIN SERPL BCG-MCNC: 4.3 G/DL (ref 3.5–5.1)
ALP SERPL-CCNC: 78 U/L (ref 38–126)
ALT SERPL W/O P-5'-P-CCNC: 25 U/L (ref 11–66)
ANION GAP SERPL CALC-SCNC: 11 MEQ/L (ref 8–16)
AST SERPL-CCNC: 24 U/L (ref 5–40)
BILIRUB SERPL-MCNC: 0.7 MG/DL (ref 0.3–1.2)
BUN SERPL-MCNC: 18 MG/DL (ref 7–22)
CALCIUM SERPL-MCNC: 9.5 MG/DL (ref 8.5–10.5)
CHLORIDE SERPL-SCNC: 103 MEQ/L (ref 98–111)
CHOLEST SERPL-MCNC: 136 MG/DL (ref 100–199)
CO2 SERPL-SCNC: 29 MEQ/L (ref 23–33)
CREAT SERPL-MCNC: 0.8 MG/DL (ref 0.4–1.2)
DEPRECATED RDW RBC AUTO: 39.2 FL (ref 35–45)
ERYTHROCYTE [DISTWIDTH] IN BLOOD BY AUTOMATED COUNT: 11.6 % (ref 11.5–14.5)
GFR SERPL CREATININE-BSD FRML MDRD: > 60 ML/MIN/1.73M2
GLUCOSE FASTING: 95 MG/DL (ref 70–108)
HCT VFR BLD AUTO: 41 % (ref 42–52)
HDLC SERPL-MCNC: 53 MG/DL
HGB BLD-MCNC: 13.7 GM/DL (ref 14–18)
LDLC SERPL CALC-MCNC: 72 MG/DL
MCH RBC QN AUTO: 30.9 PG (ref 26–33)
MCHC RBC AUTO-ENTMCNC: 33.4 GM/DL (ref 32.2–35.5)
MCV RBC AUTO: 92.3 FL (ref 80–94)
PLATELET # BLD AUTO: 203 THOU/MM3 (ref 130–400)
PMV BLD AUTO: 8.8 FL (ref 9.4–12.4)
POTASSIUM SERPL-SCNC: 5.1 MEQ/L (ref 3.5–5.2)
PROT SERPL-MCNC: 6.8 G/DL (ref 6.1–8)
PSA SERPL-MCNC: 0.47 NG/ML (ref 0–1)
RBC # BLD AUTO: 4.44 MILL/MM3 (ref 4.7–6.1)
SODIUM SERPL-SCNC: 143 MEQ/L (ref 135–145)
TRIGL SERPL-MCNC: 53 MG/DL (ref 0–199)
TSH SERPL DL<=0.005 MIU/L-ACNC: 2 UIU/ML (ref 0.4–4.2)
WBC # BLD AUTO: 4.1 THOU/MM3 (ref 4.8–10.8)

## 2023-01-25 NOTE — PROGRESS NOTES
Medicare Annual Wellness Visit    Krystina Jackson is here for Medicare AWV    Assessment & Plan   Initial Medicare annual wellness visit  Essential hypertension  -     losartan (COZAAR) 50 MG tablet; Take 1 tablet by mouth daily, Disp-90 tablet, R-3Normal  Other ulcerative colitis without complication (HCC)  Typical atrial flutter (HCC)  Anginal equivalent (HCC)  Pure hypercholesterolemia  -     atorvastatin (LIPITOR) 40 MG tablet; Take 1 tablet by mouth daily, Disp-90 tablet, R-3Normal  Coronary artery disease involving native coronary artery of native heart without angina pectoris  Gastroesophageal reflux disease without esophagitis  Chronic seasonal allergic rhinitis due to pollen  Primary osteoarthritis involving multiple joints  Episodic cluster headache, not intractable  Hypernatremia  Angina, class III (HCC)  Erectile dysfunction, unspecified erectile dysfunction type  Onychomycosis  Palpitation  Oral lesion  AV block  RBBB  Urinary frequency  Chronic atrial fibrillation, unspecified    Recommendations for Preventive Services Due: see orders and patient instructions/AVS.  Recommended screening schedule for the next 5-10 years is provided to the patient in written form: see Patient Instructions/AVS.     Return in 6 months (on 7/31/2023) for Medicare Annual Wellness Visit in 1 year. Subjective       Well Adult Physical: Patient here for a comprehensive physical exam.The patient reports all chronic issues are well controlled on current medications. Do you take any herbs or supplements that were not prescribed by a doctor? no Are you taking calcium supplements? no Are you taking aspirin daily? no   History:  Any STD's in the past? none    Occasional tinnitus becoming more frequent and louder. He is willing to return for hearing test to reassess. Sometimes associated with sore throat and PND. Better on daily xyzal and saline nasal spray.      Hypertension and hyperlipidemia are under good control but he has now also gone through heart cath and stent placement. He is feeling well and has lost weight and now exercising daily. Sinus pressure comes and goes all year. Now also having headaches in the evening. Left eye pressure occasional, tylenol and motrin helps. Occasional blood in the mucus from the nose. Taking xyzal daily in the worst seasons. GERD controlled on PPI. UC being followed by GI. Wants to wean off PPI again this spring. Lower pressure with urination. ED happening more. The following acute and/or chronic problems were also addressed today:  See above    Patient's complete Health Risk Assessment and screening values have been reviewed and are found in Flowsheets. The following problems were reviewed today and where indicated follow up appointments were made and/or referrals ordered.     Positive Risk Factor Screenings with Interventions:                 Weight and Activity:  Physical Activity: Sufficiently Active    Days of Exercise per Week: 5 days    Minutes of Exercise per Session: 60 min     On average, how many days per week do you engage in moderate to strenuous exercise (like a brisk walk)?: 5 days  Have you lost any weight without trying in the past 3 months?: No  Body mass index: (!) 30.34  Obesity Interventions:  low carbohydrate diet, exercise for at least 150 minutes/week             Advanced Directives:  Do you have a Living Will?: (!) No  Tobacco Use:  Tobacco Use: High Risk    Smoking Tobacco Use: Former    Smokeless Tobacco Use: Current    Passive Exposure: Not on file     E-cigarette/Vaping       Questions Responses    E-cigarette/Vaping Use Never User    Start Date     Passive Exposure     Quit Date     Counseling Given     Comments         Interventions:  Patient advised to follow up in the office for further evalution and treatment                  Objective   Vitals:    01/31/23 1138   BP: 128/78   Site: Left Upper Arm   Position: Sitting   Cuff Size: Large Adult   Pulse: 60   Resp: 14   Temp: 98.1 °F (36.7 °C)   TempSrc: Temporal   Weight: 188 lb (85.3 kg)   Height: 5' 6\" (1.676 m)      Body mass index is 30.34 kg/m². Physical Exam   Constitutional: Vital signs are normal. He appears well-developed and well-nourished. He is active. HENT:   Head: Normocephalic and atraumatic. Right Ear: Tympanic membrane, external ear and ear canal normal. No drainage or tenderness. Left Ear: Tympanic membrane, external ear and ear canal normal. No drainage or tenderness. Nose: Nose normal. No mucosal edema or rhinorrhea. Mouth/Throat: Uvula is midline, oropharynx is clear and moist and mucous membranes are normal. Mucous membranes are not pale. Normal dentition. No posterior oropharyngeal edema or posterior oropharyngeal erythema. Eyes: Lids are normal. Right eye exhibits no chemosis and no discharge. Left eye exhibits no chemosis and no drainage. Right conjunctiva has no hemorrhage. Left conjunctiva has no hemorrhage. Right eye exhibits normal extraocular motion. Left eye exhibits normal extraocular motion. Right pupil is round and reactive. Left pupil is round and reactive. Pupils are equal.   Cardiovascular: Normal rate, regular rhythm, S1 normal, S2 normal and normal heart sounds. Exam reveals no gallop. No murmur heard. Pulmonary/Chest: Effort normal and breath sounds normal. No respiratory distress. He has no wheezes. He has no rhonchi. He has no rales. Abdominal: Soft. Normal appearance and bowel sounds are normal. He exhibits no distension and no mass. There is no hepatosplenomegaly. No tenderness. He has no rigidity, no rebound and no guarding. No hernia. Musculoskeletal:        Right lower leg: He exhibits no edema. Left lower leg: He exhibits no edema. Neurological: He is alert. Oriented and pleasent         No Known Allergies  Prior to Visit Medications    Medication Sig Taking?  Authorizing Provider   losartan (COZAAR) 50 MG tablet Take 1 tablet by mouth daily Yes Guanakito Andino MD   atorvastatin (LIPITOR) 40 MG tablet Take 1 tablet by mouth daily Yes Guanakito Andino MD   levocetirizine (XYZAL) 5 MG tablet Take 1 tablet by mouth nightly Yes Guanakito Andino MD   omeprazole (PRILOSEC) 20 MG delayed release capsule Take 20 mg by mouth daily Yes Historical Provider, MD   rivaroxaban (XARELTO) 20 MG TABS tablet Take 1 tablet by mouth daily (with breakfast) Yes Antolin Kumar MD   nitroGLYCERIN (NITROSTAT) 0.4 MG SL tablet up to max of 3 total doses. If no relief after 1 dose, call 911. Yes Cierra Pina MD   metoprolol tartrate (LOPRESSOR) 25 MG tablet Take 0.5 tablets by mouth 2 times daily Yes Kavya Monteiro MD   Acetaminophen (TYLENOL ARTHRITIS PAIN PO) Take by mouth Yes Historical Provider, MD   NONFORMULARY Saline spray as needed Yes Historical Provider, MD   CPAP Machine MISC by Does not apply route Change to CPAP pressure 9 cm H2O. Increase EPR to 3  DL in 2 weeks Yes SAI Sanchez CNP   multivitamin [facility]) per tablet Take 1 tablet by mouth daily. Yes Historical Provider, MD   aspirin 81 MG EC tablet Take 81 mg by mouth daily.    Yes Historical Provider, MD Marie (Including outside providers/suppliers regularly involved in providing care):   Patient Care Team:  Guanakito Andino MD as PCP - Marilynn Whitaker MD as PCP - [facility] Empaneled Provider  Nimisha Wilkes MD as Consulting Physician (Gastroenterology)  SAI Ashby CNP (Nurse Practitioner)  Cierra Pina MD as Cardiologist (Cardiology)     Reviewed and updated this visit:  Tobacco  Allergies  Meds  Med Hx  Surg Hx  Soc Hx  Fam Hx

## 2023-01-25 NOTE — PATIENT INSTRUCTIONS
Personalized Preventive Plan for Nia Craig - 1/31/2023  Medicare offers a range of preventive health benefits. Some of the tests and screenings are paid in full while other may be subject to a deductible, co-insurance, and/or copay. Some of these benefits include a comprehensive review of your medical history including lifestyle, illnesses that may run in your family, and various assessments and screenings as appropriate. After reviewing your medical record and screening and assessments performed today your provider may have ordered immunizations, labs, imaging, and/or referrals for you. A list of these orders (if applicable) as well as your Preventive Care list are included within your After Visit Summary for your review. Other Preventive Recommendations:    A preventive eye exam performed by an eye specialist is recommended every 1-2 years to screen for glaucoma; cataracts, macular degeneration, and other eye disorders. A preventive dental visit is recommended every 6 months. Try to get at least 150 minutes of exercise per week or 10,000 steps per day on a pedometer . Order or download the FREE \"Exercise & Physical Activity: Your Everyday Guide\" from [employer]. Call 3-857.149.6474 or search [employer] online. You need 0352-5604 mg of calcium and 5063-2760 IU of vitamin D per day. It is possible to meet your calcium requirement with diet alone, but a vitamin D supplement is usually necessary to meet this goal.  When exposed to the sun, use a sunscreen that protects against both UVA and UVB radiation with an SPF of 30 or greater. Reapply every 2 to 3 hours or after sweating, drying off with a towel, or swimming. Always wear a seat belt when traveling in a car. Always wear a helmet when riding a bicycle or motorcycle.

## 2023-01-31 ENCOUNTER — OFFICE VISIT (OUTPATIENT)
Dept: FAMILY MEDICINE CLINIC | Age: 68
End: 2023-01-31
Payer: MEDICARE

## 2023-01-31 VITALS
DIASTOLIC BLOOD PRESSURE: 78 MMHG | HEIGHT: 66 IN | SYSTOLIC BLOOD PRESSURE: 128 MMHG | TEMPERATURE: 98.1 F | WEIGHT: 188 LBS | RESPIRATION RATE: 14 BRPM | HEART RATE: 60 BPM | BODY MASS INDEX: 30.22 KG/M2

## 2023-01-31 DIAGNOSIS — K51.80 OTHER ULCERATIVE COLITIS WITHOUT COMPLICATION (HCC): ICD-10-CM

## 2023-01-31 DIAGNOSIS — R00.2 PALPITATION: ICD-10-CM

## 2023-01-31 DIAGNOSIS — K21.9 GASTROESOPHAGEAL REFLUX DISEASE WITHOUT ESOPHAGITIS: ICD-10-CM

## 2023-01-31 DIAGNOSIS — E78.00 PURE HYPERCHOLESTEROLEMIA: ICD-10-CM

## 2023-01-31 DIAGNOSIS — I20.8 ANGINAL EQUIVALENT (HCC): ICD-10-CM

## 2023-01-31 DIAGNOSIS — I20.9 ANGINA, CLASS III (HCC): ICD-10-CM

## 2023-01-31 DIAGNOSIS — I10 ESSENTIAL HYPERTENSION: ICD-10-CM

## 2023-01-31 DIAGNOSIS — N52.9 ERECTILE DYSFUNCTION, UNSPECIFIED ERECTILE DYSFUNCTION TYPE: ICD-10-CM

## 2023-01-31 DIAGNOSIS — I44.30 AV BLOCK: ICD-10-CM

## 2023-01-31 DIAGNOSIS — K13.70 ORAL LESION: ICD-10-CM

## 2023-01-31 DIAGNOSIS — I25.10 CORONARY ARTERY DISEASE INVOLVING NATIVE CORONARY ARTERY OF NATIVE HEART WITHOUT ANGINA PECTORIS: ICD-10-CM

## 2023-01-31 DIAGNOSIS — J30.1 CHRONIC SEASONAL ALLERGIC RHINITIS DUE TO POLLEN: ICD-10-CM

## 2023-01-31 DIAGNOSIS — I48.3 TYPICAL ATRIAL FLUTTER (HCC): ICD-10-CM

## 2023-01-31 DIAGNOSIS — M15.9 PRIMARY OSTEOARTHRITIS INVOLVING MULTIPLE JOINTS: ICD-10-CM

## 2023-01-31 DIAGNOSIS — R35.0 URINARY FREQUENCY: ICD-10-CM

## 2023-01-31 DIAGNOSIS — I48.20 CHRONIC ATRIAL FIBRILLATION, UNSPECIFIED (HCC): ICD-10-CM

## 2023-01-31 DIAGNOSIS — B35.1 ONYCHOMYCOSIS: ICD-10-CM

## 2023-01-31 DIAGNOSIS — I45.10 RBBB: ICD-10-CM

## 2023-01-31 DIAGNOSIS — Z00.00 INITIAL MEDICARE ANNUAL WELLNESS VISIT: Primary | ICD-10-CM

## 2023-01-31 DIAGNOSIS — E87.0 HYPERNATREMIA: ICD-10-CM

## 2023-01-31 DIAGNOSIS — G44.019 EPISODIC CLUSTER HEADACHE, NOT INTRACTABLE: ICD-10-CM

## 2023-01-31 PROCEDURE — 3017F COLORECTAL CA SCREEN DOC REV: CPT | Performed by: FAMILY MEDICINE

## 2023-01-31 PROCEDURE — G8482 FLU IMMUNIZE ORDER/ADMIN: HCPCS | Performed by: FAMILY MEDICINE

## 2023-01-31 PROCEDURE — 1123F ACP DISCUSS/DSCN MKR DOCD: CPT | Performed by: FAMILY MEDICINE

## 2023-01-31 PROCEDURE — 3074F SYST BP LT 130 MM HG: CPT | Performed by: FAMILY MEDICINE

## 2023-01-31 PROCEDURE — 3078F DIAST BP <80 MM HG: CPT | Performed by: FAMILY MEDICINE

## 2023-01-31 PROCEDURE — G0438 PPPS, INITIAL VISIT: HCPCS | Performed by: FAMILY MEDICINE

## 2023-01-31 RX ORDER — ATORVASTATIN CALCIUM 40 MG/1
40 TABLET, FILM COATED ORAL DAILY
Qty: 90 TABLET | Refills: 3 | Status: SHIPPED | OUTPATIENT
Start: 2023-01-31

## 2023-01-31 RX ORDER — LEVOCETIRIZINE DIHYDROCHLORIDE 5 MG/1
5 TABLET, FILM COATED ORAL NIGHTLY
Qty: 90 TABLET | Refills: 3 | Status: SHIPPED | OUTPATIENT
Start: 2023-01-31

## 2023-01-31 RX ORDER — LOSARTAN POTASSIUM 50 MG/1
TABLET ORAL
Qty: 90 TABLET | Refills: 3 | Status: SHIPPED | OUTPATIENT
Start: 2023-01-31

## 2023-01-31 ASSESSMENT — PATIENT HEALTH QUESTIONNAIRE - PHQ9
SUM OF ALL RESPONSES TO PHQ QUESTIONS 1-9: 0
SUM OF ALL RESPONSES TO PHQ9 QUESTIONS 1 & 2: 0
1. LITTLE INTEREST OR PLEASURE IN DOING THINGS: 0
SUM OF ALL RESPONSES TO PHQ QUESTIONS 1-9: 0
2. FEELING DOWN, DEPRESSED OR HOPELESS: 0
SUM OF ALL RESPONSES TO PHQ QUESTIONS 1-9: 0
SUM OF ALL RESPONSES TO PHQ QUESTIONS 1-9: 0

## 2023-01-31 ASSESSMENT — LIFESTYLE VARIABLES
HOW MANY STANDARD DRINKS CONTAINING ALCOHOL DO YOU HAVE ON A TYPICAL DAY: 1 OR 2
HOW OFTEN DO YOU HAVE A DRINK CONTAINING ALCOHOL: MONTHLY OR LESS

## 2023-02-13 ENCOUNTER — TELEPHONE (OUTPATIENT)
Dept: CARDIOLOGY CLINIC | Age: 68
End: 2023-02-13

## 2023-02-27 ENCOUNTER — OFFICE VISIT (OUTPATIENT)
Dept: PULMONOLOGY | Age: 68
End: 2023-02-27
Payer: MEDICARE

## 2023-02-27 VITALS
HEIGHT: 66 IN | SYSTOLIC BLOOD PRESSURE: 126 MMHG | WEIGHT: 188.8 LBS | OXYGEN SATURATION: 93 % | BODY MASS INDEX: 30.34 KG/M2 | DIASTOLIC BLOOD PRESSURE: 80 MMHG | HEART RATE: 64 BPM | TEMPERATURE: 98.2 F

## 2023-02-27 DIAGNOSIS — G47.33 OSA ON CPAP: Primary | ICD-10-CM

## 2023-02-27 DIAGNOSIS — E66.9 OBESITY (BMI 30-39.9): ICD-10-CM

## 2023-02-27 DIAGNOSIS — Z99.89 OSA ON CPAP: Primary | ICD-10-CM

## 2023-02-27 PROCEDURE — G8427 DOCREV CUR MEDS BY ELIG CLIN: HCPCS | Performed by: NURSE PRACTITIONER

## 2023-02-27 PROCEDURE — 99213 OFFICE O/P EST LOW 20 MIN: CPT | Performed by: NURSE PRACTITIONER

## 2023-02-27 PROCEDURE — 3017F COLORECTAL CA SCREEN DOC REV: CPT | Performed by: NURSE PRACTITIONER

## 2023-02-27 PROCEDURE — 3074F SYST BP LT 130 MM HG: CPT | Performed by: NURSE PRACTITIONER

## 2023-02-27 PROCEDURE — G8417 CALC BMI ABV UP PARAM F/U: HCPCS | Performed by: NURSE PRACTITIONER

## 2023-02-27 PROCEDURE — G8482 FLU IMMUNIZE ORDER/ADMIN: HCPCS | Performed by: NURSE PRACTITIONER

## 2023-02-27 PROCEDURE — 4004F PT TOBACCO SCREEN RCVD TLK: CPT | Performed by: NURSE PRACTITIONER

## 2023-02-27 PROCEDURE — 1123F ACP DISCUSS/DSCN MKR DOCD: CPT | Performed by: NURSE PRACTITIONER

## 2023-02-27 PROCEDURE — 3079F DIAST BP 80-89 MM HG: CPT | Performed by: NURSE PRACTITIONER

## 2023-02-27 ASSESSMENT — ENCOUNTER SYMPTOMS
RESPIRATORY NEGATIVE: 1
NAUSEA: 0
STRIDOR: 0
CHEST TIGHTNESS: 0
EYES NEGATIVE: 1
WHEEZING: 0
DIARRHEA: 0
GASTROINTESTINAL NEGATIVE: 1
VOMITING: 0
ALLERGIC/IMMUNOLOGIC NEGATIVE: 1

## 2023-02-27 NOTE — PROGRESS NOTES
[facility]      Soco Cox         [id number]  2/27/2023   Chief Complaint   Patient presents with    Follow-up     1 year MELVIN        Pt of Dr. Bret Mcdonald     PAP Download:   Original or initial AHI: 36.4    Date of initial study: 1/26/21        Compliant  97%     Noncompliant 3 %     PAP Type CPAP Level  9   Avg Hrs/Day 6  AHI: 4.4   Recorded compliance dates 1/25/23-2/23/23  Machine/Mfg:   [x] ResMed    [] Respironics/Dreamstation   Interface:   [] Nasal    [] Nasal pillows   [x] FFM      Provider:      [x] SR[facility]     []Rita     [] [employer]    [] Judy Finder    [] Lamont               [] [facility]      [] Adaptive    [] [address]      [] Other    Neck Size: 18  Mallampati 2  ESS:  3  SAQLI: 94    Here is a scan of the most recent download:            Presentation:   Damien Mcgraw presents for sleep medicine follow up for obstructive sleep apnea  Since the last visit, Damien Mcgraw has been compliant with his PAP therapy and continues to see benefit from its use  Awake and alert today  No sleep medication use   Pacemaker insertion per Cardiology on 11/25/22    Equipment issues: The pressure is  acceptable, the mask is acceptable     Sleep issues:  Do you feel better? Yes  More rested? Yes   Better concentration? NA    Progress History:   Since last visit any new medical issues? No  New ER or hospital visits? No  Any new or changes in medicines? No  Any new sleep medicines? No    Review of Systems -   Review of Systems   Constitutional: Negative. Negative for chills, fever and unexpected weight change. HENT: Negative. Eyes: Negative. Respiratory: Negative. Negative for chest tightness, wheezing and stridor. Cardiovascular:  Negative for chest pain and leg swelling. Gastrointestinal: Negative. Negative for diarrhea, nausea and vomiting. Endocrine: Negative. Genitourinary: Negative. Negative for dysuria. Musculoskeletal: Negative. Skin: Negative. Allergic/Immunologic: Negative. Neurological: Negative. Hematological: Negative. Psychiatric/Behavioral: Negative. Physical Exam:    BMI:  Body mass index is 30.47 kg/m². Wt Readings from Last 3 Encounters:   02/27/23 188 lb 12.8 oz (85.6 kg)   01/31/23 188 lb (85.3 kg)   12/21/22 189 lb (85.7 kg)     Weight stable / unchanged  Vitals: /80 (Site: Left Upper Arm, Position: Sitting, Cuff Size: Medium Adult)   Pulse 64   Temp 98.2 °F (36.8 °C) (Skin)   Ht 5' 6\" (1.676 m)   Wt 188 lb 12.8 oz (85.6 kg)   SpO2 93%   BMI 30.47 kg/m²       Physical Exam  Vitals and nursing note reviewed. Constitutional:       General: He is not in acute distress. Appearance: He is well-developed. He is obese. HENT:      Head: Normocephalic and atraumatic. Neck:      Trachea: No tracheal deviation. Cardiovascular:      Rate and Rhythm: Normal rate and regular rhythm. Heart sounds: Normal heart sounds. No murmur heard. Pulmonary:      Effort: Pulmonary effort is normal. No respiratory distress. Breath sounds: Normal breath sounds. No stridor. No wheezing or rales. Chest:      Chest wall: No tenderness. Abdominal:      General: Bowel sounds are normal. There is no distension. Palpations: Abdomen is soft. Skin:     General: Skin is warm and dry. Capillary Refill: Capillary refill takes less than 2 seconds. Neurological:      Mental Status: He is alert and oriented to person, place, and time. Psychiatric:         Behavior: Behavior normal.         Thought Content: Thought content normal.         Judgment: Judgment normal.         ASSESSMENT/DIAGNOSIS     Diagnosis Orders   1. MELVIN on CPAP  DME Order for CPAP as OP      2. Obesity (BMI 30-39. 9)                 Plan   Do you need any equipment today? Yes     - Download reviewed and discussed with patient  - He  was advised to continue current positive airway pressure therapy with above described pressure.    - He  advised to keep good compliance with current recommended pressure to get optimal results and clinical improvement  - Recommend 7-9 hours of sleep with PAP  - He was advised to call DME company regarding supplies if needed.   -He call my office for earlier appointment if needed for worsening of sleep symptoms.   - He was instructed on weight loss  - Catrachita Murdock was educated about my impression and plan. Patient verbalizesunderstanding.   We will see Miley Hobbsgeraldine Collar back in: 1 year with download    Information added by my medical assistant/LPN was reviewed today     Electronically signed by SAI Hoyos CNP on 2/27/2023 at 8:54 AM

## 2023-03-13 ENCOUNTER — NURSE ONLY (OUTPATIENT)
Dept: CARDIOLOGY CLINIC | Age: 68
End: 2023-03-13
Payer: MEDICARE

## 2023-03-13 DIAGNOSIS — Z95.0 PACEMAKER: Primary | ICD-10-CM

## 2023-03-13 PROCEDURE — 93280 PM DEVICE PROGR EVAL DUAL: CPT | Performed by: INTERNAL MEDICINE

## 2023-03-13 NOTE — PROGRESS NOTES
Dr patel pt   Medtronic dual pacer 3 mth chronic values in office today    [facility] 15.6 yrs remaining      Aair<=>dddr     A paced 31%  V paced 17.3%    Atrial impedence 380  Vent impedence 551    P waves 11.9  Rv waves >20    Atrial threshold 0.5 @ 0.4  Vent threshold 0.625 @ 0.4    Atrial amplitude programmed from 3.5 @ 0.4 to 2 @ 0.4  Vent amplitude programmed from 3.5 @ 0.4 to 2 @ 0.4    Known afib/ xarelto  Afib burden <0.1%

## 2023-07-30 NOTE — PROGRESS NOTES
[address] [address]  Dept: [phone]  Dept Fax: [phone]  Loc: [phone]    Sudheer Anderson is a 78 y.o. male who presents today for:  Chief Complaint   Patient presents with    6 Month Follow-Up       HPI:     HPI    Well Adult Physical: Patient here for a comprehensive physical exam.The patient reports all chronic issues are well controlled on current medications. Do you take any herbs or supplements that were not prescribed by a doctor? no Are you taking calcium supplements? no Are you taking aspirin daily? no   History:  Any STD's in the past? none    Occasional tinnitus becoming more frequent and louder. He is willing to return for hearing test to reassess. Sometimes associated with sore throat and PND. Better on daily xyzal and saline nasal spray. Hypertension and hyperlipidemia are under good control but he has now also gone through heart cath and stent placement. He is feeling well and has lost weight and now exercising daily. Sinus pressure comes and goes all year. Now also having headaches in the evening. Left eye pressure occasional, tylenol and motrin helps. Occasional blood in the mucus from the nose. Taking xyzal daily in the worst seasons. GERD controlled on PPI. UC being followed by GI. Wants to wean off PPI again this spring. Lower pressure with urination. ED happening more. Reviewed chart forpast medical history , surgical history , allergies, social history , family history and medications.     Health Maintenance   Topic Date Due    AAA screen  Never done    Flu vaccine (1) 08/01/2023    Lipids  01/25/2024    Depression Screen  01/31/2024    Annual Wellness Visit (AWV)  02/01/2024    DTaP/Tdap/Td vaccine (2 - Td or Tdap) 11/22/2026    Colorectal Cancer Screen  03/29/2033    Shingles vaccine  Completed    Pneumococcal 65+ years Vaccine

## 2023-07-31 ENCOUNTER — OFFICE VISIT (OUTPATIENT)
Dept: FAMILY MEDICINE CLINIC | Age: 68
End: 2023-07-31
Payer: MEDICARE

## 2023-07-31 VITALS
RESPIRATION RATE: 20 BRPM | SYSTOLIC BLOOD PRESSURE: 110 MMHG | BODY MASS INDEX: 29.86 KG/M2 | WEIGHT: 185 LBS | OXYGEN SATURATION: 98 % | DIASTOLIC BLOOD PRESSURE: 70 MMHG | HEART RATE: 68 BPM | TEMPERATURE: 97 F

## 2023-07-31 DIAGNOSIS — B35.1 ONYCHOMYCOSIS: ICD-10-CM

## 2023-07-31 DIAGNOSIS — M15.9 PRIMARY OSTEOARTHRITIS INVOLVING MULTIPLE JOINTS: ICD-10-CM

## 2023-07-31 DIAGNOSIS — G44.019 EPISODIC CLUSTER HEADACHE, NOT INTRACTABLE: ICD-10-CM

## 2023-07-31 DIAGNOSIS — K21.9 GASTROESOPHAGEAL REFLUX DISEASE WITHOUT ESOPHAGITIS: ICD-10-CM

## 2023-07-31 DIAGNOSIS — I25.10 CORONARY ARTERY DISEASE INVOLVING NATIVE CORONARY ARTERY OF NATIVE HEART WITHOUT ANGINA PECTORIS: ICD-10-CM

## 2023-07-31 DIAGNOSIS — J30.1 CHRONIC SEASONAL ALLERGIC RHINITIS DUE TO POLLEN: ICD-10-CM

## 2023-07-31 DIAGNOSIS — E87.0 HYPERNATREMIA: ICD-10-CM

## 2023-07-31 DIAGNOSIS — K51.80 OTHER ULCERATIVE COLITIS WITHOUT COMPLICATION (HCC): ICD-10-CM

## 2023-07-31 DIAGNOSIS — R35.0 URINARY FREQUENCY: ICD-10-CM

## 2023-07-31 DIAGNOSIS — E78.00 PURE HYPERCHOLESTEROLEMIA: ICD-10-CM

## 2023-07-31 DIAGNOSIS — N52.9 ERECTILE DYSFUNCTION, UNSPECIFIED ERECTILE DYSFUNCTION TYPE: ICD-10-CM

## 2023-07-31 DIAGNOSIS — I20.8 ANGINAL EQUIVALENT (HCC): ICD-10-CM

## 2023-07-31 DIAGNOSIS — I48.3 TYPICAL ATRIAL FLUTTER (HCC): ICD-10-CM

## 2023-07-31 DIAGNOSIS — I45.10 RBBB: ICD-10-CM

## 2023-07-31 DIAGNOSIS — I20.9 ANGINA, CLASS III (HCC): ICD-10-CM

## 2023-07-31 DIAGNOSIS — I10 ESSENTIAL HYPERTENSION: Primary | ICD-10-CM

## 2023-07-31 DIAGNOSIS — R00.2 PALPITATION: ICD-10-CM

## 2023-07-31 DIAGNOSIS — I44.30 AV BLOCK: ICD-10-CM

## 2023-07-31 PROCEDURE — 3017F COLORECTAL CA SCREEN DOC REV: CPT | Performed by: FAMILY MEDICINE

## 2023-07-31 PROCEDURE — 1123F ACP DISCUSS/DSCN MKR DOCD: CPT | Performed by: FAMILY MEDICINE

## 2023-07-31 PROCEDURE — 99214 OFFICE O/P EST MOD 30 MIN: CPT | Performed by: FAMILY MEDICINE

## 2023-07-31 PROCEDURE — 3078F DIAST BP <80 MM HG: CPT | Performed by: FAMILY MEDICINE

## 2023-07-31 PROCEDURE — G8427 DOCREV CUR MEDS BY ELIG CLIN: HCPCS | Performed by: FAMILY MEDICINE

## 2023-07-31 PROCEDURE — 3074F SYST BP LT 130 MM HG: CPT | Performed by: FAMILY MEDICINE

## 2023-07-31 PROCEDURE — G8417 CALC BMI ABV UP PARAM F/U: HCPCS | Performed by: FAMILY MEDICINE

## 2023-07-31 PROCEDURE — 4004F PT TOBACCO SCREEN RCVD TLK: CPT | Performed by: FAMILY MEDICINE

## 2023-07-31 SDOH — ECONOMIC STABILITY: HOUSING INSECURITY
IN THE LAST 12 MONTHS, WAS THERE A TIME WHEN YOU DID NOT HAVE A STEADY PLACE TO SLEEP OR SLEPT IN A SHELTER (INCLUDING NOW)?: NO

## 2023-07-31 SDOH — ECONOMIC STABILITY: FOOD INSECURITY: WITHIN THE PAST 12 MONTHS, YOU WORRIED THAT YOUR FOOD WOULD RUN OUT BEFORE YOU GOT MONEY TO BUY MORE.: NEVER TRUE

## 2023-07-31 SDOH — ECONOMIC STABILITY: FOOD INSECURITY: WITHIN THE PAST 12 MONTHS, THE FOOD YOU BOUGHT JUST DIDN'T LAST AND YOU DIDN'T HAVE MONEY TO GET MORE.: NEVER TRUE

## 2023-07-31 SDOH — ECONOMIC STABILITY: INCOME INSECURITY: HOW HARD IS IT FOR YOU TO PAY FOR THE VERY BASICS LIKE FOOD, HOUSING, MEDICAL CARE, AND HEATING?: NOT HARD AT ALL

## 2023-09-13 ENCOUNTER — OFFICE VISIT (OUTPATIENT)
Dept: CARDIOLOGY CLINIC | Age: 68
End: 2023-09-13
Payer: MEDICARE

## 2023-09-13 VITALS
HEIGHT: 66 IN | WEIGHT: 189 LBS | HEART RATE: 60 BPM | SYSTOLIC BLOOD PRESSURE: 136 MMHG | BODY MASS INDEX: 30.37 KG/M2 | DIASTOLIC BLOOD PRESSURE: 88 MMHG

## 2023-09-13 DIAGNOSIS — I25.83 CORONARY ARTERY DISEASE DUE TO LIPID RICH PLAQUE: Primary | ICD-10-CM

## 2023-09-13 DIAGNOSIS — I25.10 CORONARY ARTERY DISEASE DUE TO LIPID RICH PLAQUE: Primary | ICD-10-CM

## 2023-09-13 PROCEDURE — 4004F PT TOBACCO SCREEN RCVD TLK: CPT | Performed by: INTERNAL MEDICINE

## 2023-09-13 PROCEDURE — 3017F COLORECTAL CA SCREEN DOC REV: CPT | Performed by: INTERNAL MEDICINE

## 2023-09-13 PROCEDURE — 99214 OFFICE O/P EST MOD 30 MIN: CPT | Performed by: INTERNAL MEDICINE

## 2023-09-13 PROCEDURE — G8417 CALC BMI ABV UP PARAM F/U: HCPCS | Performed by: INTERNAL MEDICINE

## 2023-09-13 PROCEDURE — G8427 DOCREV CUR MEDS BY ELIG CLIN: HCPCS | Performed by: INTERNAL MEDICINE

## 2023-09-13 PROCEDURE — 3075F SYST BP GE 130 - 139MM HG: CPT | Performed by: INTERNAL MEDICINE

## 2023-09-13 PROCEDURE — 3079F DIAST BP 80-89 MM HG: CPT | Performed by: INTERNAL MEDICINE

## 2023-09-13 PROCEDURE — 1123F ACP DISCUSS/DSCN MKR DOCD: CPT | Performed by: INTERNAL MEDICINE

## 2023-09-13 RX ORDER — RIVAROXABAN 20 MG/1
20 TABLET, FILM COATED ORAL
Qty: 90 TABLET | Refills: 1 | OUTPATIENT
Start: 2023-09-13

## 2023-09-13 NOTE — PROGRESS NOTES
Pt her for check up     Pt is wondering if he is to continue Xarelto? Pt states Dr Nereida Vazquez had mentioned that.

## 2023-09-13 NOTE — PROGRESS NOTES
[facility] [address]  Dept: [phone]  Dept Fax: [phone]  Loc: [phone]    Visit Date: 9/13/2023    Mr. Baird Bernheim is a 78 y.o. male  who presented for:  Chief Complaint   Patient presents with    Follow-up       HPI:   79 yo M c hx of CAD s/p PCI of LAD of 12/22/20, HTN, GERD, HLD, MELVIN on CPAP, Ulcerative colitis is here for a follow up. He had PPM placed  on 11/25/22. He is doing well. He denies any symptoms at that time. Current Outpatient Medications:     rivaroxaban (XARELTO) 20 MG TABS tablet, Take 1 tablet by mouth daily (with breakfast), Disp: 90 tablet, Rfl: 1    losartan (COZAAR) 50 MG tablet, Take 1 tablet by mouth daily, Disp: 90 tablet, Rfl: 3    atorvastatin (LIPITOR) 40 MG tablet, Take 1 tablet by mouth daily, Disp: 90 tablet, Rfl: 3    omeprazole (PRILOSEC) 20 MG delayed release capsule, Take 1 capsule by mouth daily, Disp: , Rfl:     nitroGLYCERIN (NITROSTAT) 0.4 MG SL tablet, up to max of 3 total doses. If no relief after 1 dose, call 911., Disp: 25 tablet, Rfl: 3    metoprolol tartrate (LOPRESSOR) 25 MG tablet, Take 0.5 tablets by mouth 2 times daily, Disp: 90 tablet, Rfl: 3    NONFORMULARY, Saline spray as needed, Disp: , Rfl:     CPAP Machine MISC, by Does not apply route Change to CPAP pressure 9 cm H2O.  Increase EPR to 3 DL in 2 weeks, Disp: 1 each, Rfl: 0    multivitamin (THERAGRAN) per tablet, Take 1 tablet by mouth daily, Disp: , Rfl:     aspirin 81 MG EC tablet, Take 1 tablet by mouth daily, Disp: , Rfl:     Acetaminophen (TYLENOL ARTHRITIS PAIN PO), Take by mouth, Disp: , Rfl:     Past Medical History  Hossein Mercado  has a past medical history of Atrial flutter ([address]), Calcium oxalate renal stones, Coronary artery disease involving native coronary artery of native heart without angina pectoris, Essential hypertension, GERD (gastroesophageal reflux disease), Hyperlipidemia, Osteoarthritis,

## 2023-09-21 ENCOUNTER — PROCEDURE VISIT (OUTPATIENT)
Dept: CARDIOLOGY CLINIC | Age: 68
End: 2023-09-21

## 2023-09-21 DIAGNOSIS — Z95.0 PACEMAKER: Primary | ICD-10-CM

## 2023-09-21 NOTE — PROGRESS NOTES
[facility]   Patient of Nallu    Battery 13.3 years    Presenting rhythm AS     A Impedance 361  RV Impedance 532    P wave sensing 1.1  R wave sensing >20    A Threshold 0.5 @ 0.40  A Amplitude 1.50 @ 0.40  RV Thresholds 0.875 @ 0.40  RV Amplitude 2.0 @ 0.40      A Paced 26.7%  V Paced 24.2%    Programmed Mode AAIR <=> DDDR     Hx AF- taking xarelto  Afib [place] <0.1%    Episodes   AF

## 2023-12-26 PROCEDURE — 93294 REM INTERROG EVL PM/LDLS PM: CPT | Performed by: INTERNAL MEDICINE

## 2023-12-26 PROCEDURE — 93296 REM INTERROG EVL PM/IDS: CPT | Performed by: INTERNAL MEDICINE

## 2023-12-27 ENCOUNTER — PROCEDURE VISIT (OUTPATIENT)
Dept: CARDIOLOGY CLINIC | Age: 68
End: 2023-12-27
Payer: MEDICARE

## 2023-12-27 DIAGNOSIS — Z95.0 PACEMAKER: Primary | ICD-10-CM

## 2023-12-27 NOTE — PROGRESS NOTES
[employer] medtronic dual pacer     . [facility] Battery longevity:  12.9 years on device   Presenting rhythm  AS VS     Atrial impedance 342  RV impedance 475    P wave sensing 0.5  R wave sensing 20    29.4 % atrial paced  38 % RV paced     Atrial threshold 0.5 V  at 0.4ms  RV threshold 0.875 V at 0.4ms  Mode switches   known PAF/xarelto

## 2024-01-24 ENCOUNTER — NURSE ONLY (OUTPATIENT)
Dept: LAB | Age: 69
End: 2024-01-24

## 2024-01-24 DIAGNOSIS — I48.3 TYPICAL ATRIAL FLUTTER (HCC): ICD-10-CM

## 2024-01-24 DIAGNOSIS — E78.00 PURE HYPERCHOLESTEROLEMIA: ICD-10-CM

## 2024-01-24 DIAGNOSIS — K21.9 GASTROESOPHAGEAL REFLUX DISEASE WITHOUT ESOPHAGITIS: ICD-10-CM

## 2024-01-24 DIAGNOSIS — N52.9 ERECTILE DYSFUNCTION, UNSPECIFIED ERECTILE DYSFUNCTION TYPE: ICD-10-CM

## 2024-01-24 LAB
ALBUMIN SERPL BCG-MCNC: 4.3 G/DL (ref 3.5–5.1)
ALP SERPL-CCNC: 69 U/L (ref 38–126)
ALT SERPL W/O P-5'-P-CCNC: 25 U/L (ref 11–66)
ANION GAP SERPL CALC-SCNC: 12 MEQ/L (ref 8–16)
AST SERPL-CCNC: 26 U/L (ref 5–40)
BILIRUB SERPL-MCNC: 0.7 MG/DL (ref 0.3–1.2)
BUN SERPL-MCNC: 20 MG/DL (ref 7–22)
CALCIUM SERPL-MCNC: 9.6 MG/DL (ref 8.5–10.5)
CHLORIDE SERPL-SCNC: 104 MEQ/L (ref 98–111)
CHOLEST SERPL-MCNC: 118 MG/DL (ref 100–199)
CO2 SERPL-SCNC: 28 MEQ/L (ref 23–33)
CREAT SERPL-MCNC: 0.9 MG/DL (ref 0.4–1.2)
DEPRECATED RDW RBC AUTO: 40.2 FL (ref 35–45)
ERYTHROCYTE [DISTWIDTH] IN BLOOD BY AUTOMATED COUNT: 11.9 % (ref 11.5–14.5)
GFR SERPL CREATININE-BSD FRML MDRD: > 60 ML/MIN/1.73M2
GLUCOSE FASTING: 92 MG/DL (ref 70–108)
HCT VFR BLD AUTO: 41.9 % (ref 42–52)
HDLC SERPL-MCNC: 47 MG/DL
HGB BLD-MCNC: 14.1 GM/DL (ref 14–18)
LDLC SERPL CALC-MCNC: 63 MG/DL
MCH RBC QN AUTO: 31.3 PG (ref 26–33)
MCHC RBC AUTO-ENTMCNC: 33.7 GM/DL (ref 32.2–35.5)
MCV RBC AUTO: 92.9 FL (ref 80–94)
PLATELET # BLD AUTO: 219 THOU/MM3 (ref 130–400)
PMV BLD AUTO: 8.8 FL (ref 9.4–12.4)
POTASSIUM SERPL-SCNC: 4.8 MEQ/L (ref 3.5–5.2)
PROT SERPL-MCNC: 6.7 G/DL (ref 6.1–8)
PSA SERPL-MCNC: 0.51 NG/ML (ref 0–1)
RBC # BLD AUTO: 4.51 MILL/MM3 (ref 4.7–6.1)
SODIUM SERPL-SCNC: 144 MEQ/L (ref 135–145)
TRIGL SERPL-MCNC: 39 MG/DL (ref 0–199)
TSH SERPL DL<=0.005 MIU/L-ACNC: 1.45 UIU/ML (ref 0.4–4.2)
WBC # BLD AUTO: 4.5 THOU/MM3 (ref 4.8–10.8)

## 2024-01-28 NOTE — PROGRESS NOTES
[facility]  [facility] MEDICINE  [address]  [address]  [facility] [address]  Dept: [phone]  Dept Fax: [phone]  Loc: [phone]    Freddy Daniel is a 68 y.o. male who presents today for:  Chief Complaint   Patient presents with    6 Month Follow-Up       HPI:     HPI    Well Adult Physical: Patient here for a comprehensive physical exam.The patient reports all chronic issues are well controlled on current medications.    Do you take any herbs or supplements that were not prescribed by a doctor? no Are you taking calcium supplements? no Are you taking aspirin daily? no   History:  Any STD's in the past? none    Occasional tinnitus becoming more frequent and louder.  He is willing to return for hearing test to reassess.  Sometimes associated with sore throat and PND.   Better on daily xyzal and saline nasal spray.     Hypertension and hyperlipidemia are under good control but he has now also gone through heart cath and stent placement.  He is feeling well and has lost weight and now exercising daily.      Sinus pressure comes and goes all year.  Now also having headaches in the evening.  Left eye pressure occasional, tylenol and motrin helps.  Occasional blood in the mucus from the nose.  Taking xyzal daily in the worst seasons and saline nasal spray prn.    GERD controlled on PPI.   UC being followed by GI.    Wants to wean off PPI again this spring.     Lower pressure with urination.   ED happening more.   Occasionally not emptying completely when sitting down to urinate.         Reviewed chart forpast medical history , surgical history , allergies, social history , family history and medications.    Health Maintenance   Topic Date Due    Respiratory Syncytial Virus (RSV) Pregnant or age 60 yrs+ (1 - 1-dose 60+ series) Never done    AAA screen  Never done    Depression Screen  01/31/2024    Annual Wellness Visit (Medicare)  02/01/2024    Lipids

## 2024-01-30 ENCOUNTER — OFFICE VISIT (OUTPATIENT)
Dept: FAMILY MEDICINE CLINIC | Age: 69
End: 2024-01-30

## 2024-01-30 VITALS
OXYGEN SATURATION: 98 % | DIASTOLIC BLOOD PRESSURE: 66 MMHG | BODY MASS INDEX: 30.74 KG/M2 | WEIGHT: 190.48 LBS | RESPIRATION RATE: 18 BRPM | HEART RATE: 61 BPM | SYSTOLIC BLOOD PRESSURE: 110 MMHG | TEMPERATURE: 97 F

## 2024-01-30 DIAGNOSIS — K51.80 OTHER ULCERATIVE COLITIS WITHOUT COMPLICATION (HCC): ICD-10-CM

## 2024-01-30 DIAGNOSIS — E78.00 PURE HYPERCHOLESTEROLEMIA: ICD-10-CM

## 2024-01-30 DIAGNOSIS — G44.019 EPISODIC CLUSTER HEADACHE, NOT INTRACTABLE: ICD-10-CM

## 2024-01-30 DIAGNOSIS — I48.3 TYPICAL ATRIAL FLUTTER (HCC): ICD-10-CM

## 2024-01-30 DIAGNOSIS — I44.30 AV BLOCK: ICD-10-CM

## 2024-01-30 DIAGNOSIS — I25.10 CORONARY ARTERY DISEASE INVOLVING NATIVE CORONARY ARTERY OF NATIVE HEART WITHOUT ANGINA PECTORIS: ICD-10-CM

## 2024-01-30 DIAGNOSIS — K21.9 GASTROESOPHAGEAL REFLUX DISEASE WITHOUT ESOPHAGITIS: ICD-10-CM

## 2024-01-30 DIAGNOSIS — I20.9 ANGINA, CLASS III (HCC): ICD-10-CM

## 2024-01-30 DIAGNOSIS — B35.1 ONYCHOMYCOSIS: ICD-10-CM

## 2024-01-30 DIAGNOSIS — J30.1 CHRONIC SEASONAL ALLERGIC RHINITIS DUE TO POLLEN: ICD-10-CM

## 2024-01-30 DIAGNOSIS — I10 ESSENTIAL HYPERTENSION: Primary | ICD-10-CM

## 2024-01-30 DIAGNOSIS — R35.0 URINARY FREQUENCY: ICD-10-CM

## 2024-01-30 DIAGNOSIS — M15.9 PRIMARY OSTEOARTHRITIS INVOLVING MULTIPLE JOINTS: ICD-10-CM

## 2024-01-30 DIAGNOSIS — N52.9 ERECTILE DYSFUNCTION, UNSPECIFIED ERECTILE DYSFUNCTION TYPE: ICD-10-CM

## 2024-01-30 DIAGNOSIS — I20.8 ANGINAL EQUIVALENT: ICD-10-CM

## 2024-01-30 RX ORDER — LOSARTAN POTASSIUM 50 MG/1
TABLET ORAL
Qty: 90 TABLET | Refills: 3 | Status: SHIPPED | OUTPATIENT
Start: 2024-01-30

## 2024-01-30 RX ORDER — ATORVASTATIN CALCIUM 40 MG/1
40 TABLET, FILM COATED ORAL DAILY
Qty: 90 TABLET | Refills: 3 | Status: SHIPPED | OUTPATIENT
Start: 2024-01-30

## 2024-01-30 ASSESSMENT — PATIENT HEALTH QUESTIONNAIRE - PHQ9
1. LITTLE INTEREST OR PLEASURE IN DOING THINGS: 0
SUM OF ALL RESPONSES TO PHQ QUESTIONS 1-9: 0
SUM OF ALL RESPONSES TO PHQ QUESTIONS 1-9: 0
SUM OF ALL RESPONSES TO PHQ9 QUESTIONS 1 & 2: 0
2. FEELING DOWN, DEPRESSED OR HOPELESS: 0
SUM OF ALL RESPONSES TO PHQ QUESTIONS 1-9: 0

## 2024-02-23 NOTE — PROGRESS NOTES
10.0 years (10.0 ttl pk-yrs)     Types: Cigarettes     Start date: 3/29/1978     Quit date: 3/29/1988     Years since quittin.9    Smokeless tobacco: Former   Vaping Use    Vaping Use: Never used   Substance Use Topics    Alcohol use: Yes     Comment: 2 to 3 weekly    Drug use: No     ALLERGIES:No Known Allergies  FAMILY HISTORY:  Family History   Problem Relation Age of Onset    Cancer Mother         NHL    No Known Problems Father     Kidney Disease Sister     Heart Disease Sister     Heart Disease Maternal Grandmother         CAD    Diabetes Maternal Grandfather     Heart Disease Paternal Grandfather         MI    Cancer Paternal Grandfather         PROSTATE     CURRENT MEDICATIONS:  Current Outpatient Medications   Medication Sig Dispense Refill    atorvastatin (LIPITOR) 40 MG tablet Take 1 tablet by mouth daily 90 tablet 3    losartan (COZAAR) 50 MG tablet Take 1 tablet by mouth daily 90 tablet 3    rivaroxaban (XARELTO) 20 MG TABS tablet Take 1 tablet by mouth daily (with breakfast) 90 tablet 3    metoprolol tartrate (LOPRESSOR) 25 MG tablet Take 0.5 tablets by mouth 2 times daily 90 tablet 3    omeprazole (PRILOSEC) 20 MG delayed release capsule Take 1 capsule by mouth daily      nitroGLYCERIN (NITROSTAT) 0.4 MG SL tablet up to max of 3 total doses. If no relief after 1 dose, call 911. 25 tablet 3    Acetaminophen (TYLENOL ARTHRITIS PAIN PO) Take by mouth      NONFORMULARY Saline spray as needed      CPAP Machine MISC by Does not apply route Change to CPAP pressure 9 cm H2O. Increase EPR to 3  DL in 2 weeks 1 each 0    multivitamin (THERAGRAN) per tablet Take 1 tablet by mouth daily      aspirin 81 MG EC tablet Take 1 tablet by mouth daily       No current facility-administered medications for this visit.       Review of systems     Review of Systems   Constitutional: Negative.    HENT:  Positive for congestion and postnasal drip. Negative for rhinorrhea.         Nasal saline spray prn   Respiratory:

## 2024-02-26 ENCOUNTER — OFFICE VISIT (OUTPATIENT)
Dept: PULMONOLOGY | Age: 69
End: 2024-02-26
Payer: MEDICARE

## 2024-02-26 VITALS
BODY MASS INDEX: 31.43 KG/M2 | HEIGHT: 66 IN | OXYGEN SATURATION: 99 % | HEART RATE: 62 BPM | SYSTOLIC BLOOD PRESSURE: 118 MMHG | WEIGHT: 195.6 LBS | TEMPERATURE: 98.9 F | DIASTOLIC BLOOD PRESSURE: 74 MMHG

## 2024-02-26 DIAGNOSIS — E66.9 OBESITY (BMI 30-39.9): ICD-10-CM

## 2024-02-26 DIAGNOSIS — G47.33 OSA ON CPAP: Primary | ICD-10-CM

## 2024-02-26 PROCEDURE — G8427 DOCREV CUR MEDS BY ELIG CLIN: HCPCS

## 2024-02-26 PROCEDURE — G8417 CALC BMI ABV UP PARAM F/U: HCPCS

## 2024-02-26 PROCEDURE — 99214 OFFICE O/P EST MOD 30 MIN: CPT

## 2024-02-26 PROCEDURE — 3017F COLORECTAL CA SCREEN DOC REV: CPT

## 2024-02-26 PROCEDURE — 3074F SYST BP LT 130 MM HG: CPT

## 2024-02-26 PROCEDURE — 1036F TOBACCO NON-USER: CPT

## 2024-02-26 PROCEDURE — 1123F ACP DISCUSS/DSCN MKR DOCD: CPT

## 2024-02-26 PROCEDURE — 3078F DIAST BP <80 MM HG: CPT

## 2024-02-26 PROCEDURE — G8482 FLU IMMUNIZE ORDER/ADMIN: HCPCS

## 2024-02-26 ASSESSMENT — ENCOUNTER SYMPTOMS
RHINORRHEA: 0
SHORTNESS OF BREATH: 0
WHEEZING: 0
COUGH: 0

## 2024-02-26 NOTE — PATIENT INSTRUCTIONS
Continue current positive airway pressure therapy.    Keep good compliance with current recommended pressure to get optimal results and clinical improvement    Make sure to get 7-9 hours of sleep with PAP    Call DME company regarding supplies if needed.     Call office for earlier appointment if needed for worsening of sleep symptoms.     Do not drive if feeling sleepy    Make sure to work on weight loss

## 2024-03-18 ENCOUNTER — NURSE ONLY (OUTPATIENT)
Dept: CARDIOLOGY CLINIC | Age: 69
End: 2024-03-18
Payer: MEDICARE

## 2024-03-18 DIAGNOSIS — Z95.0 PACEMAKER: Primary | ICD-10-CM

## 2024-03-18 PROCEDURE — 93280 PM DEVICE PROGR EVAL DUAL: CPT | Performed by: INTERNAL MEDICINE

## 2024-03-18 NOTE — PROGRESS NOTES
DR [facility] PT   MEDTRONIC DUAL PACER REMOTE   BATTERY 12.7 YRS REMAINING    KNOW AFIB /XARELTO   AFIB BURDEN <0.1%    AAIR<=>DDDR   PRESENTS IN ASVP 64  UNDERLYING ASVS 71      A PACED 28.8%  V PACEDD 31.9%  P WAVES 1.3  RV WAVES >20    ATRIAL IMPEDENCE 361  VENT IMPEDENCE 475    ATRIAL THRESHOLD 0.75 @ 0.4  VENT THRESHOLD 0.75 @ 0.4    ATRIAL AMPLITUDE 1.5 @ 0.4  VENT AMPLITUDE 2 @ 0.4

## 2024-05-02 ENCOUNTER — HOSPITAL ENCOUNTER (OUTPATIENT)
Dept: MRI IMAGING | Age: 69
Discharge: HOME | End: 2024-05-02
Payer: MEDICARE

## 2024-05-02 DIAGNOSIS — M75.102 TEAR OF LEFT ROTATOR CUFF, UNSPECIFIED TEAR EXTENT, UNSPECIFIED WHETHER TRAUMATIC: ICD-10-CM

## 2024-05-02 PROCEDURE — 73221 MRI JOINT UPR EXTREM W/O DYE: CPT

## 2024-06-18 RX ORDER — NITROGLYCERIN 0.4 MG/1
TABLET SUBLINGUAL
Qty: 25 TABLET | Refills: 3 | Status: SHIPPED | OUTPATIENT
Start: 2024-06-18

## 2024-06-24 ENCOUNTER — PROCEDURE VISIT (OUTPATIENT)
Dept: CARDIOLOGY CLINIC | Age: 69
End: 2024-06-24

## 2024-06-24 DIAGNOSIS — Z95.0 PACEMAKER: Primary | ICD-10-CM

## 2024-06-24 NOTE — PROGRESS NOTES
Dr patel pt   Medtronic dual pacer remote  Battery 12.3 yrs remaining    Aair<=>dddr   A paced 31.4%  V  paced 34.7%    P waves 1.8  Rv waves >20    Atrial impedence 361  Vent impedence 456    Atrial threshold 0.625 @ 0.4  Vent threshold 0.75 @ 0.4    Atrial amplitude 1.5 @ 0.4  Vent amplitude 2 @ 0.4      Afib burden 0.2%    Known afib/ xarelto

## 2024-08-06 ENCOUNTER — OFFICE VISIT (OUTPATIENT)
Dept: FAMILY MEDICINE CLINIC | Age: 69
End: 2024-08-06

## 2024-08-06 VITALS
WEIGHT: 195.33 LBS | OXYGEN SATURATION: 98 % | SYSTOLIC BLOOD PRESSURE: 90 MMHG | HEIGHT: 66 IN | TEMPERATURE: 97.3 F | BODY MASS INDEX: 31.39 KG/M2 | RESPIRATION RATE: 16 BRPM | HEART RATE: 69 BPM | DIASTOLIC BLOOD PRESSURE: 56 MMHG

## 2024-08-06 DIAGNOSIS — I20.9 ANGINA, CLASS III (HCC): ICD-10-CM

## 2024-08-06 DIAGNOSIS — I25.10 CORONARY ARTERY DISEASE INVOLVING NATIVE CORONARY ARTERY OF NATIVE HEART WITHOUT ANGINA PECTORIS: ICD-10-CM

## 2024-08-06 DIAGNOSIS — I44.30 AV BLOCK: ICD-10-CM

## 2024-08-06 DIAGNOSIS — K51.80 OTHER ULCERATIVE COLITIS WITHOUT COMPLICATION (HCC): ICD-10-CM

## 2024-08-06 DIAGNOSIS — I48.3 TYPICAL ATRIAL FLUTTER (HCC): ICD-10-CM

## 2024-08-06 DIAGNOSIS — G44.019 EPISODIC CLUSTER HEADACHE, NOT INTRACTABLE: ICD-10-CM

## 2024-08-06 DIAGNOSIS — I20.89 ANGINAL EQUIVALENT (HCC): ICD-10-CM

## 2024-08-06 DIAGNOSIS — I45.10 RBBB: ICD-10-CM

## 2024-08-06 DIAGNOSIS — I10 ESSENTIAL HYPERTENSION: ICD-10-CM

## 2024-08-06 DIAGNOSIS — J30.1 CHRONIC SEASONAL ALLERGIC RHINITIS DUE TO POLLEN: ICD-10-CM

## 2024-08-06 DIAGNOSIS — E78.00 PURE HYPERCHOLESTEROLEMIA: ICD-10-CM

## 2024-08-06 DIAGNOSIS — K21.9 GASTROESOPHAGEAL REFLUX DISEASE WITHOUT ESOPHAGITIS: ICD-10-CM

## 2024-08-06 DIAGNOSIS — B35.1 ONYCHOMYCOSIS: ICD-10-CM

## 2024-08-06 DIAGNOSIS — M15.9 PRIMARY OSTEOARTHRITIS INVOLVING MULTIPLE JOINTS: ICD-10-CM

## 2024-08-06 DIAGNOSIS — Z00.00 MEDICARE ANNUAL WELLNESS VISIT, SUBSEQUENT: Primary | ICD-10-CM

## 2024-08-06 DIAGNOSIS — E87.0 HYPERNATREMIA: ICD-10-CM

## 2024-08-06 DIAGNOSIS — I48.20 CHRONIC ATRIAL FIBRILLATION, UNSPECIFIED (HCC): ICD-10-CM

## 2024-08-06 DIAGNOSIS — N52.9 ERECTILE DYSFUNCTION, UNSPECIFIED ERECTILE DYSFUNCTION TYPE: ICD-10-CM

## 2024-08-06 DIAGNOSIS — R35.0 URINARY FREQUENCY: ICD-10-CM

## 2024-08-06 SDOH — ECONOMIC STABILITY: INCOME INSECURITY: HOW HARD IS IT FOR YOU TO PAY FOR THE VERY BASICS LIKE FOOD, HOUSING, MEDICAL CARE, AND HEATING?: NOT HARD AT ALL

## 2024-08-06 SDOH — ECONOMIC STABILITY: FOOD INSECURITY: WITHIN THE PAST 12 MONTHS, THE FOOD YOU BOUGHT JUST DIDN'T LAST AND YOU DIDN'T HAVE MONEY TO GET MORE.: NEVER TRUE

## 2024-08-06 SDOH — ECONOMIC STABILITY: FOOD INSECURITY: WITHIN THE PAST 12 MONTHS, YOU WORRIED THAT YOUR FOOD WOULD RUN OUT BEFORE YOU GOT MONEY TO BUY MORE.: NEVER TRUE

## 2024-08-06 ASSESSMENT — PATIENT HEALTH QUESTIONNAIRE - PHQ9
2. FEELING DOWN, DEPRESSED OR HOPELESS: NOT AT ALL
SUM OF ALL RESPONSES TO PHQ QUESTIONS 1-9: 0
SUM OF ALL RESPONSES TO PHQ9 QUESTIONS 1 & 2: 0
SUM OF ALL RESPONSES TO PHQ QUESTIONS 1-9: 0
1. LITTLE INTEREST OR PLEASURE IN DOING THINGS: NOT AT ALL

## 2024-08-06 ASSESSMENT — LIFESTYLE VARIABLES
HOW MANY STANDARD DRINKS CONTAINING ALCOHOL DO YOU HAVE ON A TYPICAL DAY: 1 OR 2
HOW OFTEN DO YOU HAVE A DRINK CONTAINING ALCOHOL: 2-3 TIMES A WEEK

## 2024-08-06 NOTE — PROGRESS NOTES
[facility]  [facility] MEDICINE  [address]  [address]  [facility] [address]  Dept: [phone]  Dept Fax: [phone]  Loc: [phone]    Freddy Daniel is a 68 y.o. male who presents today for:  No chief complaint on file.      HPI:     HPI    Well Adult Physical: Patient here for a comprehensive physical exam.The patient reports all chronic issues are well controlled on current medications.    Do you take any herbs or supplements that were not prescribed by a doctor? no Are you taking calcium supplements? no Are you taking aspirin daily? no   History:  Any STD's in the past? none    Occasional tinnitus becoming more frequent and louder.  He is willing to return for hearing test to reassess.  Sometimes associated with sore throat and PND.   Better on daily xyzal and saline nasal spray. ***    Hypertension and hyperlipidemia are under good control but he has now also gone through heart cath and stent placement.  He is feeling well and has lost weight and now exercising daily.  ***    Sinus pressure comes and goes all year.  Now also having headaches in the evening.  Left eye pressure occasional, tylenol and motrin helps.  Occasional blood in the mucus from the nose.  Taking xyzal daily in the worst seasons and saline nasal spray prn. ***    GERD controlled on PPI.   UC being followed by GI.    Wants to wean off PPI again this spring.  ***    Lower pressure with urination.   ED happening more.   Occasionally not emptying completely when sitting down to urinate.  ***        Reviewed chart forpast medical history , surgical history , allergies, social history , family history and medications.    Health Maintenance   Topic Date Due    Respiratory Syncytial Virus (RSV) Pregnant or age 60 yrs+ (1 - 1-dose 60+ series) Never done    AAA screen  Never done    Annual Wellness Visit (Medicare)  02/01/2024    Flu vaccine (1) 08/01/2024    Lipids  01/24/2025    Depression 
(LIPITOR) 40 MG tablet Take 1 tablet by mouth daily Yes Eve Wyman MD   losartan (COZAAR) 50 MG tablet Take 1 tablet by mouth daily Yes Eve Wyman MD   metoprolol tartrate (LOPRESSOR) 25 MG tablet Take 0.5 tablets by mouth 2 times daily Yes Antolin Kumar MD   omeprazole (PRILOSEC) 20 MG delayed release capsule Take 1 capsule by mouth daily Yes Juancho Wesley MD   Acetaminophen (TYLENOL ARTHRITIS PAIN PO) Take by mouth Yes Juancho Wesley MD   NONFORMULARY Saline spray as needed Yes Juancho Wesley MD   CPAP Machine MISC by Does not apply route Change to CPAP pressure 9 cm H2O. Increase EPR to 3  DL in 2 weeks Yes Rochelle Cardoso APRN - CNP   multivitamin (THERAGRAN) per tablet Take 1 tablet by mouth daily Yes Juancho Wesley MD   aspirin 81 MG EC tablet Take 1 tablet by mouth daily Yes Juancho Wesley MD       CareTeam (Including outside providers/suppliers regularly involved in providing care):   Patient Care Team:  Eve Wyman MD as PCP - General  Eve Wyman MD as PCP - Empaneled Provider  Pasquale Montana MD as Consulting Physician (Gastroenterology)  Randy Brewer APRN - CNP (Nurse Practitioner)  Antolin Kumar MD as Cardiologist (Cardiology)      Reviewed and updated this visit:  Tobacco  Allergies  Meds  Med Hx  Surg Hx  Soc Hx  Fam Hx

## 2024-08-30 NOTE — PATIENT INSTRUCTIONS
Your Provider for Today: Dr. Kumar  Your nurses for today: Darya    You may receive a survey regarding the care you received during your visit.  Your input is valuable to us.  We encourage you to complete and return your survey.  We hope you will choose us in the future for your healthcare needs.

## 2024-09-04 ENCOUNTER — OFFICE VISIT (OUTPATIENT)
Dept: CARDIOLOGY CLINIC | Age: 69
End: 2024-09-04
Payer: MEDICARE

## 2024-09-04 VITALS
HEART RATE: 53 BPM | DIASTOLIC BLOOD PRESSURE: 80 MMHG | BODY MASS INDEX: 30.66 KG/M2 | HEIGHT: 65 IN | SYSTOLIC BLOOD PRESSURE: 122 MMHG | WEIGHT: 184 LBS

## 2024-09-04 DIAGNOSIS — I25.10 CORONARY ARTERY DISEASE DUE TO LIPID RICH PLAQUE: Primary | ICD-10-CM

## 2024-09-04 DIAGNOSIS — I25.83 CORONARY ARTERY DISEASE DUE TO LIPID RICH PLAQUE: Primary | ICD-10-CM

## 2024-09-04 PROCEDURE — 93000 ELECTROCARDIOGRAM COMPLETE: CPT | Performed by: INTERNAL MEDICINE

## 2024-09-04 PROCEDURE — 3079F DIAST BP 80-89 MM HG: CPT | Performed by: INTERNAL MEDICINE

## 2024-09-04 PROCEDURE — 3017F COLORECTAL CA SCREEN DOC REV: CPT | Performed by: INTERNAL MEDICINE

## 2024-09-04 PROCEDURE — 1123F ACP DISCUSS/DSCN MKR DOCD: CPT | Performed by: INTERNAL MEDICINE

## 2024-09-04 PROCEDURE — 3074F SYST BP LT 130 MM HG: CPT | Performed by: INTERNAL MEDICINE

## 2024-09-04 PROCEDURE — G8417 CALC BMI ABV UP PARAM F/U: HCPCS | Performed by: INTERNAL MEDICINE

## 2024-09-04 PROCEDURE — 99214 OFFICE O/P EST MOD 30 MIN: CPT | Performed by: INTERNAL MEDICINE

## 2024-09-04 PROCEDURE — 1036F TOBACCO NON-USER: CPT | Performed by: INTERNAL MEDICINE

## 2024-09-04 PROCEDURE — G8427 DOCREV CUR MEDS BY ELIG CLIN: HCPCS | Performed by: INTERNAL MEDICINE

## 2024-09-04 RX ORDER — METOPROLOL TARTRATE 25 MG/1
12.5 TABLET, FILM COATED ORAL 2 TIMES DAILY
Qty: 90 TABLET | Refills: 3 | Status: SHIPPED | OUTPATIENT
Start: 2024-09-04

## 2024-09-04 NOTE — PROGRESS NOTES
Pt here for 1 yr check up       Pt may have upcoming surgery rotator cuff with Dr. Washington     Pt continues with dizziness at times, notice if bending over,     EKG done today       Pt brought b/p log for review     
cm/s  TV Peak A-Wave: 41.1  IVRT: 106 msec         cm/s  MV E' Septal Velocity: 7.8  cm/s                                              TV Peak Gradient: 1.18  MV A' Septal Velocity: 11  AV DVI (Vmax):0.72     mmHg  cm/s  MV E' Lateral Velocity:                           PV Peak Velocity: 70.7  9.4 cm/s                                          cm/s  MV A' Lateral Velocity:                           PV Peak Gradient: 2 mmHg  13.2 cm/s  E/E' septal: 10.33  E/E' lateral: 8.57    [id number]  [email or web address]      STRESS:    CATH:    Assessment/Plan       Diagnosis Orders   1. Coronary artery disease due to lipid rich plaque  EKG 12 lead          Preop for L rotator cuff tear repair  High grade AVB s/p Dual chamber PPM  Aflutter on Xarelto  CAD s/p PCI on LAD 12/2020  UC  HTN  HLD  MELVIN on CPAP    S/p PPM  He is doing well,   Reviewed interrogation  On xarelto and aspirin  He thinks that Dr. Acosta suggested that he could get off xarelto  Reviewed notes.   No more symptoms.    Completed cardiac rehab  The patient is asked to make an attempt to improve diet and exercise patterns to aid in medical management of this problem.  Advised more plant based nutrition/meditarrean diet   Advised patient to call office or seek immediate medical attention if there is any new onset of  any chest pain, sob, palpitations, lightheadedness, dizziness, orthopnea, PND or pedal edema.   All medication side effects were discussed in details.    Thank youfor allowing me to participate in the care of this patient.   Please do not hesitate to contact me for any further questions.     Return in about 1 year (around 9/4/2025), or if symptoms worsen or fail to improve, for Review testing, Regular follow up.       Electronically signed by Antolin Kumar MD [facility]  9/4/2024 at 7:46 AM EDT

## 2024-10-01 PROCEDURE — 93296 REM INTERROG EVL PM/IDS: CPT | Performed by: INTERNAL MEDICINE

## 2024-10-01 PROCEDURE — 93294 REM INTERROG EVL PM/LDLS PM: CPT | Performed by: INTERNAL MEDICINE

## 2024-10-03 ENCOUNTER — TELEPHONE (OUTPATIENT)
Dept: CARDIOLOGY CLINIC | Age: 69
End: 2024-10-03

## 2024-10-03 NOTE — TELEPHONE ENCOUNTER
Pre op Risk Assessment    Procedure Left Shoulder Rotator Cuff Repair  Physician Dr. Washington  Date of surgery/procedure 10/31/2024    Last OV 09/04/2024  Last Stress 11/24/2020  Last Echo 12/22/2020  Last Cath 11/17/2022  Last Stent 12/22/2020  Is patient on blood thinners Aspirin and Xarelto  Hold Meds/how many days Please advise.        Fax: [phone]

## 2024-10-21 ENCOUNTER — LAB (OUTPATIENT)
Dept: LAB | Age: 69
End: 2024-10-21

## 2024-10-21 LAB
ANION GAP SERPL CALC-SCNC: 10 MEQ/L (ref 8–16)
BASOPHILS ABSOLUTE: 0.1 THOU/MM3 (ref 0–0.1)
BASOPHILS NFR BLD AUTO: 0.8 %
BUN SERPL-MCNC: 16 MG/DL (ref 7–22)
CALCIUM SERPL-MCNC: 9.4 MG/DL (ref 8.5–10.5)
CHLORIDE SERPL-SCNC: 103 MEQ/L (ref 98–111)
CO2 SERPL-SCNC: 28 MEQ/L (ref 23–33)
CREAT SERPL-MCNC: 0.7 MG/DL (ref 0.4–1.2)
DEPRECATED RDW RBC AUTO: 40.8 FL (ref 35–45)
EOSINOPHIL NFR BLD AUTO: 7.8 %
EOSINOPHILS ABSOLUTE: 0.5 THOU/MM3 (ref 0–0.4)
ERYTHROCYTE [DISTWIDTH] IN BLOOD BY AUTOMATED COUNT: 11.9 % (ref 11.5–14.5)
GFR SERPL CREATININE-BSD FRML MDRD: > 90 ML/MIN/1.73M2
GLUCOSE SERPL-MCNC: 89 MG/DL (ref 70–108)
HCT VFR BLD AUTO: 40.8 % (ref 42–52)
HGB BLD-MCNC: 13.8 GM/DL (ref 14–18)
IMM GRANULOCYTES # BLD AUTO: 0.01 THOU/MM3 (ref 0–0.07)
IMM GRANULOCYTES NFR BLD AUTO: 0.2 %
LYMPHOCYTES ABSOLUTE: 1.4 THOU/MM3 (ref 1–4.8)
LYMPHOCYTES NFR BLD AUTO: 22.4 %
MCH RBC QN AUTO: 31.6 PG (ref 26–33)
MCHC RBC AUTO-ENTMCNC: 33.8 GM/DL (ref 32.2–35.5)
MCV RBC AUTO: 93.4 FL (ref 80–94)
MONOCYTES ABSOLUTE: 0.5 THOU/MM3 (ref 0.4–1.3)
MONOCYTES NFR BLD AUTO: 7.8 %
NEUTROPHILS ABSOLUTE: 3.8 THOU/MM3 (ref 1.8–7.7)
NEUTROPHILS NFR BLD AUTO: 61 %
NRBC BLD AUTO-RTO: 0 /100 WBC
PLATELET # BLD AUTO: 255 THOU/MM3 (ref 130–400)
PMV BLD AUTO: 8.9 FL (ref 9.4–12.4)
POTASSIUM SERPL-SCNC: 4.3 MEQ/L (ref 3.5–5.2)
RBC # BLD AUTO: 4.37 MILL/MM3 (ref 4.7–6.1)
SODIUM SERPL-SCNC: 141 MEQ/L (ref 135–145)
WBC # BLD AUTO: 6.3 THOU/MM3 (ref 4.8–10.8)

## 2024-11-05 ENCOUNTER — HOSPITAL ENCOUNTER (OUTPATIENT)
Dept: OCCUPATIONAL THERAPY | Age: 69
Setting detail: THERAPIES SERIES
Discharge: HOME | End: 2024-11-05
Payer: MEDICARE

## 2024-11-05 PROCEDURE — 97110 THERAPEUTIC EXERCISES: CPT

## 2024-11-05 PROCEDURE — 97165 OT EVAL LOW COMPLEX 30 MIN: CPT

## 2024-11-05 NOTE — PROGRESS NOTES
* PLEASE SIGN, DATE AND TIME CERTIFICATION BELOW AND RETURN TO [facility] OUTPATIENT REHABILITATION (FAX #: [phone]).  ATTEST/CO-SIGN IF ACCESSING VIA [email or web address].  THANK YOU.**    I certify that I have examined the patient below and determined that Physical Medicine and Rehabilitation service is necessary and that I approve the established plan of care for up to 90 days or as specifically noted.  Attestation, signature or co-signature of physician indicates approval of certification requirements.    ________________________ ____________  Physician Signature   Date    [facility]  OCCUPATIONAL THERAPY  [x] EVALUATION  [] DAILY NOTE (LAND) [] DAILY NOTE (AQUATIC ) [] PROGRESS NOTE [] DISCHARGE NOTE    [] OUTPATIENT REHABILITATION CENTER [facility]   [] [facility] CARE [place]    [x] [facility]   [] [facility]    Date: 2024  Patient Name:  Freddy Daniel  : 1955  MRN: [id number]  CSN: [id number]    Referring Practitioner Low Washington MD [id number]      Diagnosis  Diagnoses       M75.102 (ICD-10-CM) - Unspecified rotator cuff tear or rupture of left shoulder, not specified as traumatic           Treatment Diagnosis M25.512  Left Shoulder Pain  M79.622  Pain in Left Upper Arm  M25.612 Stiffness of Left Shoulder   Date of Evaluation 24   Additional Pertinent History Freddy Daniel has a past medical history of Atrial flutter (HCC), Calcium oxalate renal stones, Coronary artery disease involving native coronary artery of native heart without angina pectoris, Essential hypertension, GERD (gastroesophageal reflux disease), Hyperlipidemia, Osteoarthritis, Palpitation, and Ulcerative colitis (HCC).  he has a past surgical history that includes Colonoscopy; Cardiac catheterization; Rotator cuff repair; Knee arthroscopy; Finger trigger release; Tonsillectomy; Insertable Cardiac Monitor (2021); Percutaneous Transluminal Coronary Angio (2020); and

## 2024-11-08 ENCOUNTER — HOSPITAL ENCOUNTER (OUTPATIENT)
Dept: OCCUPATIONAL THERAPY | Age: 69
Setting detail: THERAPIES SERIES
Discharge: HOME | End: 2024-11-08
Payer: MEDICARE

## 2024-11-08 PROCEDURE — 97110 THERAPEUTIC EXERCISES: CPT

## 2024-11-08 NOTE — PROGRESS NOTES
this morning. States that his pain goes to 8/10 with stretch with dowel winnie (ER stretch).     OBJECTIVE:    TREATMENT   Precautions: Sx 10/31/24; Muha protocol; PACEMAKER   Pain:  No pain at time of therapy;  LOCATION: left shoulder    “X” in shaded column indicates Activity Completed Today   Modalities Parameters/  Location  Notes/Comments                     Manual Therapy Time/  Technique  Notes/Comments                     Exercises   Sets/  Sec Reps  Notes/Comments   Scapular retraction  1 10 x    Scapular shrugs 1 10 x    Table slides for shoulder flexion to 90 1 10 x    Supine ER stretch with dowel winnie to 20 1 10 x    Pulleys for shoulder flexion to 90 1 15 x    Seated ER stretch with dowel to 20 1 10 x    Supine PROM for left shoulder flexion to 90 and ER at 20   x    Activities Time    Notes/Comments                       Specific Interventions Next Treatment: advance per Muha protocol    Activity/Treatment Tolerance:  [x]  Patient tolerated treatment well  []  Patient limited by fatigue  []  Patient limited by pain   []  Patient limited by other medical complications  []  Other:     Assessment: Patient is progressing toward goals.    GOALS:  Patient Goal: Be able to get to the point where I can lift, shovel, and work in the garden.     Short Term Goals:  Time Frame: 4 weeks  Be independent with HEP as instructed to increase his ability to eventually use his left arm to assist with driving tasks.  Report pain going no higher than 2/10 in left shoulder at any time to assist with sleep routine.  Report being independent with dressing tasks.    Long Term Goals:  Time Frame: 16 weeks  Be able to use left arm to turn steering wheel without difficulty or pain.  Be able to use left arm to retrieve item from drive thru without pain or difficulty.  Be able to assist his elderly father with needed activities without any increase in pain in left shoulder.   Report being able to perform his normal leisure tasks in

## 2024-11-11 ENCOUNTER — HOSPITAL ENCOUNTER (OUTPATIENT)
Dept: OCCUPATIONAL THERAPY | Age: 69
Setting detail: THERAPIES SERIES
Discharge: HOME | End: 2024-11-11
Payer: MEDICARE

## 2024-11-11 PROCEDURE — 97110 THERAPEUTIC EXERCISES: CPT

## 2024-11-11 NOTE — PROGRESS NOTES
[facility]  OCCUPATIONAL THERAPY  [] EVALUATION  [x] DAILY NOTE (LAND) [] DAILY NOTE (AQUATIC ) [] PROGRESS NOTE [] DISCHARGE NOTE    [] OUTPATIENT REHABILITATION CENTER [facility]   [] [place] [facility]    [x] [facility]   [] [facility]    Date: 2024  Patient Name:  Freddy Daniel  : 1955  MRN: [id number]  CSN: [id number]    Referring Practitioner Low Washington MD [id number]      Diagnosis  Diagnoses       M75.102 (ICD-10-CM) - Unspecified rotator cuff tear or rupture of left shoulder, not specified as traumatic           Treatment Diagnosis M25.512  Left Shoulder Pain  M79.622  Pain in Left Upper Arm  M25.612 Stiffness of Left Shoulder   Date of Evaluation 24   Additional Pertinent History Freddy Daniel has a past medical history of Atrial flutter (HCC), Calcium oxalate renal stones, Coronary artery disease involving native coronary artery of native heart without angina pectoris, Essential hypertension, GERD (gastroesophageal reflux disease), Hyperlipidemia, Osteoarthritis, Palpitation, and Ulcerative colitis (HCC).  he has a past surgical history that includes Colonoscopy; Cardiac catheterization; Rotator cuff repair; Knee arthroscopy; Finger trigger release; Tonsillectomy; Insertable Cardiac Monitor (2021); Percutaneous Transluminal Coronary Angio (2020); and Pacemaker insertion (2022).     Allergies No Known Allergies   Medications   Current Outpatient Medications:     metoprolol tartrate (LOPRESSOR) 25 MG tablet, Take 0.5 tablets by mouth 2 times daily, Disp: 90 tablet, Rfl: 3    rivaroxaban (XARELTO) 20 MG TABS tablet, Take 1 tablet by mouth daily (with breakfast), Disp: 90 tablet, Rfl: 3    nitroGLYCERIN (NITROSTAT) 0.4 MG SL tablet, USE UP TO A MAX OF 3 TOTAL DOSES; IF NO RELIEF AFTER 1 DOSE CALL 911, Disp: 25 tablet, Rfl: 3    atorvastatin (LIPITOR) 40 MG tablet, Take 1 tablet by mouth daily, Disp: 90 tablet, Rfl:

## 2024-11-15 ENCOUNTER — HOSPITAL ENCOUNTER (OUTPATIENT)
Dept: OCCUPATIONAL THERAPY | Age: 69
Setting detail: THERAPIES SERIES
Discharge: HOME | End: 2024-11-15
Payer: MEDICARE

## 2024-11-15 PROCEDURE — 97110 THERAPEUTIC EXERCISES: CPT

## 2024-11-15 NOTE — PROGRESS NOTES
[facility]  OCCUPATIONAL THERAPY  [] EVALUATION  [x] DAILY NOTE (LAND) [] DAILY NOTE (AQUATIC ) [] PROGRESS NOTE [] DISCHARGE NOTE    [] OUTPATIENT REHABILITATION CENTER [facility]   [] [place] [facility]    [x] [facility]   [] [facility]    Date: 11/15/2024  Patient Name:  Freddy Daniel  : 1955  MRN: [id number]  CSN: [id number]    Referring Practitioner Low Washington MD [id number]      Diagnosis  Diagnoses       M75.102 (ICD-10-CM) - Unspecified rotator cuff tear or rupture of left shoulder, not specified as traumatic           Treatment Diagnosis M25.512  Left Shoulder Pain  M79.622  Pain in Left Upper Arm  M25.612 Stiffness of Left Shoulder   Date of Evaluation 24   Additional Pertinent History Freddy Daniel has a past medical history of Atrial flutter (HCC), Calcium oxalate renal stones, Coronary artery disease involving native coronary artery of native heart without angina pectoris, Essential hypertension, GERD (gastroesophageal reflux disease), Hyperlipidemia, Osteoarthritis, Palpitation, and Ulcerative colitis (HCC).  he has a past surgical history that includes Colonoscopy; Cardiac catheterization; Rotator cuff repair; Knee arthroscopy; Finger trigger release; Tonsillectomy; Insertable Cardiac Monitor (2021); Percutaneous Transluminal Coronary Angio (2020); and Pacemaker insertion (2022).     Allergies No Known Allergies   Medications   Current Outpatient Medications:     metoprolol tartrate (LOPRESSOR) 25 MG tablet, Take 0.5 tablets by mouth 2 times daily, Disp: 90 tablet, Rfl: 3    rivaroxaban (XARELTO) 20 MG TABS tablet, Take 1 tablet by mouth daily (with breakfast), Disp: 90 tablet, Rfl: 3    nitroGLYCERIN (NITROSTAT) 0.4 MG SL tablet, USE UP TO A MAX OF 3 TOTAL DOSES; IF NO RELIEF AFTER 1 DOSE CALL 911, Disp: 25 tablet, Rfl: 3    atorvastatin (LIPITOR) 40 MG tablet, Take 1 tablet by mouth daily, Disp: 90 tablet, Rfl:

## 2024-11-18 ENCOUNTER — HOSPITAL ENCOUNTER (OUTPATIENT)
Dept: OCCUPATIONAL THERAPY | Age: 69
Setting detail: THERAPIES SERIES
Discharge: HOME | End: 2024-11-18
Payer: MEDICARE

## 2024-11-18 PROCEDURE — 97110 THERAPEUTIC EXERCISES: CPT

## 2024-11-18 NOTE — PROGRESS NOTES
[facility]  OCCUPATIONAL THERAPY  [] EVALUATION  [x] DAILY NOTE (LAND) [] DAILY NOTE (AQUATIC ) [] PROGRESS NOTE [] DISCHARGE NOTE    [] OUTPATIENT REHABILITATION CENTER [facility]   [] [place] [facility]    [x] [facility]   [] [facility]    Date: 2024  Patient Name:  Freddy Daniel  : 1955  MRN: [id number]  CSN: [id number]    Referring Practitioner Low Washington MD [id number]      Diagnosis  Diagnoses       M75.102 (ICD-10-CM) - Unspecified rotator cuff tear or rupture of left shoulder, not specified as traumatic           Treatment Diagnosis M25.512  Left Shoulder Pain  M79.622  Pain in Left Upper Arm  M25.612 Stiffness of Left Shoulder   Date of Evaluation 24   Additional Pertinent History Freddy Daniel has a past medical history of Atrial flutter (HCC), Calcium oxalate renal stones, Coronary artery disease involving native coronary artery of native heart without angina pectoris, Essential hypertension, GERD (gastroesophageal reflux disease), Hyperlipidemia, Osteoarthritis, Palpitation, and Ulcerative colitis (HCC).  he has a past surgical history that includes Colonoscopy; Cardiac catheterization; Rotator cuff repair; Knee arthroscopy; Finger trigger release; Tonsillectomy; Insertable Cardiac Monitor (2021); Percutaneous Transluminal Coronary Angio (2020); and Pacemaker insertion (2022).     Allergies No Known Allergies   Medications   Current Outpatient Medications:     metoprolol tartrate (LOPRESSOR) 25 MG tablet, Take 0.5 tablets by mouth 2 times daily, Disp: 90 tablet, Rfl: 3    rivaroxaban (XARELTO) 20 MG TABS tablet, Take 1 tablet by mouth daily (with breakfast), Disp: 90 tablet, Rfl: 3    nitroGLYCERIN (NITROSTAT) 0.4 MG SL tablet, USE UP TO A MAX OF 3 TOTAL DOSES; IF NO RELIEF AFTER 1 DOSE CALL 911, Disp: 25 tablet, Rfl: 3    atorvastatin (LIPITOR) 40 MG tablet, Take 1 tablet by mouth daily, Disp: 90 tablet, Rfl:

## 2024-11-22 ENCOUNTER — HOSPITAL ENCOUNTER (OUTPATIENT)
Dept: OCCUPATIONAL THERAPY | Age: 69
Setting detail: THERAPIES SERIES
Discharge: HOME | End: 2024-11-22
Payer: MEDICARE

## 2024-11-22 PROCEDURE — 97110 THERAPEUTIC EXERCISES: CPT

## 2024-11-22 NOTE — PROGRESS NOTES
[facility]  OCCUPATIONAL THERAPY  [] EVALUATION  [x] DAILY NOTE (LAND) [] DAILY NOTE (AQUATIC ) [] PROGRESS NOTE [] DISCHARGE NOTE    [] OUTPATIENT REHABILITATION CENTER [facility]   [] [place] [facility]    [x] [facility]   [] [facility]    Date: 2024  Patient Name:  Freddy Daniel  : 1955  MRN: [id number]  CSN: [id number]    Referring Practitioner Low Washington MD [id number]      Diagnosis  Diagnoses       M75.102 (ICD-10-CM) - Unspecified rotator cuff tear or rupture of left shoulder, not specified as traumatic           Treatment Diagnosis M25.512  Left Shoulder Pain  M79.622  Pain in Left Upper Arm  M25.612 Stiffness of Left Shoulder   Date of Evaluation 24   Additional Pertinent History Freddy Daniel has a past medical history of Atrial flutter (HCC), Calcium oxalate renal stones, Coronary artery disease involving native coronary artery of native heart without angina pectoris, Essential hypertension, GERD (gastroesophageal reflux disease), Hyperlipidemia, Osteoarthritis, Palpitation, and Ulcerative colitis (HCC).  he has a past surgical history that includes Colonoscopy; Cardiac catheterization; Rotator cuff repair; Knee arthroscopy; Finger trigger release; Tonsillectomy; Insertable Cardiac Monitor (2021); Percutaneous Transluminal Coronary Angio (2020); and Pacemaker insertion (2022).     Allergies No Known Allergies   Medications   Current Outpatient Medications:     metoprolol tartrate (LOPRESSOR) 25 MG tablet, Take 0.5 tablets by mouth 2 times daily, Disp: 90 tablet, Rfl: 3    rivaroxaban (XARELTO) 20 MG TABS tablet, Take 1 tablet by mouth daily (with breakfast), Disp: 90 tablet, Rfl: 3    nitroGLYCERIN (NITROSTAT) 0.4 MG SL tablet, USE UP TO A MAX OF 3 TOTAL DOSES; IF NO RELIEF AFTER 1 DOSE CALL 911, Disp: 25 tablet, Rfl: 3    atorvastatin (LIPITOR) 40 MG tablet, Take 1 tablet by mouth daily, Disp: 90 tablet, Rfl:

## 2024-11-25 ENCOUNTER — HOSPITAL ENCOUNTER (OUTPATIENT)
Dept: OCCUPATIONAL THERAPY | Age: 69
Setting detail: THERAPIES SERIES
Discharge: HOME | End: 2024-11-25
Payer: MEDICARE

## 2024-11-25 PROCEDURE — 97110 THERAPEUTIC EXERCISES: CPT

## 2024-11-25 NOTE — PROGRESS NOTES
[facility]  OCCUPATIONAL THERAPY  [] EVALUATION  [x] DAILY NOTE (LAND) [] DAILY NOTE (AQUATIC ) [] PROGRESS NOTE [] DISCHARGE NOTE    [] OUTPATIENT REHABILITATION CENTER [facility]   [] [place] [facility]ENTER    [x] [facility]   [] [facility]    Date: 2024  Patient Name:  Freddy Daniel  : 1955  MRN: [id number]  CSN: [id number]    Referring Practitioner Low Washington MD [id number]      Diagnosis  Diagnoses       M75.102 (ICD-10-CM) - Unspecified rotator cuff tear or rupture of left shoulder, not specified as traumatic           Treatment Diagnosis M25.512  Left Shoulder Pain  M79.622  Pain in Left Upper Arm  M25.612 Stiffness of Left Shoulder   Date of Evaluation 24   Additional Pertinent History Freddy Daniel has a past medical history of Atrial flutter (HCC), Calcium oxalate renal stones, Coronary artery disease involving native coronary artery of native heart without angina pectoris, Essential hypertension, GERD (gastroesophageal reflux disease), Hyperlipidemia, Osteoarthritis, Palpitation, and Ulcerative colitis (HCC).  he has a past surgical history that includes Colonoscopy; Cardiac catheterization; Rotator cuff repair; Knee arthroscopy; Finger trigger release; Tonsillectomy; Insertable Cardiac Monitor (2021); Percutaneous Transluminal Coronary Angio (2020); and Pacemaker insertion (2022).     Allergies No Known Allergies   Medications   Current Outpatient Medications:     metoprolol tartrate (LOPRESSOR) 25 MG tablet, Take 0.5 tablets by mouth 2 times daily, Disp: 90 tablet, Rfl: 3    rivaroxaban (XARELTO) 20 MG TABS tablet, Take 1 tablet by mouth daily (with breakfast), Disp: 90 tablet, Rfl: 3    nitroGLYCERIN (NITROSTAT) 0.4 MG SL tablet, USE UP TO A MAX OF 3 TOTAL DOSES; IF NO RELIEF AFTER 1 DOSE CALL 911, Disp: 25 tablet, Rfl: 3    atorvastatin (LIPITOR) 40 MG tablet, Take 1 tablet by mouth daily, Disp: 90 tablet, Rfl:

## 2024-11-27 ENCOUNTER — HOSPITAL ENCOUNTER (OUTPATIENT)
Dept: OCCUPATIONAL THERAPY | Age: 69
Setting detail: THERAPIES SERIES
Discharge: HOME | End: 2024-11-27
Payer: MEDICARE

## 2024-11-27 PROCEDURE — 97110 THERAPEUTIC EXERCISES: CPT

## 2024-11-27 NOTE — PROGRESS NOTES
[facility]  OCCUPATIONAL THERAPY  [] EVALUATION  [x] DAILY NOTE (LAND) [] DAILY NOTE (AQUATIC ) [] PROGRESS NOTE [] DISCHARGE NOTE    [] OUTPATIENT REHABILITATION CENTER [facility]   [] [place] [facility]    [x] [facility]   [] [facility]    Date: 2024  Patient Name:  Freddy Daniel  : 1955  MRN: [id number]  CSN: [id number]    Referring Practitioner Low Washington MD [id number]      Diagnosis  Diagnoses       M75.102 (ICD-10-CM) - Unspecified rotator cuff tear or rupture of left shoulder, not specified as traumatic           Treatment Diagnosis M25.512  Left Shoulder Pain  M79.622  Pain in Left Upper Arm  M25.612 Stiffness of Left Shoulder   Date of Evaluation 24   Additional Pertinent History Freddy Daniel has a past medical history of Atrial flutter (HCC), Calcium oxalate renal stones, Coronary artery disease involving native coronary artery of native heart without angina pectoris, Essential hypertension, GERD (gastroesophageal reflux disease), Hyperlipidemia, Osteoarthritis, Palpitation, and Ulcerative colitis (HCC).  he has a past surgical history that includes Colonoscopy; Cardiac catheterization; Rotator cuff repair; Knee arthroscopy; Finger trigger release; Tonsillectomy; Insertable Cardiac Monitor (2021); Percutaneous Transluminal Coronary Angio (2020); and Pacemaker insertion (2022).     Allergies No Known Allergies   Medications   Current Outpatient Medications:     metoprolol tartrate (LOPRESSOR) 25 MG tablet, Take 0.5 tablets by mouth 2 times daily, Disp: 90 tablet, Rfl: 3    rivaroxaban (XARELTO) 20 MG TABS tablet, Take 1 tablet by mouth daily (with breakfast), Disp: 90 tablet, Rfl: 3    nitroGLYCERIN (NITROSTAT) 0.4 MG SL tablet, USE UP TO A MAX OF 3 TOTAL DOSES; IF NO RELIEF AFTER 1 DOSE CALL 911, Disp: 25 tablet, Rfl: 3    atorvastatin (LIPITOR) 40 MG tablet, Take 1 tablet by mouth daily, Disp: 90 tablet, Rfl:

## 2024-12-03 ENCOUNTER — HOSPITAL ENCOUNTER (OUTPATIENT)
Dept: OCCUPATIONAL THERAPY | Age: 69
Setting detail: THERAPIES SERIES
Discharge: HOME | End: 2024-12-03
Payer: MEDICARE

## 2024-12-03 PROCEDURE — 97110 THERAPEUTIC EXERCISES: CPT

## 2024-12-06 ENCOUNTER — HOSPITAL ENCOUNTER (OUTPATIENT)
Dept: OCCUPATIONAL THERAPY | Age: 69
Setting detail: THERAPIES SERIES
Discharge: HOME | End: 2024-12-06
Payer: MEDICARE

## 2024-12-06 PROCEDURE — 97110 THERAPEUTIC EXERCISES: CPT

## 2024-12-06 NOTE — PROGRESS NOTES
[facility]  OCCUPATIONAL THERAPY  [] EVALUATION  [] DAILY NOTE (LAND) [] DAILY NOTE (AQUATIC ) [x] PROGRESS NOTE [] DISCHARGE NOTE    [] OUTPATIENT REHABILITATION CENTER [facility]   [] [place] AM[facility]ULATORY CARE CENTER    [x] [facility]   [] [facility]    Date: 2024  Patient Name:  Freddy Daniel  : 1955  MRN: [id number]  CSN: [id number]    Referring Practitioner Low Washington MD [phone]      Diagnosis  Diagnoses       M75.102 (ICD-10-CM) - Unspecified rotator cuff tear or rupture of left shoulder, not specified as traumatic           Treatment Diagnosis M25.512  Left Shoulder Pain  M79.622  Pain in Left Upper Arm  M25.612 Stiffness of Left Shoulder   Date of Evaluation 24   Additional Pertinent History Freddy Daniel has a past medical history of Atrial flutter (HCC), Calcium oxalate renal stones, Coronary artery disease involving native coronary artery of native heart without angina pectoris, Essential hypertension, GERD (gastroesophageal reflux disease), Hyperlipidemia, Osteoarthritis, Palpitation, and Ulcerative colitis (HCC).  he has a past surgical history that includes Colonoscopy; Cardiac catheterization; Rotator cuff repair; Knee arthroscopy; Finger trigger release; Tonsillectomy; Insertable Cardiac Monitor (2021); Percutaneous Transluminal Coronary Angio (2020); and Pacemaker insertion (2022).     Allergies No Known Allergies   Medications   Current Outpatient Medications:     metoprolol tartrate (LOPRESSOR) 25 MG tablet, Take 0.5 tablets by mouth 2 times daily, Disp: 90 tablet, Rfl: 3    rivaroxaban (XARELTO) 20 MG TABS tablet, Take 1 tablet by mouth daily (with breakfast), Disp: 90 tablet, Rfl: 3    nitroGLYCERIN (NITROSTAT) 0.4 MG SL tablet, USE UP TO A MAX OF 3 TOTAL DOSES; IF NO RELIEF AFTER 1 DOSE CALL 911, Disp: 25 tablet, Rfl: 3    atorvastatin (LIPITOR) 40 MG tablet, Take 1 tablet by mouth daily, Disp: 90 tablet, Rfl: 3

## 2024-12-09 ENCOUNTER — HOSPITAL ENCOUNTER (OUTPATIENT)
Dept: OCCUPATIONAL THERAPY | Age: 69
Setting detail: THERAPIES SERIES
Discharge: HOME | End: 2024-12-09
Payer: MEDICARE

## 2024-12-09 PROCEDURE — 97110 THERAPEUTIC EXERCISES: CPT

## 2024-12-09 NOTE — PROGRESS NOTES
[facility]  OCCUPATIONAL THERAPY  [] EVALUATION  [x] DAILY NOTE (LAND) [] DAILY NOTE (AQUATIC ) [] PROGRESS NOTE [] DISCHARGE NOTE    [] OUTPATIENT REHABILITATION CENTER [facility]   [] [place] [facility]    [x] [facility] [place]   [] [facility]    Date: 2024  Patient Name:  Freddy Daniel  : 1955  MRN: [id number]  CSN: [id number]    Referring Practitioner Low Washington MD [id number]      Diagnosis  Diagnoses       M75.102 (ICD-10-CM) - Unspecified rotator cuff tear or rupture of left shoulder, not specified as traumatic           Treatment Diagnosis M25.512  Left Shoulder Pain  M79.622  Pain in Left Upper Arm  M25.612 Stiffness of Left Shoulder   Date of Evaluation 24   Additional Pertinent History Freddy Daniel has a past medical history of Atrial flutter (HCC), Calcium oxalate renal stones, Coronary artery disease involving native coronary artery of native heart without angina pectoris, Essential hypertension, GERD (gastroesophageal reflux disease), Hyperlipidemia, Osteoarthritis, Palpitation, and Ulcerative colitis (HCC).  he has a past surgical history that includes Colonoscopy; Cardiac catheterization; Rotator cuff repair; Knee arthroscopy; Finger trigger release; Tonsillectomy; Insertable Cardiac Monitor (2021); Percutaneous Transluminal Coronary Angio (2020); and Pacemaker insertion (2022).     Allergies No Known Allergies   Medications   Current Outpatient Medications:     metoprolol tartrate (LOPRESSOR) 25 MG tablet, Take 0.5 tablets by mouth 2 times daily, Disp: 90 tablet, Rfl: 3    rivaroxaban (XARELTO) 20 MG TABS tablet, Take 1 tablet by mouth daily (with breakfast), Disp: 90 tablet, Rfl: 3    nitroGLYCERIN (NITROSTAT) 0.4 MG SL tablet, USE UP TO A MAX OF 3 TOTAL DOSES; IF NO RELIEF AFTER 1 DOSE CALL 911, Disp: 25 tablet, Rfl: 3    atorvastatin (LIPITOR) 40 MG tablet, Take 1 tablet by mouth daily, Disp: 90 tablet, Rfl: 3

## 2024-12-13 ENCOUNTER — HOSPITAL ENCOUNTER (OUTPATIENT)
Dept: OCCUPATIONAL THERAPY | Age: 69
Setting detail: THERAPIES SERIES
Discharge: HOME | End: 2024-12-13
Payer: MEDICARE

## 2024-12-13 PROCEDURE — 97110 THERAPEUTIC EXERCISES: CPT

## 2024-12-13 NOTE — PROGRESS NOTES
[facility]  OCCUPATIONAL THERAPY  [] EVALUATION  [x] DAILY NOTE (LAND) [] DAILY NOTE (AQUATIC ) [] PROGRESS NOTE [] DISCHARGE NOTE    [] OUTPATIENT REHABILITATION CENTER [facility]   [] [place] [facility]UL[facility]ENTER    [x] [facility]   [] [facility]    Date: 2024  Patient Name:  Freddy Daniel  : 1955  MRN: [id number]  CSN: [id number]    Referring Practitioner Low Washington MD [phone]      Diagnosis  Diagnoses       M75.102 (ICD-10-CM) - Unspecified rotator cuff tear or rupture of left shoulder, not specified as traumatic           Treatment Diagnosis M25.512  Left Shoulder Pain  M79.622  Pain in Left Upper Arm  M25.612 Stiffness of Left Shoulder   Date of Evaluation 24   Additional Pertinent History Freddy Daniel has a past medical history of Atrial flutter (HCC), Calcium oxalate renal stones, Coronary artery disease involving native coronary artery of native heart without angina pectoris, Essential hypertension, GERD (gastroesophageal reflux disease), Hyperlipidemia, Osteoarthritis, Palpitation, and Ulcerative colitis (HCC).  he has a past surgical history that includes Colonoscopy; Cardiac catheterization; Rotator cuff repair; Knee arthroscopy; Finger trigger release; Tonsillectomy; Insertable Cardiac Monitor (2021); Percutaneous Transluminal Coronary Angio (2020); and Pacemaker insertion (2022).     Allergies No Known Allergies   Medications   Current Outpatient Medications:     metoprolol tartrate (LOPRESSOR) 25 MG tablet, Take 0.5 tablets by mouth 2 times daily, Disp: 90 tablet, Rfl: 3    rivaroxaban (XARELTO) 20 MG TABS tablet, Take 1 tablet by mouth daily (with breakfast), Disp: 90 tablet, Rfl: 3    nitroGLYCERIN (NITROSTAT) 0.4 MG SL tablet, USE UP TO A MAX OF 3 TOTAL DOSES; IF NO RELIEF AFTER 1 DOSE CALL 911, Disp: 25 tablet, Rfl: 3    atorvastatin (LIPITOR) 40 MG tablet, Take 1 tablet by mouth daily, Disp: 90 tablet, Rfl:

## 2024-12-16 ENCOUNTER — HOSPITAL ENCOUNTER (OUTPATIENT)
Dept: OCCUPATIONAL THERAPY | Age: 69
Setting detail: THERAPIES SERIES
Discharge: HOME | End: 2024-12-16
Payer: MEDICARE

## 2024-12-16 PROCEDURE — 97110 THERAPEUTIC EXERCISES: CPT

## 2024-12-16 NOTE — PROGRESS NOTES
increased difficulty sleeping.      OBJECTIVE:    TREATMENT   Precautions: Sx 10/31/24; Muha protocol; PACEMAKER   Pain:  2/10 pain at time of therapy LOCATION: left shoulder    “X” in shaded column indicates Activity Completed Today   Modalities Parameters/  Location  Notes/Comments                     Manual Therapy Time/  Technique  Notes/Comments   STM to pec minor area and down along bicep muscle  x                Exercises   Sets/  Sec Reps  Notes/Comments   Bicep stretch- gentle  5 sec 5     Scapular retraction  1 15 x    Scapular shrugs 1 15     Table slides for shoulder flexion  1 10 x    Supine ER stretch with dowel winnie to 20 1 10  Increased PROM per protocol for 6 weeks    Pulleys for shoulder flexion  1 15 x    Seated ER stretch with dowel to 20 1 10     Submaximal isometrics  5 sec 5  Initiated per protocol    Supine AAROM in chest press and flexion with hands clasped  1 10  Initiated per protocol    Supine dowel for shoulder flexion and chest press 1 15 each x    Standing dowel for shoulder extension and IR up back 1 15 each x    Supine PROM for left shoulder flexion, abduction, and ER at side to patient tolerance   x    Activities Time    Notes/Comments                             Specific Interventions Next Treatment: advance per [facility] protocol    Activity/Treatment Tolerance:  [x]  Patient tolerated treatment well  []  Patient limited by fatigue  []  Patient limited by pain   []  Patient limited by other medical complications  []  Other:     Assessment:  Patient is progressing nicely toward goals. Suggested patient propping left UE in bed to assist with sleeping comfort.     Patient Goal: Be able to get to the point where I can lift, shovel, and work in the garden.     Short Term Goals:  Time Frame: 4 weeks  Be independent with HEP as instructed to increase his ability to eventually use his left arm to assist with driving tasks.   Report pain going no higher than 2/10 in left shoulder at any time to

## 2024-12-19 ENCOUNTER — HOSPITAL ENCOUNTER (OUTPATIENT)
Dept: OCCUPATIONAL THERAPY | Age: 69
Setting detail: THERAPIES SERIES
Discharge: HOME | End: 2024-12-19
Payer: MEDICARE

## 2024-12-19 PROCEDURE — 97110 THERAPEUTIC EXERCISES: CPT

## 2024-12-19 NOTE — PROGRESS NOTES
[facility]  OCCUPATIONAL THERAPY  [] EVALUATION  [x] DAILY NOTE (LAND) [] DAILY NOTE (AQUATIC ) [] PROGRESS NOTE [] DISCHARGE NOTE    [] OUTPATIENT REHABILITATION CENTER [facility]   [] [place] [facility] CENTER    [x] [facility] [place] [place]   [] [facility]    Date: 2024  Patient Name:  Freddy Daniel  : 1955  MRN: [id number]  CSN: [id number]    Referring Practitioner Low Washington MD [phone]      Diagnosis  Diagnoses       M75.102 (ICD-10-CM) - Unspecified rotator cuff tear or rupture of left shoulder, not specified as traumatic           Treatment Diagnosis M25.512  Left Shoulder Pain  M79.622  Pain in Left Upper Arm  M25.612 Stiffness of Left Shoulder   Date of Evaluation 24   Additional Pertinent History Freddy Daniel has a past medical history of Atrial flutter (HCC), Calcium oxalate renal stones, Coronary artery disease involving native coronary artery of native heart without angina pectoris, Essential hypertension, GERD (gastroesophageal reflux disease), Hyperlipidemia, Osteoarthritis, Palpitation, and Ulcerative colitis (HCC).  he has a past surgical history that includes Colonoscopy; Cardiac catheterization; Rotator cuff repair; Knee arthroscopy; Finger trigger release; Tonsillectomy; Insertable Cardiac Monitor (2021); Percutaneous Transluminal Coronary Angio (2020); and Pacemaker insertion (2022).     Allergies No Known Allergies   Medications   Current Outpatient Medications:     metoprolol tartrate (LOPRESSOR) 25 MG tablet, Take 0.5 tablets by mouth 2 times daily, Disp: 90 tablet, Rfl: 3    rivaroxaban (XARELTO) 20 MG TABS tablet, Take 1 tablet by mouth daily (with breakfast), Disp: 90 tablet, Rfl: 3    nitroGLYCERIN (NITROSTAT) 0.4 MG SL tablet, USE UP TO A MAX OF 3 TOTAL DOSES; IF NO RELIEF AFTER 1 DOSE CALL 911, Disp: 25 tablet, Rfl: 3    atorvastatin (LIPITOR) 40 MG tablet, Take 1 tablet by mouth daily, Disp: 90 tablet, Rfl:

## 2024-12-23 ENCOUNTER — HOSPITAL ENCOUNTER (OUTPATIENT)
Dept: OCCUPATIONAL THERAPY | Age: 69
Setting detail: THERAPIES SERIES
Discharge: HOME | End: 2024-12-23
Payer: MEDICARE

## 2024-12-23 PROCEDURE — 97110 THERAPEUTIC EXERCISES: CPT

## 2024-12-23 NOTE — PROGRESS NOTES
[facility]  OCCUPATIONAL THERAPY  [] EVALUATION  [x] DAILY NOTE (LAND) [] DAILY NOTE (AQUATIC ) [] PROGRESS NOTE [] DISCHARGE NOTE    [] OUTPATIENT REHABILITATION CENTER [facility]   [] [place] [facility]    [x] [facility]   [] [facility]    Date: 2024  Patient Name:  Freddy Daniel  : 1955  MRN: [id number]  CSN: [id number]    Referring Practitioner Low Washington MD [id number]      Diagnosis  Diagnoses       M75.102 (ICD-10-CM) - Unspecified rotator cuff tear or rupture of left shoulder, not specified as traumatic           Treatment Diagnosis M25.512  Left Shoulder Pain  M79.622  Pain in Left Upper Arm  M25.612 Stiffness of Left Shoulder   Date of Evaluation 24   Additional Pertinent History Freddy Daniel has a past medical history of Atrial flutter (HCC), Calcium oxalate renal stones, Coronary artery disease involving native coronary artery of native heart without angina pectoris, Essential hypertension, GERD (gastroesophageal reflux disease), Hyperlipidemia, Osteoarthritis, Palpitation, and Ulcerative colitis (HCC).  he has a past surgical history that includes Colonoscopy; Cardiac catheterization; Rotator cuff repair; Knee arthroscopy; Finger trigger release; Tonsillectomy; Insertable Cardiac Monitor (2021); Percutaneous Transluminal Coronary Angio (2020); and Pacemaker insertion (2022).     Allergies No Known Allergies   Medications   Current Outpatient Medications:     metoprolol tartrate (LOPRESSOR) 25 MG tablet, Take 0.5 tablets by mouth 2 times daily, Disp: 90 tablet, Rfl: 3    rivaroxaban (XARELTO) 20 MG TABS tablet, Take 1 tablet by mouth daily (with breakfast), Disp: 90 tablet, Rfl: 3    nitroGLYCERIN (NITROSTAT) 0.4 MG SL tablet, USE UP TO A MAX OF 3 TOTAL DOSES; IF NO RELIEF AFTER 1 DOSE CALL 911, Disp: 25 tablet, Rfl: 3    atorvastatin (LIPITOR) 40 MG tablet, Take 1 tablet by mouth daily, Disp: 90 tablet, Rfl:

## 2024-12-27 ENCOUNTER — HOSPITAL ENCOUNTER (OUTPATIENT)
Dept: OCCUPATIONAL THERAPY | Age: 69
Setting detail: THERAPIES SERIES
Discharge: HOME | End: 2024-12-27
Payer: MEDICARE

## 2024-12-27 PROCEDURE — 97110 THERAPEUTIC EXERCISES: CPT

## 2024-12-27 NOTE — PROGRESS NOTES
[facility]  OCCUPATIONAL THERAPY  [] EVALUATION  [x] DAILY NOTE (LAND) [] DAILY NOTE (AQUATIC ) [] PROGRESS NOTE [] DISCHARGE NOTE    [] OUTPATIENT REHABILITATION CENTER [facility]   [] [place] [facility]    [x] [facility]   [] [facility]    Date: 2024  Patient Name:  Freddy Daniel  : 1955  MRN: [id number]  CSN: [id number]    Referring Practitioner Low Washington MD [id number]      Diagnosis  Diagnoses       M75.102 (ICD-10-CM) - Unspecified rotator cuff tear or rupture of left shoulder, not specified as traumatic           Treatment Diagnosis M25.512  Left Shoulder Pain  M79.622  Pain in Left Upper Arm  M25.612 Stiffness of Left Shoulder   Date of Evaluation 24   Additional Pertinent History Freddy Daniel has a past medical history of Atrial flutter (HCC), Calcium oxalate renal stones, Coronary artery disease involving native coronary artery of native heart without angina pectoris, Essential hypertension, GERD (gastroesophageal reflux disease), Hyperlipidemia, Osteoarthritis, Palpitation, and Ulcerative colitis (HCC).  he has a past surgical history that includes Colonoscopy; Cardiac catheterization; Rotator cuff repair; Knee arthroscopy; Finger trigger release; Tonsillectomy; Insertable Cardiac Monitor (2021); Percutaneous Transluminal Coronary Angio (2020); and Pacemaker insertion (2022).     Allergies No Known Allergies   Medications   Current Outpatient Medications:     metoprolol tartrate (LOPRESSOR) 25 MG tablet, Take 0.5 tablets by mouth 2 times daily, Disp: 90 tablet, Rfl: 3    rivaroxaban (XARELTO) 20 MG TABS tablet, Take 1 tablet by mouth daily (with breakfast), Disp: 90 tablet, Rfl: 3    nitroGLYCERIN (NITROSTAT) 0.4 MG SL tablet, USE UP TO A MAX OF 3 TOTAL DOSES; IF NO RELIEF AFTER 1 DOSE CALL 911, Disp: 25 tablet, Rfl: 3    atorvastatin (LIPITOR) 40 MG tablet, Take 1 tablet by mouth daily, Disp: 90 tablet, Rfl:

## 2024-12-30 ENCOUNTER — HOSPITAL ENCOUNTER (OUTPATIENT)
Dept: OCCUPATIONAL THERAPY | Age: 69
Setting detail: THERAPIES SERIES
Discharge: HOME | End: 2024-12-30
Payer: MEDICARE

## 2024-12-30 PROCEDURE — 97110 THERAPEUTIC EXERCISES: CPT

## 2024-12-30 NOTE — PROGRESS NOTES
[facility]  OCCUPATIONAL THERAPY  [] EVALUATION  [x] DAILY NOTE (LAND) [] DAILY NOTE (AQUATIC ) [] PROGRESS NOTE [] DISCHARGE NOTE    [] OUTPATIENT REHABILITATION CENTER [facility]   [] [place] [facility]    [x] [facility]   [] [facility]    Date: 2024  Patient Name:  Freddy Daniel  : 1955  MRN: [id number]  CSN: [id number]    Referring Practitioner Low Washington MD [id number]      Diagnosis  Diagnoses       M75.102 (ICD-10-CM) - Unspecified rotator cuff tear or rupture of left shoulder, not specified as traumatic           Treatment Diagnosis M25.512  Left Shoulder Pain  M79.622  Pain in Left Upper Arm  M25.612 Stiffness of Left Shoulder   Date of Evaluation 24   Additional Pertinent History Freddy Daniel has a past medical history of Atrial flutter (HCC), Calcium oxalate renal stones, Coronary artery disease involving native coronary artery of native heart without angina pectoris, Essential hypertension, GERD (gastroesophageal reflux disease), Hyperlipidemia, Osteoarthritis, Palpitation, and Ulcerative colitis (HCC).  he has a past surgical history that includes Colonoscopy; Cardiac catheterization; Rotator cuff repair; Knee arthroscopy; Finger trigger release; Tonsillectomy; Insertable Cardiac Monitor (2021); Percutaneous Transluminal Coronary Angio (2020); and Pacemaker insertion (2022).     Allergies No Known Allergies   Medications   Current Outpatient Medications:     metoprolol tartrate (LOPRESSOR) 25 MG tablet, Take 0.5 tablets by mouth 2 times daily, Disp: 90 tablet, Rfl: 3    rivaroxaban (XARELTO) 20 MG TABS tablet, Take 1 tablet by mouth daily (with breakfast), Disp: 90 tablet, Rfl: 3    nitroGLYCERIN (NITROSTAT) 0.4 MG SL tablet, USE UP TO A MAX OF 3 TOTAL DOSES; IF NO RELIEF AFTER 1 DOSE CALL 911, Disp: 25 tablet, Rfl: 3    atorvastatin (LIPITOR) 40 MG tablet, Take 1 tablet by mouth daily, Disp: 90 tablet, Rfl:

## 2025-01-03 ENCOUNTER — HOSPITAL ENCOUNTER (OUTPATIENT)
Dept: OCCUPATIONAL THERAPY | Age: 70
Setting detail: THERAPIES SERIES
Discharge: HOME | End: 2025-01-03
Payer: MEDICARE

## 2025-01-03 PROCEDURE — 97110 THERAPEUTIC EXERCISES: CPT

## 2025-01-03 NOTE — PROGRESS NOTES
[facility]  OCCUPATIONAL THERAPY  [] EVALUATION  [x] DAILY NOTE (LAND) [] DAILY NOTE (AQUATIC ) [] PROGRESS NOTE [] DISCHARGE NOTE    [] OUTPATIENT REHABILITATION CENTER [facility]   [] [place] [facility]    [x] [facility]   [] [facility]    Date: 1/3/2025  Patient Name:  Freddy Daniel  : 1955  MRN: [id number]  CSN: [id number]    Referring Practitioner Low Washington MD [id number]      Diagnosis  Diagnoses       M75.102 (ICD-10-CM) - Unspecified rotator cuff tear or rupture of left shoulder, not specified as traumatic           Treatment Diagnosis M25.512  Left Shoulder Pain  M79.622  Pain in Left Upper Arm  M25.612 Stiffness of Left Shoulder   Date of Evaluation 24   Additional Pertinent History Freddy Daniel has a past medical history of Atrial flutter (HCC), Calcium oxalate renal stones, Coronary artery disease involving native coronary artery of native heart without angina pectoris, Essential hypertension, GERD (gastroesophageal reflux disease), Hyperlipidemia, Osteoarthritis, Palpitation, and Ulcerative colitis (HCC).  he has a past surgical history that includes Colonoscopy; Cardiac catheterization; Rotator cuff repair; Knee arthroscopy; Finger trigger release; Tonsillectomy; Insertable Cardiac Monitor (2021); Percutaneous Transluminal Coronary Angio (2020); and Pacemaker insertion (2022).     Allergies No Known Allergies   Medications   Current Outpatient Medications:     metoprolol tartrate (LOPRESSOR) 25 MG tablet, Take 0.5 tablets by mouth 2 times daily, Disp: 90 tablet, Rfl: 3    rivaroxaban (XARELTO) 20 MG TABS tablet, Take 1 tablet by mouth daily (with breakfast), Disp: 90 tablet, Rfl: 3    nitroGLYCERIN (NITROSTAT) 0.4 MG SL tablet, USE UP TO A MAX OF 3 TOTAL DOSES; IF NO RELIEF AFTER 1 DOSE CALL 911, Disp: 25 tablet, Rfl: 3    atorvastatin (LIPITOR) 40 MG tablet, Take 1 tablet by mouth daily, Disp: 90 tablet, Rfl: 3

## 2025-01-06 ENCOUNTER — HOSPITAL ENCOUNTER (OUTPATIENT)
Dept: OCCUPATIONAL THERAPY | Age: 70
Setting detail: THERAPIES SERIES
Discharge: HOME | End: 2025-01-06
Payer: MEDICARE

## 2025-01-06 PROCEDURE — 97110 THERAPEUTIC EXERCISES: CPT

## 2025-01-06 NOTE — PROGRESS NOTES
plan of care as follows:  See patient 2 x week x 8 weeks from 12/9/24  []  Hold pending physician visit  []  Discharge    Time In 1430   Time Out 1510   Timed Code Minutes: 40 min   Total Treatment Time: 40 min     Teresa Peña MOT OTR/L [id number]

## 2025-01-10 ENCOUNTER — HOSPITAL ENCOUNTER (OUTPATIENT)
Dept: OCCUPATIONAL THERAPY | Age: 70
Setting detail: THERAPIES SERIES
Discharge: HOME | End: 2025-01-10
Payer: MEDICARE

## 2025-01-10 PROCEDURE — 97110 THERAPEUTIC EXERCISES: CPT

## 2025-01-10 NOTE — PROGRESS NOTES
* PLEASE SIGN, DATE AND TIME CERTIFICATION BELOW AND RETURN TO [facility] OUTPATIENT REHABILITATION (FAX #: [phone]).  ATTEST/CO-SIGN IF ACCESSING VIA INBoundaryET.  THANK YOU.**    I certify that I have examined the patient below and determined that Physical Medicine and Rehabilitation service is necessary and that I approve the established plan of care for up to 90 days or as specifically noted.  Attestation, signature or co-signature of physician indicates approval of certification requirements.    ________________________ ____________  Physician Signature   Date      [facility]  OCCUPATIONAL THERAPY  [] EVALUATION  [] DAILY NOTE (LAND) [] DAILY NOTE (AQUATIC ) [x] PROGRESS NOTE [] DISCHARGE NOTE    [] OUTPATIENT REHABILITATION CENTER [facility]   [] [facility] CARE [place]    [x] [facility]   [] [facility]    Date: 1/10/2025  Patient Name:  Freddy Daniel  : 1955  MRN: [id number]  CSN: [id number]    Referring Practitioner Low Washington MD [id number]      Diagnosis  Diagnoses       M75.102 (ICD-10-CM) - Unspecified rotator cuff tear or rupture of left shoulder, not specified as traumatic           Treatment Diagnosis M25.512  Left Shoulder Pain  M79.622  Pain in Left Upper Arm  M25.612 Stiffness of Left Shoulder   Date of Evaluation 24   Additional Pertinent History Freddy Daniel has a past medical history of Atrial flutter (HCC), Calcium oxalate renal stones, Coronary artery disease involving native coronary artery of native heart without angina pectoris, Essential hypertension, GERD (gastroesophageal reflux disease), Hyperlipidemia, Osteoarthritis, Palpitation, and Ulcerative colitis (HCC).  he has a past surgical history that includes Colonoscopy; Cardiac catheterization; Rotator cuff repair; Knee arthroscopy; Finger trigger release; Tonsillectomy; Insertable Cardiac Monitor (2021); Percutaneous Transluminal Coronary Angio (2020); and

## 2025-01-13 ENCOUNTER — HOSPITAL ENCOUNTER (OUTPATIENT)
Dept: OCCUPATIONAL THERAPY | Age: 70
Setting detail: THERAPIES SERIES
Discharge: HOME | End: 2025-01-13
Payer: MEDICARE

## 2025-01-13 PROCEDURE — 97110 THERAPEUTIC EXERCISES: CPT

## 2025-01-13 NOTE — PROGRESS NOTES
[facility]  OCCUPATIONAL THERAPY  [] EVALUATION  [x] DAILY NOTE (LAND) [] DAILY NOTE (AQUATIC ) [] PROGRESS NOTE [] DISCHARGE NOTE    [] OUTPATIENT REHABILITATION CENTER [facility]   [] [place] [facility] C[facility]    [x] [facility]   [] [facility]    Date: 2025  Patient Name:  Freddy Daniel  : 1955  MRN: [id number]  CSN: [id number]    Referring Practitioner Low Washington MD [id number]      Diagnosis  Diagnoses       M75.102 (ICD-10-CM) - Unspecified rotator cuff tear or rupture of left shoulder, not specified as traumatic           Treatment Diagnosis M25.512  Left Shoulder Pain  M79.622  Pain in Left Upper Arm  M25.612 Stiffness of Left Shoulder   Date of Evaluation 24   Additional Pertinent History Freddy Daniel has a past medical history of Atrial flutter (HCC), Calcium oxalate renal stones, Coronary artery disease involving native coronary artery of native heart without angina pectoris, Essential hypertension, GERD (gastroesophageal reflux disease), Hyperlipidemia, Osteoarthritis, Palpitation, and Ulcerative colitis (HCC).  he has a past surgical history that includes Colonoscopy; Cardiac catheterization; Rotator cuff repair; Knee arthroscopy; Finger trigger release; Tonsillectomy; Insertable Cardiac Monitor (2021); Percutaneous Transluminal Coronary Angio (2020); and Pacemaker insertion (2022).     Allergies No Known Allergies   Medications   Current Outpatient Medications:     metoprolol tartrate (LOPRESSOR) 25 MG tablet, Take 0.5 tablets by mouth 2 times daily, Disp: 90 tablet, Rfl: 3    rivaroxaban (XARELTO) 20 MG TABS tablet, Take 1 tablet by mouth daily (with breakfast), Disp: 90 tablet, Rfl: 3    nitroGLYCERIN (NITROSTAT) 0.4 MG SL tablet, USE UP TO A MAX OF 3 TOTAL DOSES; IF NO RELIEF AFTER 1 DOSE CALL 911, Disp: 25 tablet, Rfl: 3    atorvastatin (LIPITOR) 40 MG tablet, Take 1 tablet by mouth daily, Disp: 90 tablet, Rfl:

## 2025-01-15 ENCOUNTER — APPOINTMENT (OUTPATIENT)
Dept: OCCUPATIONAL THERAPY | Age: 70
End: 2025-01-15
Payer: MEDICARE

## 2025-01-17 ENCOUNTER — HOSPITAL ENCOUNTER (OUTPATIENT)
Dept: OCCUPATIONAL THERAPY | Age: 70
Setting detail: THERAPIES SERIES
Discharge: HOME | End: 2025-01-17
Payer: MEDICARE

## 2025-01-17 PROCEDURE — 97110 THERAPEUTIC EXERCISES: CPT

## 2025-01-17 NOTE — PROGRESS NOTES
Precautions: Sx 10/31/24; Muha protocol; PACEMAKER   Pain:  No pain at time of therapy; 5/10 at highest in the past week  LOCATION: left shoulder    “X” in shaded column indicates Activity Completed Today   Modalities Parameters/  Location  Notes/Comments                     Manual Therapy Time/  Technique  Notes/Comments   STM to pec minor area and down along bicep muscle                  Exercises   Sets/  Sec Reps  Notes/Comments   Bicep stretch- gentle  5 sec 5     Scapular retraction  1 15     Scapular shrugs 1 15     Table slides for shoulder flexion  1 10     Table slides for shoulder abduction 1 10      Supine ER stretch with dowel winnie to tolerance  1 20     Pulleys for shoulder flexion  1 15 x    Seated ER stretch with dowel to 20 1 10     Posterior capsule stretch in supine  10 sec 5     Submaximal isometrics with left UE; flexion, extension, ER, IR 5 sec 10 each     Supine AAROM in chest press and flexion with hands clasped  1 10     Supine dowel for shoulder flexion and chest press 1 20 each     Standing dowel for shoulder extension and IR up back 1 15 each     peach theraband - sheridan with left UE 1 10 each direction x    Seated saeboglide with left UE - shoulder flexion and scaption  1 20 each x    Supine PREs with 1# in left UE - shoulder flexion, ceiling punches, ceiling circles 1 15 each  Reports no difficulty with weight added   Biodex at 120 speed x 3 minutes forward and 3 minutes backward   x    Supine PROM for left shoulder in all planes to patient tolerance   x    Activities Time    Notes/Comments                           Specific Interventions Next Treatment: advance per Muha protocol    Activity/Treatment Tolerance:  [x]  Patient tolerated treatment well  []  Patient limited by fatigue  []  Patient limited by pain   []  Patient limited by other medical complications  []  Other:     Assessment:   Patient reported having increased soreness this date- adjusted HEP to reduce resistance this

## 2025-01-20 ENCOUNTER — HOSPITAL ENCOUNTER (OUTPATIENT)
Dept: OCCUPATIONAL THERAPY | Age: 70
Setting detail: THERAPIES SERIES
Discharge: HOME | End: 2025-01-20
Payer: MEDICARE

## 2025-01-20 PROCEDURE — 97110 THERAPEUTIC EXERCISES: CPT

## 2025-01-20 NOTE — PROGRESS NOTES
Precautions: Sx 10/31/24; Muha protocol; PACEMAKER   Pain:  No pain at time of therapy; 5/10 at highest in the past week  LOCATION: left shoulder    “X” in shaded column indicates Activity Completed Today   Modalities Parameters/  Location  Notes/Comments                     Manual Therapy Time/  Technique  Notes/Comments   STM to pec minor area and down along bicep muscle                  Exercises   Sets/  Sec Reps  Notes/Comments   Bicep stretch- gentle  5 sec 5     Scapular retraction  1 15     Scapular shrugs 1 15     Table slides for shoulder flexion  1 10     Table slides for shoulder abduction 1 10      Supine ER stretch with dowel winnie to tolerance  1 20     Pulleys for shoulder flexion  1 15 x    Seated ER stretch with dowel to 20 1 10     Posterior capsule stretch in supine  10 sec 5     Submaximal isometrics with left UE; flexion, extension, ER, IR 5 sec 10 each     Supine AAROM in chest press and flexion with hands clasped  1 10     Supine dowel for shoulder flexion and chest press 1 20 each     Standing dowel for shoulder extension and IR up back 1 15 each     Supine peach therababd- horiz abd/add, diagonals  1 10 x    peach theraband - sheridan, rows with left UE 1 10 each direction x    Seated saeboglide with left UE - shoulder flexion and scaption  1 20 each x    Supine PREs with 1# in left UE - shoulder flexion, ceiling punches, ceiling circles 1 15 each  Reports no difficulty with weight added   Biodex at 120 speed x 3 minutes forward and 3 minutes backward   x    Supine PROM for left shoulder in all planes to patient tolerance   x    Activities Time    Notes/Comments                           Specific Interventions Next Treatment: advance per [facility] protocol    Activity/Treatment Tolerance:  [x]  Patient tolerated treatment well  []  Patient limited by fatigue  []  Patient limited by pain   []  Patient limited by other medical complications  []  Other:     Assessment:   Patient is progressing

## 2025-01-21 DIAGNOSIS — I10 ESSENTIAL HYPERTENSION: ICD-10-CM

## 2025-01-21 DIAGNOSIS — E78.00 PURE HYPERCHOLESTEROLEMIA: ICD-10-CM

## 2025-01-21 RX ORDER — LOSARTAN POTASSIUM 50 MG/1
TABLET ORAL
Qty: 90 TABLET | Refills: 0 | Status: SHIPPED | OUTPATIENT
Start: 2025-01-21

## 2025-01-21 RX ORDER — ATORVASTATIN CALCIUM 40 MG/1
40 TABLET, FILM COATED ORAL DAILY
Qty: 90 TABLET | Refills: 0 | Status: SHIPPED | OUTPATIENT
Start: 2025-01-21

## 2025-01-24 ENCOUNTER — HOSPITAL ENCOUNTER (OUTPATIENT)
Dept: OCCUPATIONAL THERAPY | Age: 70
Setting detail: THERAPIES SERIES
Discharge: HOME | End: 2025-01-24
Payer: MEDICARE

## 2025-01-24 ENCOUNTER — APPOINTMENT (OUTPATIENT)
Dept: OCCUPATIONAL THERAPY | Age: 70
End: 2025-01-24
Payer: MEDICARE

## 2025-01-24 PROCEDURE — 97110 THERAPEUTIC EXERCISES: CPT

## 2025-01-24 NOTE — PROGRESS NOTES
Precautions: Sx 10/31/24; Muha protocol; PACEMAKER   Pain:  2-3/10 pain LOCATION: left bicep region     “X” in shaded column indicates Activity Completed Today   Modalities Parameters/  Location  Notes/Comments                     Manual Therapy Time/  Technique  Notes/Comments   STM to pec minor area and down along bicep muscle                  Exercises   Sets/  Sec Reps  Notes/Comments   Bicep stretch- gentle  5 sec 5     Scapular retraction  1 15     Scapular shrugs 1 15     Table slides for shoulder flexion  1 10 x    Table slides for shoulder abduction 1 10 x  Reports this feeling much better with rock tape in place   Supine ER stretch with dowel winnie to tolerance  1 20     Pulleys for shoulder flexion  1 15 x    Seated ER stretch with dowel to 20 1 10     Posterior capsule stretch in supine  10 sec 5     Submaximal isometrics with left UE; flexion, extension, ER, IR 5 sec 10 each     Supine AAROM in chest press and flexion with hands clasped  1 10     Supine dowel for shoulder flexion and chest press 1 20 each     Standing dowel for shoulder extension and IR up back 1 15 each     IR stretch with towel behind back for left shoulder IR 1 10 x 10 second hold   Supine peach therababd- horiz abd/add, diagonals  1 10 each x    peach theraband - sheridan, rows with left UE 1 10 each direction     Seated saeboglide with left UE - shoulder flexion and scaption  1 20 each     Supine PREs with 1# in left UE - shoulder flexion, ceiling punches, ceiling circles 1 15 each  Reports no difficulty with weight added   Biodex at 120 speed x 3 minutes forward and 3 minutes backward   x    Supine PROM for left shoulder in all planes to patient tolerance   x    Activities Time    Notes/Comments   Rock tape applied to left UE - I strip on bicep and Y strip around bicep  x Completed for pain management                     Specific Interventions Next Treatment: advance per Muha protocol    Activity/Treatment Tolerance:  [x]  Patient

## 2025-01-27 ENCOUNTER — HOSPITAL ENCOUNTER (OUTPATIENT)
Dept: OCCUPATIONAL THERAPY | Age: 70
Setting detail: THERAPIES SERIES
Discharge: HOME | End: 2025-01-27
Payer: MEDICARE

## 2025-01-27 PROCEDURE — 97110 THERAPEUTIC EXERCISES: CPT

## 2025-01-27 NOTE — PROGRESS NOTES
patient tolerance   x    Activities Time    Notes/Comments   Rock tape applied to left UE - I strip on bicep and Y strip around deltoid  x Completed for pain management                     Specific Interventions Next Treatment: advance per [facility] protocol    Activity/Treatment Tolerance:  [x]  Patient tolerated treatment well  []  Patient limited by fatigue  []  Patient limited by pain   []  Patient limited by other medical complications  []  Other:     Assessment:   Patient is progressing toward goals nicely. Patient tolerating gradual increases in activity.    Patient Goal: Be able to get to the point where I can lift, shovel, and work in the garden.     Short Term Goals:  Time Frame: 4 weeks  Be independent with HEP as instructed to increase his ability to eventually use his left arm to assist with driving tasks.   Report pain going no higher than 2/10 in left shoulder at any time to assist with sleep routine.     Increase passive left shoulder flexion to 170, abduction to 170, ER at 90 to 75, and IR to 80 to increase flexibility for eventual active use of left UE for normal daily activities.   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     Long Term Goals:  Time Frame: 12 weeks  Be able to use left arm to turn steering wheel without difficulty or pain.   Be able to use left arm to retrieve item from drive thru without pain or difficulty.   Be able to assist his elderly father with needed activities without any increase in pain in left shoulder.   Report being able to perform his normal leisure tasks in workshop without difficulty.     Patient Education:   [x]  HEP/Education Completed: Plan of Care, Goals, HEP - scapular retraction, scapular shrugs, table slides to 90 flexion, ER dowel in supine to 20; use of ice  11/8/24: pulleys for shoulder flexion to 90, seated ER

## 2025-01-31 ENCOUNTER — HOSPITAL ENCOUNTER (OUTPATIENT)
Dept: OCCUPATIONAL THERAPY | Age: 70
Setting detail: THERAPIES SERIES
Discharge: HOME | End: 2025-01-31
Payer: MEDICARE

## 2025-01-31 PROCEDURE — 97110 THERAPEUTIC EXERCISES: CPT

## 2025-01-31 NOTE — PROGRESS NOTES
[facility]  OCCUPATIONAL THERAPY  [] EVALUATION  [x] DAILY NOTE (LAND) [] DAILY NOTE (AQUATIC ) [] PROGRESS NOTE [] DISCHARGE NOTE    [] OUTPATIENT REHABILITATION CENTER [facility]   [] [place] [facility]ARE CENTER    [x] [facility]   [facility]    Date: 2025  Patient Name:  Freddy Daniel  : 1955  MRN: [id number]  CSN: [id number]    Referring Practitioner Low Washington MD [id number]      Diagnosis  Diagnoses       M75.102 (ICD-10-CM) - Unspecified rotator cuff tear or rupture of left shoulder, not specified as traumatic           Treatment Diagnosis M25.512  Left Shoulder Pain  M79.622  Pain in Left Upper Arm  M25.612 Stiffness of Left Shoulder   Date of Evaluation 24   Additional Pertinent History Freddy Daniel has a past medical history of Atrial flutter (HCC), Calcium oxalate renal stones, Coronary artery disease involving native coronary artery of native heart without angina pectoris, Essential hypertension, GERD (gastroesophageal reflux disease), Hyperlipidemia, Osteoarthritis, Palpitation, and Ulcerative colitis (HCC).  he has a past surgical history that includes Colonoscopy; Cardiac catheterization; Rotator cuff repair; Knee arthroscopy; Finger trigger release; Tonsillectomy; Insertable Cardiac Monitor (2021); Percutaneous Transluminal Coronary Angio (2020); and Pacemaker insertion (2022).     Allergies No Known Allergies   Medications   Current Outpatient Medications:     atorvastatin (LIPITOR) 40 MG tablet, Take 1 tablet by mouth once daily, Disp: 90 tablet, Rfl: 0    losartan (COZAAR) 50 MG tablet, Take 1 tablet by mouth once daily, Disp: 90 tablet, Rfl: 0    metoprolol tartrate (LOPRESSOR) 25 MG tablet, Take 0.5 tablets by mouth 2 times daily, Disp: 90 tablet, Rfl: 3    rivaroxaban (XARELTO) 20 MG TABS tablet, Take 1 tablet by mouth daily (with breakfast), Disp: 90 tablet, Rfl: 3    nitroGLYCERIN (NITROSTAT) 0.4 MG SL

## 2025-02-03 ENCOUNTER — LAB (OUTPATIENT)
Dept: LAB | Age: 70
End: 2025-02-03

## 2025-02-03 ENCOUNTER — HOSPITAL ENCOUNTER (OUTPATIENT)
Dept: OCCUPATIONAL THERAPY | Age: 70
Setting detail: THERAPIES SERIES
Discharge: HOME | End: 2025-02-03
Payer: MEDICARE

## 2025-02-03 DIAGNOSIS — I10 ESSENTIAL HYPERTENSION: ICD-10-CM

## 2025-02-03 DIAGNOSIS — I48.3 TYPICAL ATRIAL FLUTTER (HCC): ICD-10-CM

## 2025-02-03 DIAGNOSIS — E78.00 PURE HYPERCHOLESTEROLEMIA: ICD-10-CM

## 2025-02-03 DIAGNOSIS — N52.9 ERECTILE DYSFUNCTION, UNSPECIFIED ERECTILE DYSFUNCTION TYPE: ICD-10-CM

## 2025-02-03 LAB
ALBUMIN SERPL BCG-MCNC: 4.1 G/DL (ref 3.5–5.1)
ALP SERPL-CCNC: 77 U/L (ref 38–126)
ALT SERPL W/O P-5'-P-CCNC: 22 U/L (ref 11–66)
ANION GAP SERPL CALC-SCNC: 10 MEQ/L (ref 8–16)
AST SERPL-CCNC: 22 U/L (ref 5–40)
BILIRUB SERPL-MCNC: 0.6 MG/DL (ref 0.3–1.2)
BUN SERPL-MCNC: 17 MG/DL (ref 7–22)
CALCIUM SERPL-MCNC: 9.3 MG/DL (ref 8.5–10.5)
CHLORIDE SERPL-SCNC: 106 MEQ/L (ref 98–111)
CHOLEST SERPL-MCNC: 108 MG/DL (ref 100–199)
CO2 SERPL-SCNC: 27 MEQ/L (ref 23–33)
CREAT SERPL-MCNC: 0.8 MG/DL (ref 0.4–1.2)
DEPRECATED RDW RBC AUTO: 39.7 FL (ref 35–45)
ERYTHROCYTE [DISTWIDTH] IN BLOOD BY AUTOMATED COUNT: 11.7 % (ref 11.5–14.5)
GFR SERPL CREATININE-BSD FRML MDRD: > 90 ML/MIN/1.73M2
GLUCOSE FASTING: 83 MG/DL (ref 70–108)
HCT VFR BLD AUTO: 40.7 % (ref 42–52)
HDLC SERPL-MCNC: 38 MG/DL
HGB BLD-MCNC: 13.6 GM/DL (ref 14–18)
LDLC SERPL CALC-MCNC: 59 MG/DL
MCH RBC QN AUTO: 30.9 PG (ref 26–33)
MCHC RBC AUTO-ENTMCNC: 33.4 GM/DL (ref 32.2–35.5)
MCV RBC AUTO: 92.5 FL (ref 80–94)
PLATELET # BLD AUTO: 228 THOU/MM3 (ref 130–400)
PMV BLD AUTO: 8.4 FL (ref 9.4–12.4)
POTASSIUM SERPL-SCNC: 4.6 MEQ/L (ref 3.5–5.2)
PROT SERPL-MCNC: 6.6 G/DL (ref 6.1–8)
PSA SERPL-MCNC: 0.54 NG/ML (ref 0–1)
RBC # BLD AUTO: 4.4 MILL/MM3 (ref 4.7–6.1)
SODIUM SERPL-SCNC: 143 MEQ/L (ref 135–145)
TRIGL SERPL-MCNC: 56 MG/DL (ref 0–199)
TSH SERPL DL<=0.005 MIU/L-ACNC: 1 UIU/ML (ref 0.4–4.2)
WBC # BLD AUTO: 4.6 THOU/MM3 (ref 4.8–10.8)

## 2025-02-03 PROCEDURE — 97110 THERAPEUTIC EXERCISES: CPT

## 2025-02-03 NOTE — PROGRESS NOTES
[facility]  OCCUPATIONAL THERAPY  [] EVALUATION  [x] DAILY NOTE (LAND) [] DAILY NOTE (AQUATIC ) [] PROGRESS NOTE [] DISCHARGE NOTE    [] OUTPATIENT REHABILITATION CENTER [facility]   [] [place] [facility]    [x] [facility]   [] [facility]    Date: 2/3/2025  Patient Name:  Freddy Daniel  : 1955  MRN: [id number]  CSN: [id number]    Referring Practitioner Low Washington MD [id number]      Diagnosis  Diagnoses       M75.102 (ICD-10-CM) - Unspecified rotator cuff tear or rupture of left shoulder, not specified as traumatic           Treatment Diagnosis M25.512  Left Shoulder Pain  M79.622  Pain in Left Upper Arm  M25.612 Stiffness of Left Shoulder   Date of Evaluation 24   Additional Pertinent History Freddy Daniel has a past medical history of Atrial flutter (HCC), Calcium oxalate renal stones, Coronary artery disease involving native coronary artery of native heart without angina pectoris, Essential hypertension, GERD (gastroesophageal reflux disease), Hyperlipidemia, Osteoarthritis, Palpitation, and Ulcerative colitis (HCC).  he has a past surgical history that includes Colonoscopy; Cardiac catheterization; Rotator cuff repair; Knee arthroscopy; Finger trigger release; Tonsillectomy; Insertable Cardiac Monitor (2021); Percutaneous Transluminal Coronary Angio (2020); and Pacemaker insertion (2022).     Allergies No Known Allergies   Medications   Current Outpatient Medications:     atorvastatin (LIPITOR) 40 MG tablet, Take 1 tablet by mouth once daily, Disp: 90 tablet, Rfl: 0    losartan (COZAAR) 50 MG tablet, Take 1 tablet by mouth once daily, Disp: 90 tablet, Rfl: 0    metoprolol tartrate (LOPRESSOR) 25 MG tablet, Take 0.5 tablets by mouth 2 times daily, Disp: 90 tablet, Rfl: 3    rivaroxaban (XARELTO) 20 MG TABS tablet, Take 1 tablet by mouth daily (with breakfast), Disp: 90 tablet, Rfl: 3    nitroGLYCERIN (NITROSTAT) 0.4 MG SL

## 2025-02-07 ENCOUNTER — HOSPITAL ENCOUNTER (OUTPATIENT)
Dept: OCCUPATIONAL THERAPY | Age: 70
Setting detail: THERAPIES SERIES
Discharge: HOME | End: 2025-02-07
Payer: MEDICARE

## 2025-02-07 PROCEDURE — 97110 THERAPEUTIC EXERCISES: CPT

## 2025-02-07 NOTE — PROGRESS NOTES
without any increase in pain in left shoulder.   Report being able to perform his normal leisure tasks in workshop without difficulty.     Patient Education:   []  HEP/Education Completed: Plan of Care, Goals, HEP - scapular retraction, scapular shrugs, table slides to 90 flexion, ER dowel in supine to 20; use of ice  11/8/24: pulleys for shoulder flexion to 90, seated ER with dowel; to do ER stretch as the last stretch with HEP  12/6/24: goal status  12/13/24: supine dowel for shoulder flexion and chest press, standing dowel for shoulder extension and IR  12/19/24: table slides for shoulder abduction  12/23/24: isometrics  1/3/25: supine AROM - shoulder flexion, ceiling punches, ceiling circles  1/6/25: supine posterior capsule stretch  1/10/25: goal status  1/13/25: orange theraband - sheridan with left UE; to add 1# to supine AROM exercises   1/17/25: to start using peach theraband; to not use 1# over the weekend on supine AROM  1/20/25: rows with theraband  1/24/25: IR stretch behind back with towel  1/27/25: to use orange theraband as able for HEP  1/31/25: orange theraband- bilateral ER in standing, rows with elbows extended and flexed; peach theraband - standing riivalid  []  No new Education completed;   [x]  Reviewed Prior HEP      [x]  Patient verbalized and/or demonstrated understanding of education provided.  []  Patient unable to verbalize and/or demonstrate understanding of education provided.  Will continue education.  [x]  Barriers to learning: none    PLAN:      []  Plan of care initiated.  Plan to see patient 2 times per week for 16 weeks to address the treatment planned outlined above.  [x]  Continue with current plan of care  []  Modify plan of care as follows:  See patient 2 x week x 12 weeks from 1/10/25  []  Hold pending physician visit  []  Discharge    Time In 1300   Time Out 1340   Timed Code Minutes: 40 min   Total Treatment Time: 40 min     Yesica Salazar, OTR/L #[id number]

## 2025-02-09 NOTE — PROGRESS NOTES
(HCC)    Other ulcerative colitis without complication (HCC)    Pure hypercholesterolemia  -     atorvastatin (LIPITOR) 40 MG tablet; Take 1 tablet by mouth daily    Coronary artery disease involving native coronary artery of native heart without angina pectoris    Chronic seasonal allergic rhinitis due to pollen    Gastroesophageal reflux disease without esophagitis    Angina, class III (HCC)    Anginal equivalent (HCC)    Primary osteoarthritis involving multiple joints    Episodic cluster headache, not intractable    Erectile dysfunction, unspecified erectile dysfunction type    AV block    RBBB    Urinary frequency    Onychomycosis    Chronic atrial fibrillation, unspecified    Viral URI  -     levocetirizine (XYZAL ALLERGY 24HR) 5 MG tablet; Take 1 tablet by mouth nightly      No change to medications   Continue healthy diet and exercise  Aspirin daily       Discussed use, benefit, and side effectsof prescribed medications.  All patient questions answered.  Pt voiced understanding. Reviewed health maintenance.  Instructed to continue current medications, diet and exercise.  Patient agreed with treatment plan. Followup as directed.     The patient (or guardian, if applicable) and other individuals in attendance with the patient were advised that Artificial Intelligence will be utilized during this visit to record, process the conversation to generate a clinical note, and support improvement of the AI technology. The patient (or guardian, if applicable) and other individuals in attendance at the appointment consented to the use of AI, including the recording.          Electronically signed by Eve Wyman MD

## 2025-02-10 ENCOUNTER — HOSPITAL ENCOUNTER (OUTPATIENT)
Dept: OCCUPATIONAL THERAPY | Age: 70
Setting detail: THERAPIES SERIES
Discharge: HOME | End: 2025-02-10
Payer: MEDICARE

## 2025-02-10 PROCEDURE — 97110 THERAPEUTIC EXERCISES: CPT

## 2025-02-10 NOTE — PROGRESS NOTES
side to side, circles  1 10     Standing side raises and front raise to 90 degrees for deltoid strengthening  1 10  Initiated this date- completed in mirror to decrease compensation at shoulder    Biodex at 80 speed x 3 minutes forward and 3 minutes backward   x    Supine PROM for left shoulder in all planes to patient tolerance   x Focus on IR of left shoulder during PROM   Activities Time    Notes/Comments   Rock tape applied to left UE - I strip on bicep and Y strip around deltoid   Completed for pain management                     Specific Interventions Next Treatment: advance per [facility] protocol    Activity/Treatment Tolerance:  [x]  Patient tolerated treatment well  []  Patient limited by fatigue  []  Patient limited by pain   []  Patient limited by other medical complications  []  Other:     Assessment:   Patient is progressing nicely toward goals. Patient was able to tolerate increase in resistance this date.     Patient Goal: Be able to get to the point where I can lift, shovel, and work in the garden.     Short Term Goals:  Time Frame: 4 weeks  Be independent with HEP as instructed to increase his ability to eventually use his left arm to assist with driving tasks.   Report pain going no higher than 2/10 in left shoulder at any time to assist with sleep routine.     Increase passive left shoulder flexion to 170, abduction to 170, ER at 90 to 75, and IR to 80 to increase flexibility for eventual active use of left UE for normal daily activities.   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     Long Term Goals:  Time Frame: 12 weeks  Be able to use left arm to turn steering wheel without difficulty or pain.   Be able to use left arm to retrieve item from drive thru without pain or difficulty.   Be able to assist his elderly father with needed activities without any

## 2025-02-11 ENCOUNTER — OFFICE VISIT (OUTPATIENT)
Dept: FAMILY MEDICINE CLINIC | Age: 70
End: 2025-02-11

## 2025-02-11 VITALS
TEMPERATURE: 98.7 F | DIASTOLIC BLOOD PRESSURE: 66 MMHG | HEIGHT: 65 IN | WEIGHT: 189.6 LBS | OXYGEN SATURATION: 97 % | RESPIRATION RATE: 16 BRPM | SYSTOLIC BLOOD PRESSURE: 102 MMHG | HEART RATE: 72 BPM | BODY MASS INDEX: 31.59 KG/M2

## 2025-02-11 DIAGNOSIS — N52.9 ERECTILE DYSFUNCTION, UNSPECIFIED ERECTILE DYSFUNCTION TYPE: ICD-10-CM

## 2025-02-11 DIAGNOSIS — R35.0 URINARY FREQUENCY: ICD-10-CM

## 2025-02-11 DIAGNOSIS — I48.20 CHRONIC ATRIAL FIBRILLATION, UNSPECIFIED (HCC): ICD-10-CM

## 2025-02-11 DIAGNOSIS — I20.9 ANGINA, CLASS III (HCC): ICD-10-CM

## 2025-02-11 DIAGNOSIS — J30.1 CHRONIC SEASONAL ALLERGIC RHINITIS DUE TO POLLEN: ICD-10-CM

## 2025-02-11 DIAGNOSIS — I25.10 CORONARY ARTERY DISEASE INVOLVING NATIVE CORONARY ARTERY OF NATIVE HEART WITHOUT ANGINA PECTORIS: ICD-10-CM

## 2025-02-11 DIAGNOSIS — K21.9 GASTROESOPHAGEAL REFLUX DISEASE WITHOUT ESOPHAGITIS: ICD-10-CM

## 2025-02-11 DIAGNOSIS — G44.019 EPISODIC CLUSTER HEADACHE, NOT INTRACTABLE: ICD-10-CM

## 2025-02-11 DIAGNOSIS — K51.80 OTHER ULCERATIVE COLITIS WITHOUT COMPLICATION (HCC): ICD-10-CM

## 2025-02-11 DIAGNOSIS — I45.10 RBBB: ICD-10-CM

## 2025-02-11 DIAGNOSIS — M15.0 PRIMARY OSTEOARTHRITIS INVOLVING MULTIPLE JOINTS: ICD-10-CM

## 2025-02-11 DIAGNOSIS — I48.3 TYPICAL ATRIAL FLUTTER (HCC): ICD-10-CM

## 2025-02-11 DIAGNOSIS — E78.00 PURE HYPERCHOLESTEROLEMIA: ICD-10-CM

## 2025-02-11 DIAGNOSIS — B35.1 ONYCHOMYCOSIS: ICD-10-CM

## 2025-02-11 DIAGNOSIS — I44.30 AV BLOCK: ICD-10-CM

## 2025-02-11 DIAGNOSIS — J06.9 VIRAL URI: ICD-10-CM

## 2025-02-11 DIAGNOSIS — I10 ESSENTIAL HYPERTENSION: Primary | ICD-10-CM

## 2025-02-11 DIAGNOSIS — I20.89 ANGINAL EQUIVALENT (HCC): ICD-10-CM

## 2025-02-11 RX ORDER — LOSARTAN POTASSIUM 50 MG/1
TABLET ORAL
Qty: 90 TABLET | Refills: 3 | Status: SHIPPED | OUTPATIENT
Start: 2025-02-11

## 2025-02-11 RX ORDER — ATORVASTATIN CALCIUM 40 MG/1
40 TABLET, FILM COATED ORAL DAILY
Qty: 90 TABLET | Refills: 3 | Status: SHIPPED | OUTPATIENT
Start: 2025-02-11

## 2025-02-11 RX ORDER — LEVOCETIRIZINE DIHYDROCHLORIDE 5 MG/1
5 TABLET, FILM COATED ORAL NIGHTLY
Qty: 90 TABLET | Refills: 3 | Status: SHIPPED | OUTPATIENT
Start: 2025-02-11

## 2025-02-11 SDOH — ECONOMIC STABILITY: FOOD INSECURITY: WITHIN THE PAST 12 MONTHS, YOU WORRIED THAT YOUR FOOD WOULD RUN OUT BEFORE YOU GOT MONEY TO BUY MORE.: NEVER TRUE

## 2025-02-11 SDOH — ECONOMIC STABILITY: FOOD INSECURITY: WITHIN THE PAST 12 MONTHS, THE FOOD YOU BOUGHT JUST DIDN'T LAST AND YOU DIDN'T HAVE MONEY TO GET MORE.: NEVER TRUE

## 2025-02-11 ASSESSMENT — PATIENT HEALTH QUESTIONNAIRE - PHQ9
SUM OF ALL RESPONSES TO PHQ QUESTIONS 1-9: 0
SUM OF ALL RESPONSES TO PHQ9 QUESTIONS 1 & 2: 0
SUM OF ALL RESPONSES TO PHQ QUESTIONS 1-9: 0
SUM OF ALL RESPONSES TO PHQ QUESTIONS 1-9: 0
2. FEELING DOWN, DEPRESSED OR HOPELESS: NOT AT ALL
SUM OF ALL RESPONSES TO PHQ QUESTIONS 1-9: 0
1. LITTLE INTEREST OR PLEASURE IN DOING THINGS: NOT AT ALL

## 2025-02-14 ENCOUNTER — HOSPITAL ENCOUNTER (OUTPATIENT)
Dept: OCCUPATIONAL THERAPY | Age: 70
Setting detail: THERAPIES SERIES
Discharge: HOME | End: 2025-02-14
Payer: MEDICARE

## 2025-02-14 PROCEDURE — 97110 THERAPEUTIC EXERCISES: CPT

## 2025-02-14 NOTE — PROGRESS NOTES
are required to address ROM and strength of left UE to allow patient to return to his normal tasks without difficutly.     Patient Goal: Be able to get to the point where I can lift, shovel, and work in the garden.     Short Term Goals:  Time Frame: 4 weeks  Be independent with HEP as instructed to increase his ability to eventually use his left arm to assist with driving tasks. GOAL MET - CONTINUE GOAL WITH HEP UPGRADES  Report pain going no higher than 2/10 in left shoulder at any time to assist with sleep routine. GOAL NOT MET - RELATES THAT PAIN GOES UP TO 5/10 WITH INCREASED REPS OF HEP - CONTINUE GOAL    Increase passive left shoulder flexion to 170, abduction to 170, ER at 90 to 75, and IR to 80 to increase flexibility for eventual active use of left UE for normal daily activities. GOAL MET FOR FLEXION, ABDUCTION, AND ER AT 90; GOAL NOT MET FOR IR - SEE ABOVE FOR DETAILS - REVISED GOAL: Increase passive left shoulder IR to 85 and ER to 85 to increase his flexibility to complete his normal household and yard activities.   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 GOAL MET FOR ABDUCTION, ER, AND IR; GOAL NOT MET - SEE ABOVE FOR DETAILS - REVISED GOAL: Increase active left shoulder flexion to 160, abduction to 145, ER at 90 to 85, and get left thumb 6\" above waistband behind back to increase his ability to do normal daily tasks.     Long Term Goals:  Time Frame: 7 weeks  Be able to use left arm to turn steering wheel without difficulty or pain. GOAL MET - REVISED GOAL: Be able to sleep through the night without waking due to left shoulder pain.   Be able to use left arm to retrieve item from drive thru without pain or difficulty. GOAL NOT MET - CONTINUE GOAL  Be able to assist his elderly father with needed activities without any increase in pain in left shoulder.

## 2025-02-17 ENCOUNTER — HOSPITAL ENCOUNTER (OUTPATIENT)
Dept: OCCUPATIONAL THERAPY | Age: 70
Setting detail: THERAPIES SERIES
Discharge: HOME | End: 2025-02-17
Payer: MEDICARE

## 2025-02-17 PROCEDURE — 97110 THERAPEUTIC EXERCISES: CPT

## 2025-02-17 NOTE — PROGRESS NOTES
[facility]  OCCUPATIONAL THERAPY  [] EVALUATION  [x] DAILY NOTE (LAND) [] DAILY NOTE (AQUATIC ) [] PROGRESS NOTE [] DISCHARGE NOTE    [] OUTPATIENT REHABILITATION CENTER [facility]   [] [place] [facility]    [x] [facility]   [] [facility]    Date: 2025  Patient Name:  Freddy Daniel  : 1955  MRN: [id number]  CSN: [id number]    Referring Practitioner Low Washington MD [id number]      Diagnosis  Diagnoses       M75.102 (ICD-10-CM) - Unspecified rotator cuff tear or rupture of left shoulder, not specified as traumatic           Treatment Diagnosis M25.512  Left Shoulder Pain  M79.622  Pain in Left Upper Arm  M25.612 Stiffness of Left Shoulder   Date of Evaluation 24   Additional Pertinent History Freddy Daniel has a past medical history of Atrial flutter (HCC), Calcium oxalate renal stones, Coronary artery disease involving native coronary artery of native heart without angina pectoris, Essential hypertension, GERD (gastroesophageal reflux disease), Hyperlipidemia, Osteoarthritis, Palpitation, and Ulcerative colitis (HCC).  he has a past surgical history that includes Colonoscopy; Cardiac catheterization; Rotator cuff repair; Knee arthroscopy; Finger trigger release; Tonsillectomy; Insertable Cardiac Monitor (2021); Percutaneous Transluminal Coronary Angio (2020); and Pacemaker insertion (2022).     Allergies No Known Allergies   Medications   Current Outpatient Medications:     guaiFENesin (MUCINEX PO), Take by mouth, Disp: , Rfl:     losartan (COZAAR) 50 MG tablet, Take 1 tablet by mouth once daily, Disp: 90 tablet, Rfl: 3    atorvastatin (LIPITOR) 40 MG tablet, Take 1 tablet by mouth daily, Disp: 90 tablet, Rfl: 3    levocetirizine (XYZAL ALLERGY 24HR) 5 MG tablet, Take 1 tablet by mouth nightly, Disp: 90 tablet, Rfl: 3    metoprolol tartrate (LOPRESSOR) 25 MG tablet, Take 0.5 tablets by mouth 2 times daily, Disp: 90 tablet,

## 2025-02-21 ENCOUNTER — HOSPITAL ENCOUNTER (OUTPATIENT)
Dept: OCCUPATIONAL THERAPY | Age: 70
Setting detail: THERAPIES SERIES
Discharge: HOME | End: 2025-02-21
Payer: MEDICARE

## 2025-02-21 PROCEDURE — 97110 THERAPEUTIC EXERCISES: CPT

## 2025-02-21 NOTE — PROGRESS NOTES
[facility]  OCCUPATIONAL THERAPY  [] EVALUATION  [x] DAILY NOTE (LAND) [] DAILY NOTE (AQUATIC ) [] PROGRESS NOTE [] DISCHARGE NOTE    [] OUTPATIENT REHABILITATION CENTER [facility]   [] [employer] [facility]    [x] [facility]   [] [facility]    Date: 2025  Patient Name:  Freddy Daniel  : 1955  MRN: [id number]  CSN: [id number]    Referring Practitioner Low Washington MD [id number]      Diagnosis  Diagnoses       M75.102 (ICD-10-CM) - Unspecified rotator cuff tear or rupture of left shoulder, not specified as traumatic           Treatment Diagnosis M25.512  Left Shoulder Pain  M79.622  Pain in Left Upper Arm  M25.612 Stiffness of Left Shoulder   Date of Evaluation 24   Additional Pertinent History Freddy Daniel has a past medical history of Atrial flutter (HCC), Calcium oxalate renal stones, Coronary artery disease involving native coronary artery of native heart without angina pectoris, Essential hypertension, GERD (gastroesophageal reflux disease), Hyperlipidemia, Osteoarthritis, Palpitation, and Ulcerative colitis (HCC).  he has a past surgical history that includes Colonoscopy; Cardiac catheterization; Rotator cuff repair; Knee arthroscopy; Finger trigger release; Tonsillectomy; Insertable Cardiac Monitor (2021); Percutaneous Transluminal Coronary Angio (2020); and Pacemaker insertion (2022).     Allergies No Known Allergies   Medications   Current Outpatient Medications:     guaiFENesin (MUCINEX PO), Take by mouth, Disp: , Rfl:     losartan (COZAAR) 50 MG tablet, Take 1 tablet by mouth once daily, Disp: 90 tablet, Rfl: 3    atorvastatin (LIPITOR) 40 MG tablet, Take 1 tablet by mouth daily, Disp: 90 tablet, Rfl: 3    levocetirizine (XYZAL ALLERGY 24HR) 5 MG tablet, Take 1 tablet by mouth nightly, Disp: 90 tablet, Rfl: 3    metoprolol tartrate (LOPRESSOR) 25 MG tablet, Take 0.5 tablets by mouth 2 times daily, Disp: 90 tablet,

## 2025-02-24 ENCOUNTER — HOSPITAL ENCOUNTER (OUTPATIENT)
Dept: OCCUPATIONAL THERAPY | Age: 70
Setting detail: THERAPIES SERIES
Discharge: HOME | End: 2025-02-24
Payer: MEDICARE

## 2025-02-24 PROCEDURE — 97110 THERAPEUTIC EXERCISES: CPT

## 2025-02-24 NOTE — PROGRESS NOTES
[facility]  OCCUPATIONAL THERAPY  [] EVALUATION  [x] DAILY NOTE (LAND) [] DAILY NOTE (AQUATIC ) [] PROGRESS NOTE [] DISCHARGE NOTE    [] OUTPATIENT REHABILITATION CENTER [facility]   [] [place] [facility]    [x] [facility]   [facility]    Date: 2025  Patient Name:  Freddy Daniel  : 1955  MRN: [id number]  CSN: [id number]    Referring Practitioner Low Washington MD [id number]      Diagnosis  Diagnoses       M75.102 (ICD-10-CM) - Unspecified rotator cuff tear or rupture of left shoulder, not specified as traumatic           Treatment Diagnosis M25.512  Left Shoulder Pain  M79.622  Pain in Left Upper Arm  M25.612 Stiffness of Left Shoulder   Date of Evaluation 24   Additional Pertinent History Freddy Daniel has a past medical history of Atrial flutter (HCC), Calcium oxalate renal stones, Coronary artery disease involving native coronary artery of native heart without angina pectoris, Essential hypertension, GERD (gastroesophageal reflux disease), Hyperlipidemia, Osteoarthritis, Palpitation, and Ulcerative colitis (HCC).  he has a past surgical history that includes Colonoscopy; Cardiac catheterization; Rotator cuff repair; Knee arthroscopy; Finger trigger release; Tonsillectomy; Insertable Cardiac Monitor (2021); Percutaneous Transluminal Coronary Angio (2020); and Pacemaker insertion (2022).     Allergies No Known Allergies   Medications   Current Outpatient Medications:     guaiFENesin (MUCINEX PO), Take by mouth, Disp: , Rfl:     losartan (COZAAR) 50 MG tablet, Take 1 tablet by mouth once daily, Disp: 90 tablet, Rfl: 3    atorvastatin (LIPITOR) 40 MG tablet, Take 1 tablet by mouth daily, Disp: 90 tablet, Rfl: 3    levocetirizine (XYZAL ALLERGY 24HR) 5 MG tablet, Take 1 tablet by mouth nightly, Disp: 90 tablet, Rfl: 3    metoprolol tartrate (LOPRESSOR) 25 MG tablet, Take 0.5 tablets by mouth 2 times daily, Disp: 90 tablet,

## 2025-02-28 ENCOUNTER — HOSPITAL ENCOUNTER (OUTPATIENT)
Dept: OCCUPATIONAL THERAPY | Age: 70
Setting detail: THERAPIES SERIES
Discharge: HOME | End: 2025-02-28
Payer: MEDICARE

## 2025-02-28 PROCEDURE — 97110 THERAPEUTIC EXERCISES: CPT

## 2025-03-04 ENCOUNTER — HOSPITAL ENCOUNTER (OUTPATIENT)
Dept: OCCUPATIONAL THERAPY | Age: 70
Setting detail: THERAPIES SERIES
Discharge: HOME | End: 2025-03-04
Payer: MEDICARE

## 2025-03-04 PROCEDURE — 97110 THERAPEUTIC EXERCISES: CPT

## 2025-03-04 NOTE — PROGRESS NOTES
3    rivaroxaban (XARELTO) 20 MG TABS tablet, Take 1 tablet by mouth daily (with breakfast), Disp: 90 tablet, Rfl: 3    nitroGLYCERIN (NITROSTAT) 0.4 MG SL tablet, USE UP TO A MAX OF 3 TOTAL DOSES; IF NO RELIEF AFTER 1 DOSE CALL 911 (Patient not taking: Reported on 2/11/2025), Disp: 25 tablet, Rfl: 3    omeprazole (PRILOSEC) 20 MG delayed release capsule, Take 1 capsule by mouth daily, Disp: , Rfl:     Acetaminophen (TYLENOL ARTHRITIS PAIN PO), Take by mouth (Patient not taking: Reported on 2/11/2025), Disp: , Rfl:     NONFORMULARY, Saline spray as needed, Disp: , Rfl:     CPAP Machine MISC, by Does not apply route Change to CPAP pressure 9 cm H2O. Increase EPR to 3 DL in 2 weeks, Disp: 1 each, Rfl: 0    multivitamin (THERAGRAN) per tablet, Take 1 tablet by mouth daily, Disp: , Rfl:     aspirin 81 MG EC tablet, Take 1 tablet by mouth daily, Disp: , Rfl:   Taking percoset for pain relief after surgery      Functional Outcome Measure Used SPADI   Functional Outcome Score 94 (11/5/24)       Insurance: Primary: Payor: MEDICARE /  /  / ,   Secondary: MEDICAL MUTUAL   Authorization Information PRECERTIFICATION REQUIRED:  No  INSURANCE THERAPY BENEFIT:  Visits based on medical necessity  Benefit will not cover maintenance or preventative treatment.  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 States that he started having pain after that activity and went to see ortho. Patient states that he had

## 2025-03-07 ENCOUNTER — HOSPITAL ENCOUNTER (OUTPATIENT)
Dept: OCCUPATIONAL THERAPY | Age: 70
Setting detail: THERAPIES SERIES
Discharge: HOME | End: 2025-03-07
Payer: MEDICARE

## 2025-03-07 PROCEDURE — 97110 THERAPEUTIC EXERCISES: CPT

## 2025-03-07 NOTE — PROGRESS NOTES
[facility]  OCCUPATIONAL THERAPY  [] EVALUATION  [x] DAILY NOTE (LAND) [] DAILY NOTE (AQUATIC ) [] PROGRESS NOTE [] DISCHARGE NOTE    [] OUTPATIENT REHABILITATION CENTER [facility]   [] [place] [facility]ARE C[facility]    [x] [facility]   [] [facility]    Date: 3/7/2025  Patient Name:  Freddy Daniel  : 1955  MRN: [id number]  CSN: [id number]    Referring Practitioner Low Washington MD [id number]      Diagnosis  Diagnoses       M75.102 (ICD-10-CM) - Unspecified rotator cuff tear or rupture of left shoulder, not specified as traumatic           Treatment Diagnosis M25.512  Left Shoulder Pain  M79.622  Pain in Left Upper Arm  M25.612 Stiffness of Left Shoulder   Date of Evaluation 24   Additional Pertinent History Freddy Daniel has a past medical history of Atrial flutter (HCC), Calcium oxalate renal stones, Coronary artery disease involving native coronary artery of native heart without angina pectoris, Essential hypertension, GERD (gastroesophageal reflux disease), Hyperlipidemia, Osteoarthritis, Palpitation, and Ulcerative colitis (HCC).  he has a past surgical history that includes Colonoscopy; Cardiac catheterization; Rotator cuff repair; Knee arthroscopy; Finger trigger release; Tonsillectomy; Insertable Cardiac Monitor (2021); Percutaneous Transluminal Coronary Angio (2020); and Pacemaker insertion (2022).     Allergies No Known Allergies   Medications   Current Outpatient Medications:     guaiFENesin (MUCINEX PO), Take by mouth, Disp: , Rfl:     losartan (COZAAR) 50 MG tablet, Take 1 tablet by mouth once daily, Disp: 90 tablet, Rfl: 3    atorvastatin (LIPITOR) 40 MG tablet, Take 1 tablet by mouth daily, Disp: 90 tablet, Rfl: 3    levocetirizine (XYZAL ALLERGY 24HR) 5 MG tablet, Take 1 tablet by mouth nightly, Disp: 90 tablet, Rfl: 3    metoprolol tartrate (LOPRESSOR) 25 MG tablet, Take 0.5 tablets by mouth 2 times daily, Disp: 90 tablet, Rfl:

## 2025-03-10 ENCOUNTER — HOSPITAL ENCOUNTER (OUTPATIENT)
Dept: OCCUPATIONAL THERAPY | Age: 70
Setting detail: THERAPIES SERIES
Discharge: HOME | End: 2025-03-10
Payer: MEDICARE

## 2025-03-10 PROCEDURE — 97110 THERAPEUTIC EXERCISES: CPT

## 2025-03-10 NOTE — PROGRESS NOTES
Rfl: 3    rivaroxaban (XARELTO) 20 MG TABS tablet, Take 1 tablet by mouth daily (with breakfast), Disp: 90 tablet, Rfl: 3    nitroGLYCERIN (NITROSTAT) 0.4 MG SL tablet, USE UP TO A MAX OF 3 TOTAL DOSES; IF NO RELIEF AFTER 1 DOSE CALL 911 (Patient not taking: Reported on 2/11/2025), Disp: 25 tablet, Rfl: 3    omeprazole (PRILOSEC) 20 MG delayed release capsule, Take 1 capsule by mouth daily, Disp: , Rfl:     Acetaminophen (TYLENOL ARTHRITIS PAIN PO), Take by mouth (Patient not taking: Reported on 2/11/2025), Disp: , Rfl:     NONFORMULARY, Saline spray as needed, Disp: , Rfl:     CPAP Machine MISC, by Does not apply route Change to CPAP pressure 9 cm H2O. Increase EPR to 3 DL in 2 weeks, Disp: 1 each, Rfl: 0    multivitamin (THERAGRAN) per tablet, Take 1 tablet by mouth daily, Disp: , Rfl:     aspirin 81 MG EC tablet, Take 1 tablet by mouth daily, Disp: , Rfl:   Taking percoset for pain relief after surgery      Functional Outcome Measure Used SPADI   Functional Outcome Score 94 (11/5/24)       Insurance: Primary: Payor: MEDICARE /  /  / ,   Secondary: MEDICAL MUTUAL   Authorization Information PRECERTIFICATION REQUIRED:  No  INSURANCE THERAPY BENEFIT:  Visits based on medical necessity  Benefit will not cover maintenance or preventative treatment.  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 States that he started having pain after that activity and went to see ortho. Patient states that he

## 2025-03-14 ENCOUNTER — HOSPITAL ENCOUNTER (OUTPATIENT)
Dept: OCCUPATIONAL THERAPY | Age: 70
Setting detail: THERAPIES SERIES
Discharge: HOME | End: 2025-03-14
Payer: MEDICARE

## 2025-03-14 PROCEDURE — 97110 THERAPEUTIC EXERCISES: CPT

## 2025-03-14 NOTE — PROGRESS NOTES
theraband  1 10     Prone Hughstons -  scaption and horiz abd/add with thumb up and palm down 1 5 each     Supine PREs with 2# in left UE - shoulder flexion, ceiling punches, ceiling circles, punch plus, horizontal abduction/adduction 2 10 each  2nd set with 3#    Body blade in left UE - down at side, 90 abduction, 90 flexion  1 minute each x Fatigue reported but was able to complete   Reaching activity with left UE - placing and removing clothespins from vertical post 1 2 cycles x    Ball on wall - up/down, side to side, circles  1 10     Standing side raises, front raise and scaption to 90 degrees for deltoid strengthening using 1# in left UE 1 15 each     Biodex at 50 speed x 3 minutes forward and 3 minutes backward   x    Sleeper stretch for left shoulder IR - instructed in both sidelying and standing       Supine PROM for left shoulder in all planes to patient tolerance    Focus on IR of left shoulder during PROM   Activities Time    Notes/Comments   Rock tape applied to left UE - I strip on bicep and Y strip around deltoid   Completed for pain management   Information gathered for PN              Specific Interventions Next Treatment: advance per [facility] protocol    Activity/Treatment Tolerance:  [x]  Patient tolerated treatment well  []  Patient limited by fatigue  []  Patient limited by pain   []  Patient limited by other medical complications  []  Other:     Assessment:    Patient is progressing toward goals nicely. Patient is tolerating increased resistance  and reps with little difficulty.       Patient Goal: Be able to get to the point where I can lift, shovel, and work in the garden.     Short Term Goals:  Time Frame: 4 weeks  Be independent with HEP as instructed to increase his ability to eventually use his left arm to assist with driving tasks.   Report pain going no higher than 2/10 in left shoulder at any time to assist with sleep routine.      Increase passive left shoulder IR to 85 and ER to 85 to

## 2025-03-20 NOTE — PATIENT INSTRUCTIONS
.How was your appointment at the device clinic today?  Did one of pacemaker nurses made your day?   Is there something we can do to make your appointment better?    Please let us know about it on the survey you receive in the mail or let us know on a [employer] message!    Heck, we even love Post it notes!  We appreciate you and want to make your appointment with us the best we can!       Thank you!  Bobbi Christine and Morgan

## 2025-03-21 ENCOUNTER — HOSPITAL ENCOUNTER (OUTPATIENT)
Dept: OCCUPATIONAL THERAPY | Age: 70
Setting detail: THERAPIES SERIES
Discharge: HOME | End: 2025-03-21
Payer: MEDICARE

## 2025-03-21 PROCEDURE — 97110 THERAPEUTIC EXERCISES: CPT

## 2025-03-21 NOTE — PROGRESS NOTES
standing riivalid  2/10/25: peach theraband - anchored left shoulder horizontal abduction, upward and downward angle; to increase to 2# for supine PREs and to add horizontal abduction  2/14/25: goal status  2/17/25: front, side and scaption raises with 1#  2/21/25: to move to green theraband from orange and to move to orange theraband from peach; reviewed entire HEP and instructed patient on what exercises to continue with  2/28/25: prone or modified hughstons with left UE  [x]  No new Education completed;   [x]  Reviewed Prior HEP      [x]  Patient verbalized and/or demonstrated understanding of education provided.  []  Patient unable to verbalize and/or demonstrate understanding of education provided.  Will continue education.  [x]  Barriers to learning: none    PLAN:      []  Plan of care initiated.  Plan to see patient 2 times per week for 16 weeks to address the treatment planned outlined above.  [x]  Continue with current plan of care  []  Modify plan of care as follows:  See patient 2 x week x 3 weeks and then 1 x week x 4 weeks from 2/14/25  []  Hold pending physician visit  []  Discharge    Time In 1300   Time Out 1345   Timed Code Minutes: 45 min   Total Treatment Time: 45 min     Yesica Salazar, OTR/L #[id number]

## 2025-03-24 ENCOUNTER — CLINICAL SUPPORT (OUTPATIENT)
Dept: CARDIOLOGY CLINIC | Age: 70
End: 2025-03-24

## 2025-03-24 DIAGNOSIS — Z95.0 PACEMAKER: Primary | ICD-10-CM

## 2025-03-28 ENCOUNTER — HOSPITAL ENCOUNTER (OUTPATIENT)
Dept: OCCUPATIONAL THERAPY | Age: 70
Setting detail: THERAPIES SERIES
Discharge: HOME | End: 2025-03-28
Payer: MEDICARE

## 2025-03-28 PROCEDURE — 97110 THERAPEUTIC EXERCISES: CPT

## 2025-03-28 NOTE — PROGRESS NOTES
3/28/25 GOAL MET Pt reporting he has been doing some light work in his shop but was not sure on what or how much he could lift.     Patient Education:   []  HEP/Education Completed: Plan of Care, Goals, HEP - scapular retraction, scapular shrugs, table slides to 90 flexion, ER dowel in supine to 20; use of ice  11/8/24: pulleys for shoulder flexion to 90, seated ER with dowel; to do ER stretch as the last stretch with HEP  12/6/24: goal status  12/13/24: supine dowel for shoulder flexion and chest press, standing dowel for shoulder extension and IR  12/19/24: table slides for shoulder abduction  12/23/24: isometrics  1/3/25: supine AROM - shoulder flexion, ceiling punches, ceiling circles  1/6/25: supine posterior capsule stretch  1/10/25: goal status  1/13/25: orange theraband - sheridan with left UE; to add 1# to supine AROM exercises   1/17/25: to start using peach theraband; to not use 1# over the weekend on supine AROM  1/20/25: rows with theraband  1/24/25: IR stretch behind back with towel  1/27/25: to use orange theraband as able for HEP  1/31/25: orange theraband- bilateral ER in standing, rows with elbows extended and flexed; peach theraband - standing riivalid  2/10/25: peach theraband - anchored left shoulder horizontal abduction, upward and downward angle; to increase to 2# for supine PREs and to add horizontal abduction  2/14/25: goal status  2/17/25: front, side and scaption raises with 1#  2/21/25: to move to green theraband from orange and to move to orange theraband from peach; reviewed entire HEP and instructed patient on what exercises to continue with  2/28/25: prone or modified hughstons with left UE  [x]  No new Education completed;   [x]  Reviewed Prior HEP      [x]  Patient verbalized and/or demonstrated understanding of education provided.  []  Patient unable to verbalize and/or demonstrate understanding of education provided.  Will continue education.  [x]  Barriers to learning:

## 2025-04-02 NOTE — PROGRESS NOTES
[facility]      Freddy Daniel         [id number]  4/3/2025   Chief Complaint   Patient presents with    Follow-up     MELVIN 1 year follow up        Pt of Dr. Mosqueda    Assessment/Plan   1. MELVIN on CPAP  -Yearly supply order placed? Yes   -Download reviewed and discussed with patient.  -The symptoms of MELVIN have improved. The patient is benefiting from therapy and should continue use of PAP device. I have reviewed the download.   -Patient's symptoms and AHI are well controlled with current settings of 9 cmH2O. Continue current pressure settings.  -Advised to continue current positive airway pressure therapy with above described pressure.   -Advised to keep good compliance with current recommended pressure to get optimal results and clinical improvement  -Recommend 7-9 hours of sleep with PAP  -Instructed to call [employer] company regarding supplies if needed.   -Patient to call my office for earlier appointment if needed for worsening of sleep symptoms.   -Patient is not to drive if feeling sleepy    2. Obesity (BMI 30-39.9) - gained 8 lbs   -Counseled patient on weight loss    Educated about my impression and plan. Patient verbalizes understanding.    Return in about 1 year (around 4/3/2026) for MELVIN. with download.       Subjective     PAP Download:   Original or initial AHI: 36.4     Date of initial study: 1/26/21    Set up on current machine: 2021    Compliant  97%     Noncompliant 0 %     PAP Type CPAP Level  9 cmH2O  Avg Hrs/Day 6 hours and 7 minutes  AHI: 4.3   Recorded compliance dates: 3/3/25 to 4/1/25  Machine/Mfg:   [x] ResMed    [] Respironics/Dreamstation   Interface:   [] Nasal    [] Nasal pillows   [x] FFM      Provider:      [x] [facility]     [][facility]     [] Jon    [] Bien[facility]    [] Lamont               [] [facility]      [] [facility]    [] [place]:      [] Other    Neck Size: 18  Mallampati 2  ESS:  8  SAQLI: 92    Here is a scan of the most recent

## 2025-04-03 ENCOUNTER — OFFICE VISIT (OUTPATIENT)
Age: 70
End: 2025-04-03
Payer: MEDICARE

## 2025-04-03 VITALS
HEART RATE: 55 BPM | SYSTOLIC BLOOD PRESSURE: 112 MMHG | WEIGHT: 196.4 LBS | OXYGEN SATURATION: 96 % | DIASTOLIC BLOOD PRESSURE: 68 MMHG | TEMPERATURE: 98.8 F | BODY MASS INDEX: 32.72 KG/M2 | HEIGHT: 65 IN

## 2025-04-03 DIAGNOSIS — E66.9 OBESITY (BMI 30-39.9): ICD-10-CM

## 2025-04-03 DIAGNOSIS — G47.33 OSA ON CPAP: Primary | ICD-10-CM

## 2025-04-03 PROCEDURE — 1160F RVW MEDS BY RX/DR IN RCRD: CPT

## 2025-04-03 PROCEDURE — 1036F TOBACCO NON-USER: CPT

## 2025-04-03 PROCEDURE — G8427 DOCREV CUR MEDS BY ELIG CLIN: HCPCS

## 2025-04-03 PROCEDURE — 1123F ACP DISCUSS/DSCN MKR DOCD: CPT

## 2025-04-03 PROCEDURE — 3078F DIAST BP <80 MM HG: CPT

## 2025-04-03 PROCEDURE — 3017F COLORECTAL CA SCREEN DOC REV: CPT

## 2025-04-03 PROCEDURE — 1159F MED LIST DOCD IN RCRD: CPT

## 2025-04-03 PROCEDURE — 99214 OFFICE O/P EST MOD 30 MIN: CPT

## 2025-04-03 PROCEDURE — 3074F SYST BP LT 130 MM HG: CPT

## 2025-04-03 PROCEDURE — G8417 CALC BMI ABV UP PARAM F/U: HCPCS

## 2025-04-03 ASSESSMENT — ENCOUNTER SYMPTOMS
RHINORRHEA: 1
RESPIRATORY NEGATIVE: 1

## 2025-05-05 ENCOUNTER — TELEPHONE (OUTPATIENT)
Dept: CARDIOLOGY CLINIC | Age: 70
End: 2025-05-05

## 2025-05-05 NOTE — TELEPHONE ENCOUNTER
Pt last seen by Dr. Kumar 9-4-24.  Pt is needing clearance for Colonoscopy and EGD with [facility] TBD.    Pt is on ASA and Xarelto    Fax [phone]

## 2025-08-13 ENCOUNTER — OFFICE VISIT (OUTPATIENT)
Dept: FAMILY MEDICINE CLINIC | Age: 70
End: 2025-08-13

## 2025-08-13 VITALS
SYSTOLIC BLOOD PRESSURE: 110 MMHG | HEIGHT: 65 IN | TEMPERATURE: 97.9 F | OXYGEN SATURATION: 98 % | WEIGHT: 190.7 LBS | DIASTOLIC BLOOD PRESSURE: 74 MMHG | HEART RATE: 62 BPM | BODY MASS INDEX: 31.77 KG/M2 | RESPIRATION RATE: 18 BRPM

## 2025-08-13 DIAGNOSIS — N52.9 ERECTILE DYSFUNCTION, UNSPECIFIED ERECTILE DYSFUNCTION TYPE: ICD-10-CM

## 2025-08-13 DIAGNOSIS — I44.30 AV BLOCK: ICD-10-CM

## 2025-08-13 DIAGNOSIS — I25.10 CORONARY ARTERY DISEASE INVOLVING NATIVE CORONARY ARTERY OF NATIVE HEART WITHOUT ANGINA PECTORIS: ICD-10-CM

## 2025-08-13 DIAGNOSIS — J30.1 CHRONIC SEASONAL ALLERGIC RHINITIS DUE TO POLLEN: ICD-10-CM

## 2025-08-13 DIAGNOSIS — M15.0 PRIMARY OSTEOARTHRITIS INVOLVING MULTIPLE JOINTS: ICD-10-CM

## 2025-08-13 DIAGNOSIS — E78.00 PURE HYPERCHOLESTEROLEMIA: ICD-10-CM

## 2025-08-13 DIAGNOSIS — G44.019 EPISODIC CLUSTER HEADACHE, NOT INTRACTABLE: ICD-10-CM

## 2025-08-13 DIAGNOSIS — I45.10 RBBB: ICD-10-CM

## 2025-08-13 DIAGNOSIS — I48.20 CHRONIC ATRIAL FIBRILLATION, UNSPECIFIED (HCC): ICD-10-CM

## 2025-08-13 DIAGNOSIS — B35.1 ONYCHOMYCOSIS: ICD-10-CM

## 2025-08-13 DIAGNOSIS — I10 ESSENTIAL HYPERTENSION: ICD-10-CM

## 2025-08-13 DIAGNOSIS — K51.80 OTHER ULCERATIVE COLITIS WITHOUT COMPLICATION (HCC): ICD-10-CM

## 2025-08-13 DIAGNOSIS — E87.0 HYPERNATREMIA: ICD-10-CM

## 2025-08-13 DIAGNOSIS — Z00.00 MEDICARE ANNUAL WELLNESS VISIT, SUBSEQUENT: Primary | ICD-10-CM

## 2025-08-13 DIAGNOSIS — I20.9 ANGINA, CLASS III: ICD-10-CM

## 2025-08-13 DIAGNOSIS — K21.9 GASTROESOPHAGEAL REFLUX DISEASE WITHOUT ESOPHAGITIS: ICD-10-CM

## 2025-08-13 DIAGNOSIS — I20.89 ANGINAL EQUIVALENT: ICD-10-CM

## 2025-08-13 DIAGNOSIS — R35.0 URINARY FREQUENCY: ICD-10-CM

## 2025-08-13 ASSESSMENT — PATIENT HEALTH QUESTIONNAIRE - PHQ9
2. FEELING DOWN, DEPRESSED OR HOPELESS: NOT AT ALL
SUM OF ALL RESPONSES TO PHQ QUESTIONS 1-9: 0
SUM OF ALL RESPONSES TO PHQ QUESTIONS 1-9: 0
1. LITTLE INTEREST OR PLEASURE IN DOING THINGS: NOT AT ALL
SUM OF ALL RESPONSES TO PHQ QUESTIONS 1-9: 0
SUM OF ALL RESPONSES TO PHQ QUESTIONS 1-9: 0

## 2025-08-13 ASSESSMENT — LIFESTYLE VARIABLES
HOW MANY STANDARD DRINKS CONTAINING ALCOHOL DO YOU HAVE ON A TYPICAL DAY: 3 OR 4
HOW OFTEN DO YOU HAVE A DRINK CONTAINING ALCOHOL: 2-4 TIMES A MONTH